# Patient Record
Sex: MALE | Race: WHITE | NOT HISPANIC OR LATINO | Employment: OTHER | URBAN - METROPOLITAN AREA
[De-identification: names, ages, dates, MRNs, and addresses within clinical notes are randomized per-mention and may not be internally consistent; named-entity substitution may affect disease eponyms.]

---

## 2017-09-29 ENCOUNTER — ALLSCRIPTS OFFICE VISIT (OUTPATIENT)
Dept: OTHER | Facility: OTHER | Age: 72
End: 2017-09-29

## 2018-01-12 VITALS
DIASTOLIC BLOOD PRESSURE: 72 MMHG | SYSTOLIC BLOOD PRESSURE: 112 MMHG | HEIGHT: 70 IN | WEIGHT: 149 LBS | BODY MASS INDEX: 21.33 KG/M2 | HEART RATE: 66 BPM

## 2018-02-12 ENCOUNTER — TRANSCRIBE ORDERS (OUTPATIENT)
Dept: ADMINISTRATIVE | Facility: HOSPITAL | Age: 73
End: 2018-02-12

## 2018-02-12 DIAGNOSIS — R10.9 STOMACH PAIN: Primary | ICD-10-CM

## 2018-02-15 ENCOUNTER — HOSPITAL ENCOUNTER (OUTPATIENT)
Dept: ULTRASOUND IMAGING | Facility: HOSPITAL | Age: 73
Discharge: HOME | End: 2018-02-15
Attending: INTERNAL MEDICINE
Payer: MEDICARE

## 2018-02-15 DIAGNOSIS — R10.9 STOMACH PAIN: ICD-10-CM

## 2018-02-15 PROCEDURE — 76700 US EXAM ABDOM COMPLETE: CPT

## 2018-03-15 ENCOUNTER — TRANSCRIBE ORDERS (OUTPATIENT)
Dept: ADMINISTRATIVE | Facility: HOSPITAL | Age: 73
End: 2018-03-15

## 2018-03-15 DIAGNOSIS — R10.13 ABDOMINAL PAIN, EPIGASTRIC: Primary | ICD-10-CM

## 2018-03-26 ENCOUNTER — HOSPITAL ENCOUNTER (OUTPATIENT)
Dept: NON INVASIVE DIAGNOSTICS | Facility: CLINIC | Age: 73
Discharge: HOME | End: 2018-03-26
Payer: MEDICARE

## 2018-03-26 DIAGNOSIS — R10.13 ABDOMINAL PAIN, EPIGASTRIC: ICD-10-CM

## 2018-03-26 PROCEDURE — 78227 HEPATOBIL SYST IMAGE W/DRUG: CPT

## 2018-03-26 PROCEDURE — A9537 TC99M MEBROFENIN: HCPCS

## 2018-03-26 RX ADMIN — SINCALIDE 1.3 MCG: 5 INJECTION, POWDER, LYOPHILIZED, FOR SOLUTION INTRAVENOUS at 10:07

## 2018-04-22 ENCOUNTER — HOSPITAL ENCOUNTER (OUTPATIENT)
Dept: CT IMAGING | Facility: HOSPITAL | Age: 73
Discharge: HOME | End: 2018-04-22
Attending: INTERNAL MEDICINE
Payer: MEDICARE

## 2018-04-22 DIAGNOSIS — R10.13 ABDOMINAL PAIN, EPIGASTRIC: ICD-10-CM

## 2018-04-22 PROCEDURE — 74177 CT ABD & PELVIS W/CONTRAST: CPT

## 2018-04-22 RX ADMIN — IOHEXOL 100 ML: 350 INJECTION, SOLUTION INTRAVENOUS at 11:15

## 2018-04-27 ENCOUNTER — TRANSCRIBE ORDERS (OUTPATIENT)
Dept: ADMINISTRATIVE | Facility: HOSPITAL | Age: 73
End: 2018-04-27

## 2018-04-27 DIAGNOSIS — R93.429 ABNORMAL CT SCAN, KIDNEY: Primary | ICD-10-CM

## 2018-05-13 ENCOUNTER — HOSPITAL ENCOUNTER (OUTPATIENT)
Dept: MRI IMAGING | Facility: HOSPITAL | Age: 73
Discharge: HOME | End: 2018-05-13
Attending: INTERNAL MEDICINE
Payer: MEDICARE

## 2018-05-13 DIAGNOSIS — R93.429 ABNORMAL CT SCAN, KIDNEY: ICD-10-CM

## 2018-05-13 PROCEDURE — 74183 MRI ABD W/O CNTR FLWD CNTR: CPT

## 2018-05-13 PROCEDURE — A9585 GADOBUTROL INJECTION: HCPCS | Performed by: INTERNAL MEDICINE

## 2018-05-13 RX ADMIN — GADOBUTROL 6 ML: 604.72 INJECTION INTRAVENOUS at 11:02

## 2018-05-15 ENCOUNTER — TELEPHONE (OUTPATIENT)
Dept: UROLOGY | Facility: AMBULATORY SURGERY CENTER | Age: 73
End: 2018-05-15

## 2018-05-15 NOTE — TELEPHONE ENCOUNTER
Received phone call from patient stating that he had gotten MRI results and needs OV ASAP    OV scheduled at patient's convenience

## 2018-05-18 ENCOUNTER — OFFICE VISIT (OUTPATIENT)
Dept: UROLOGY | Facility: CLINIC | Age: 73
End: 2018-05-18
Payer: MEDICARE

## 2018-05-18 VITALS
HEIGHT: 69 IN | WEIGHT: 142.8 LBS | BODY MASS INDEX: 21.15 KG/M2 | DIASTOLIC BLOOD PRESSURE: 72 MMHG | HEART RATE: 78 BPM | SYSTOLIC BLOOD PRESSURE: 118 MMHG

## 2018-05-18 DIAGNOSIS — N28.89 RENAL MASS, LEFT: Primary | ICD-10-CM

## 2018-05-18 PROCEDURE — 99214 OFFICE O/P EST MOD 30 MIN: CPT | Performed by: UROLOGY

## 2018-05-18 RX ORDER — LEVOTHYROXINE SODIUM 0.07 MG/1
TABLET ORAL
COMMUNITY
Start: 2014-02-25 | End: 2020-12-18 | Stop reason: SDUPTHER

## 2018-05-18 NOTE — PROGRESS NOTES
5/18/2018    Neel Ramírez    [birth year]  [id number]        Assessment  Left lower pole renal mass suspicious for renal cell carcinoma      Discussion  I had a lengthy discussion with the patient and his wife in the office today concerning CT scan as well as MRI findings regarding his incidentally found 1 5 cm left lower pole renal mass  Based on CT and MR criteria, this mass does appear to be most suspicious for a small renal cell carcinoma  We discussed options including observation as the mass is less than 2 cm versus percutaneous cryotherapy with a percutaneous biopsy versus robot assisted laparoscopic left partial nephrectomy  I feel that based on the small size of the mass that observation is appropriate at this time  The patient and his wife are comfortable with this plan  Follow-up will be in 6 months with a basic metabolic panel along with a repeat CT scan of the abdomen and pelvis with and without IV contrast   If there is interval growth of significance of the mass will then discuss cryotherapy versus surgical resection  History of Present Illness  75 y o  male with a history of intermittent abdominal pain  This prompted a CT scan  The CT scan showed a 1 5 cm lower pole solid enhancing mass  This was an incidental finding  Patient then had an MRI for confirmation which also is most consistent with a renal cell carcinoma  Patient presents to the office today to discuss and review the results of the CT as well as the MRI  AUA Symptom Score      Review of Systems  Review of Systems   Constitutional: Negative  HENT: Negative  Eyes: Negative  Respiratory: Negative  Cardiovascular: Negative  Gastrointestinal: Negative  Endocrine: Negative  Genitourinary: Negative  Musculoskeletal: Negative  Skin: Negative  Allergic/Immunologic: Negative  Neurological: Negative  Hematological: Negative  Psychiatric/Behavioral: Negative            Past Medical History  Past Medical History:   Diagnosis Date    Erectile dysfunction of non-organic origin     Gastroesophageal reflux     Hyperlipidemia     Thyroid disease        Past Social History  Past Surgical History:   Procedure Laterality Date    CYSTOSCOPY         Past Family History  Family History   Problem Relation Age of Onset    Cancer Mother      bladder    Prostate cancer Father     Stroke Father        Past Social history  Social History     Social History    Marital status: /Civil Union     Spouse name: N/A    Number of children: N/A    Years of education: N/A     Occupational History    Not on file  Social History Main Topics    Smoking status: Former Smoker    Smokeless tobacco: Never Used      Comment: quit at age 23    Alcohol use Yes      Comment: rarely    Drug use: No    Sexual activity: Not on file     Other Topics Concern    Not on file     Social History Narrative    No narrative on file       Current Medications  Current Outpatient Prescriptions   Medication Sig Dispense Refill    levothyroxine 75 mcg tablet Take by mouth       No current facility-administered medications for this visit  Allergies  Allergies   Allergen Reactions    Asa-Apap-Salicyl-Caff        Past Medical History, Social History, Family History, medications and allergies were reviewed  Vitals  Vitals:    05/18/18 1025   BP: 118/72   BP Location: Left arm   Patient Position: Sitting   Cuff Size: Adult   Pulse: 78   Weight: 64 8 kg (142 lb 12 8 oz)   Height: 5' 9" (1 753 m)       Physical Exam  Physical Exam        Results  Lab Results   Component Value Date    PSA 0 5 09/23/2016     No results found for: GLUCOSE, CALCIUM, NA, K, CO2, CL, BUN, CREATININE  No results found for: WBC, HGB, HCT, MCV, PLT      Office Urine Dip  No results found for this or any previous visit (from the past 1 hour(s))  ]      Total visit time was 25 minutes of which over 50% was spent on counseling

## 2018-11-19 ENCOUNTER — HOSPITAL ENCOUNTER (OUTPATIENT)
Dept: CT IMAGING | Facility: HOSPITAL | Age: 73
Discharge: HOME | End: 2018-11-19
Attending: UROLOGY
Payer: MEDICARE

## 2018-11-19 DIAGNOSIS — N28.89 RENAL MASS, LEFT: ICD-10-CM

## 2018-11-19 PROCEDURE — 74178 CT ABD&PLV WO CNTR FLWD CNTR: CPT

## 2018-11-19 RX ADMIN — IOHEXOL 100 ML: 350 INJECTION, SOLUTION INTRAVENOUS at 10:35

## 2018-11-29 ENCOUNTER — OFFICE VISIT (OUTPATIENT)
Dept: UROLOGY | Facility: AMBULATORY SURGERY CENTER | Age: 73
End: 2018-11-29
Payer: MEDICARE

## 2018-11-29 VITALS
DIASTOLIC BLOOD PRESSURE: 60 MMHG | HEIGHT: 69 IN | WEIGHT: 140 LBS | SYSTOLIC BLOOD PRESSURE: 118 MMHG | BODY MASS INDEX: 20.73 KG/M2

## 2018-11-29 DIAGNOSIS — C64.2 MALIGNANT NEOPLASM OF LEFT KIDNEY (HCC): Primary | ICD-10-CM

## 2018-11-29 PROCEDURE — 99214 OFFICE O/P EST MOD 30 MIN: CPT | Performed by: UROLOGY

## 2018-11-29 RX ORDER — KETOCONAZOLE 20 MG/G
CREAM TOPICAL
Refills: 6 | COMMUNITY
Start: 2018-10-19 | End: 2020-01-15 | Stop reason: ALTCHOICE

## 2018-11-29 NOTE — PROGRESS NOTES
11/29/2018    Johanna Dasilva    [birth year]  [id number]        Assessment  Stable 1 6 cm left lower pole renal mass highly suspicious for renal cell carcinoma      Discussion  Provided the patient with reassurance that his small renal mass remains stable from 6 months ago  There has been no interval growth or change in size  We discussed all options including continued observation versus percutaneous cryoablation versus robot assisted laparoscopic partial left nephrectomy  The patient is comfortable continuing observation at this time and I feel that this is reasonable as the mass is stable and below 2 cm  We did discuss the risk of disease progression or even metastasis from and untreated renal cell carcinoma  I recommend obtaining a chest x-ray at this time and as well as an additional chest x-ray renal ultrasound in 6 months time  History of Present Illness  75 y o  male with a history of an incidentally found left lower pole renal mass suspicious for renal cell carcinoma diagnosed in early 2018  This was found incidentally on an abdominal ultrasound  At that time he then underwent both CT scan and MRI confirming the suspicion  He returns in follow-up today with a repeat CT scan with IV contrast   The 1 6 cm left lower pole renal mass remains unchanged from prior  He denies flank pain or hematuria  I reviewed the CT scan images with the patient and his wife in the office today  AUA Symptom Score  AUA SYMPTOM SCORE      Most Recent Value   AUA SYMPTOM SCORE   How often have you had a sensation of not emptying your bladder completely after you finished urinating? 2   How often have you had to urinate again less than two hours after you finished urinating? 3   How often have you found you stopped and started again several times when you urinate? 5   How often have you found it difficult to postpone urination?   1   How often have you had a weak urinary stream?  2   How often have you had to push or strain to begin urination? 0   How many times did you most typically get up to urinate from the time you went to bed at night until the time you got up in the morning? 2   Quality of Life: If you were to spend the rest of your life with your urinary condition just the way it is now, how would you feel about that?  4   AUA SYMPTOM SCORE  15          Review of Systems  Review of Systems   Constitutional: Negative  HENT: Negative  Eyes: Negative  Respiratory: Negative  Cardiovascular: Negative  Gastrointestinal: Negative  Endocrine: Negative  Genitourinary: Negative  Musculoskeletal: Negative  Skin: Negative  Allergic/Immunologic: Negative  Neurological: Negative  Hematological: Negative  Psychiatric/Behavioral: Negative  Past Medical History  Past Medical History:   Diagnosis Date    Erectile dysfunction of non-organic origin     Gastroesophageal reflux     Hyperlipidemia     Thyroid disease        Past Social History  Past Surgical History:   Procedure Laterality Date    CYSTOSCOPY         Past Family History  Family History   Problem Relation Age of Onset    Cancer Mother         bladder    Prostate cancer Father     Stroke Father        Past Social history  Social History     Social History    Marital status: /Civil Union     Spouse name: N/A    Number of children: N/A    Years of education: N/A     Occupational History    Not on file       Social History Main Topics    Smoking status: Former Smoker    Smokeless tobacco: Never Used      Comment: quit at age 23    Alcohol use Yes      Comment: rarely    Drug use: No    Sexual activity: Not on file     Other Topics Concern    Not on file     Social History Narrative    No narrative on file       Current Medications  Current Outpatient Prescriptions   Medication Sig Dispense Refill    ketoconazole (NIZORAL) 2 % cream BRYAN TO AFFECTED AREAS ON LEFT HAND AND FEET BID  6    levothyroxine 75 mcg tablet Take by mouth       No current facility-administered medications for this visit  Allergies  Allergies   Allergen Reactions    Asa-Apap-Salicyl-Caff        Past Medical History, Social History, Family History, medications and allergies were reviewed  Vitals  Vitals:    11/29/18 0951   BP: 118/60   BP Location: Left arm   Patient Position: Sitting   Cuff Size: Standard   Weight: 63 5 kg (140 lb)   Height: 5' 9" (1 753 m)       Physical Exam  Physical Exam  On examination he is in no acute distress  Gait normal   Affect normal      Results  Lab Results   Component Value Date    PSA 0 5 09/23/2016     No results found for: GLUCOSE, CALCIUM, NA, K, CO2, CL, BUN, CREATININE  No results found for: WBC, HGB, HCT, MCV, PLT      Office Urine Dip  No results found for this or any previous visit (from the past 1 hour(s))  ]      Total visit time was 25 minutes of which over 50% was spent on counseling

## 2018-12-03 ENCOUNTER — HOSPITAL ENCOUNTER (OUTPATIENT)
Dept: RADIOLOGY | Facility: HOSPITAL | Age: 73
Discharge: HOME | End: 2018-12-03
Attending: UROLOGY
Payer: MEDICARE

## 2018-12-03 DIAGNOSIS — C64.2 MALIGNANT NEOPLASM OF LEFT KIDNEY (HCC): ICD-10-CM

## 2018-12-03 PROCEDURE — 71046 X-RAY EXAM CHEST 2 VIEWS: CPT

## 2019-05-29 ENCOUNTER — HOSPITAL ENCOUNTER (OUTPATIENT)
Dept: RADIOLOGY | Facility: HOSPITAL | Age: 74
Discharge: HOME | End: 2019-05-29
Attending: UROLOGY
Payer: MEDICARE

## 2019-05-29 ENCOUNTER — HOSPITAL ENCOUNTER (OUTPATIENT)
Dept: ULTRASOUND IMAGING | Facility: HOSPITAL | Age: 74
Discharge: HOME | End: 2019-05-29
Attending: UROLOGY
Payer: MEDICARE

## 2019-05-29 DIAGNOSIS — C64.2 MALIGNANT NEOPLASM OF LEFT KIDNEY (HCC): ICD-10-CM

## 2019-05-29 PROCEDURE — 71046 X-RAY EXAM CHEST 2 VIEWS: CPT

## 2019-05-29 PROCEDURE — 76770 US EXAM ABDO BACK WALL COMP: CPT

## 2019-06-12 ENCOUNTER — OFFICE VISIT (OUTPATIENT)
Dept: UROLOGY | Facility: AMBULATORY SURGERY CENTER | Age: 74
End: 2019-06-12
Payer: MEDICARE

## 2019-06-12 VITALS
SYSTOLIC BLOOD PRESSURE: 124 MMHG | WEIGHT: 151 LBS | HEIGHT: 69 IN | BODY MASS INDEX: 22.36 KG/M2 | DIASTOLIC BLOOD PRESSURE: 70 MMHG | HEART RATE: 60 BPM

## 2019-06-12 DIAGNOSIS — Z12.5 SCREENING FOR PROSTATE CANCER: ICD-10-CM

## 2019-06-12 DIAGNOSIS — N28.89 RENAL MASS, LEFT: Primary | ICD-10-CM

## 2019-06-12 PROCEDURE — 99213 OFFICE O/P EST LOW 20 MIN: CPT | Performed by: UROLOGY

## 2019-12-02 ENCOUNTER — TRANSCRIBE ORDERS (OUTPATIENT)
Dept: LAB | Facility: CLINIC | Age: 74
End: 2019-12-02

## 2019-12-02 ENCOUNTER — HOSPITAL ENCOUNTER (OUTPATIENT)
Dept: ULTRASOUND IMAGING | Facility: HOSPITAL | Age: 74
Discharge: HOME | End: 2019-12-02
Attending: UROLOGY
Payer: MEDICARE

## 2019-12-02 ENCOUNTER — HOSPITAL ENCOUNTER (OUTPATIENT)
Dept: RADIOLOGY | Facility: HOSPITAL | Age: 74
Discharge: HOME | End: 2019-12-02
Attending: UROLOGY
Payer: MEDICARE

## 2019-12-02 ENCOUNTER — APPOINTMENT (OUTPATIENT)
Dept: LAB | Facility: CLINIC | Age: 74
End: 2019-12-02
Payer: MEDICARE

## 2019-12-02 DIAGNOSIS — Z12.5 SCREENING FOR PROSTATE CANCER: ICD-10-CM

## 2019-12-02 DIAGNOSIS — N28.89 RENAL MASS, LEFT: ICD-10-CM

## 2019-12-02 LAB — PSA SERPL-MCNC: 0.6 NG/ML (ref 0–4)

## 2019-12-02 PROCEDURE — 71046 X-RAY EXAM CHEST 2 VIEWS: CPT

## 2019-12-02 PROCEDURE — G0103 PSA SCREENING: HCPCS

## 2019-12-02 PROCEDURE — 76770 US EXAM ABDO BACK WALL COMP: CPT

## 2019-12-02 PROCEDURE — 36415 COLL VENOUS BLD VENIPUNCTURE: CPT

## 2019-12-04 ENCOUNTER — TELEPHONE (OUTPATIENT)
Dept: UROLOGY | Facility: MEDICAL CENTER | Age: 74
End: 2019-12-04

## 2019-12-04 NOTE — TELEPHONE ENCOUNTER
Patient of Dr Lela Ramos seen at the [place] office  US of kidney and bladder results are available to view  Thank you

## 2019-12-11 ENCOUNTER — OFFICE VISIT (OUTPATIENT)
Dept: UROLOGY | Facility: AMBULATORY SURGERY CENTER | Age: 74
End: 2019-12-11
Payer: MEDICARE

## 2019-12-11 VITALS
SYSTOLIC BLOOD PRESSURE: 126 MMHG | WEIGHT: 151 LBS | BODY MASS INDEX: 22.3 KG/M2 | DIASTOLIC BLOOD PRESSURE: 76 MMHG | HEART RATE: 68 BPM

## 2019-12-11 DIAGNOSIS — C64.2 MALIGNANT NEOPLASM OF LEFT KIDNEY (HCC): Primary | ICD-10-CM

## 2019-12-11 PROCEDURE — 99215 OFFICE O/P EST HI 40 MIN: CPT | Performed by: UROLOGY

## 2019-12-11 NOTE — PROGRESS NOTES
12/11/2019    Simba Rees    [birth year]  [id number]        Assessment  1 6 cm left lower pole renal mass highly suspicious for renal cell carcinoma      Discussion  We discussed that the left lower pole mass has slightly increased in size  The mass is suspicious for renal cell carcinoma by radiographic criteria on both CT scan and ultrasound  We again discussed and reviewed all options including observation versus percutaneous ablation/biopsy versus robot assisted laparoscopic partial nephrectomy  At this time the patient is favoring percutaneous renal biopsy with percutaneous cryoablation  I feel that this is very appropriate based on the size and location of the lesion  I feel the lesion is quite amenable to this procedure  Referral to interventional Radiology has been made  Patient wishes to schedule in early 2020  He will return in follow-up after cryoablation has been completed  History of Present Illness  75 y o  male with a history of a small incidental left lower pole renal mass which initially measured 1 2 cm  He returns in follow-up today  On ultrasound the lesion is now 1 6 cm  I had a lengthy discussion with the patient and his wife regarding the size and location of the lesion as well as the radiographic characteristics which are most suspicious for renal cell carcinoma  His recent PSA level is 0 6  He denies any lower urinary tract symptoms or hematuria      AUA Symptom Score  AUA SYMPTOM SCORE      Most Recent Value   AUA SYMPTOM SCORE   How often have you had a sensation of not emptying your bladder completely after you finished urinating? 5   How often have you had to urinate again less than two hours after you finished urinating? 5   How often have you found you stopped and started again several times when you urinate? 5   How often have you found it difficult to postpone urination?   4   How often have you had a weak urinary stream?  3   How often have you had to push or strain to begin urination? 0   How many times did you most typically get up to urinate from the time you went to bed at night until the time you got up in the morning? 2   Quality of Life: If you were to spend the rest of your life with your urinary condition just the way it is now, how would you feel about that?  3   AUA SYMPTOM SCORE  24          Review of Systems  Review of Systems   Constitutional: Negative  HENT: Negative  Eyes: Negative  Respiratory: Negative  Cardiovascular: Negative  Gastrointestinal: Negative  Endocrine: Negative  Genitourinary: Negative  Musculoskeletal: Negative  Skin: Negative  Allergic/Immunologic: Negative  Neurological: Negative  Hematological: Negative  Psychiatric/Behavioral: Negative            Past Medical History  Past Medical History:   Diagnosis Date    Erectile dysfunction of non-organic origin     Gastroesophageal reflux     Hyperlipidemia     Thyroid disease        Past Social History  Past Surgical History:   Procedure Laterality Date    CYSTOSCOPY         Past Family History  Family History   Problem Relation Age of Onset    Cancer Mother         bladder    Prostate cancer Father     Stroke Father        Past Social history  Social History     Socioeconomic History    Marital status: /Civil Union     Spouse name: Not on file    Number of children: Not on file    Years of education: Not on file    Highest education level: Not on file   Occupational History    Not on file   Social Needs    Financial resource strain: Not on file    Food insecurity:     Worry: Not on file     Inability: Not on file    Transportation needs:     Medical: Not on file     Non-medical: Not on file   Tobacco Use    Smoking status: Former Smoker    Smokeless tobacco: Never Used    Tobacco comment: quit at age 23   Substance and Sexual Activity    Alcohol use: Yes     Comment: rarely    Drug use: No    Sexual activity: Not on file Lifestyle    Physical activity:     Days per week: Not on file     Minutes per session: Not on file    Stress: Not on file   Relationships    Social connections:     Talks on phone: Not on file     Gets together: Not on file     Attends Hindu service: Not on file     Active member of club or organization: Not on file     Attends meetings of clubs or organizations: Not on file     Relationship status: Not on file    Intimate partner violence:     Fear of current or ex partner: Not on file     Emotionally abused: Not on file     Physically abused: Not on file     Forced sexual activity: Not on file   Other Topics Concern    Not on file   Social History Narrative    Not on file       Current Medications  Current Outpatient Medications   Medication Sig Dispense Refill    levothyroxine 75 mcg tablet Take by mouth      ketoconazole (NIZORAL) 2 % cream BRYAN TO AFFECTED AREAS ON LEFT HAND AND FEET BID  6     No current facility-administered medications for this visit  Allergies  Allergies   Allergen Reactions    Asa-Apap-Salicyl-Caff        Past Medical History, Social History, Family History, medications and allergies were reviewed  Vitals  Vitals:    12/11/19 0958   BP: 126/76   Pulse: 68   Weight: 68 5 kg (151 lb)       Physical Exam  Physical Exam  On examination he is in no acute distress  His abdomen is soft nontender nondistended   examination reveals normal phallus, scrotum and scrotal contents  Testes are slightly small but otherwise normal in properly descended in the scrotum  Digital rectal examination reveals a 25-30 g prostate which is smooth and firm without nodularity  Skin is warm  Extremities without edema    Neurologic is grossly intact and nonfocal   Gait normal   Affect normal      Results  Lab Results   Component Value Date    PSA 0 6 12/02/2019    PSA 0 5 09/23/2016     No results found for: GLUCOSE, CALCIUM, NA, K, CO2, CL, BUN, CREATININE  No results found for: WBC, HGB, HCT, MCV, PLT      Office Urine Dip  No results found for this or any previous visit (from the past 1 hour(s)) ]

## 2019-12-19 ENCOUNTER — TELEPHONE (OUTPATIENT)
Dept: VASCULAR SURGERY | Facility: CLINIC | Age: 74
End: 2019-12-19

## 2019-12-19 NOTE — TELEPHONE ENCOUNTER
Called pt to schedule IR Clinic consult and had to leave a message on his answering machine asking for a return call

## 2020-01-02 ENCOUNTER — TRANSCRIBE ORDERS (OUTPATIENT)
Dept: VASCULAR SURGERY | Facility: CLINIC | Age: 75
End: 2020-01-02

## 2020-01-02 DIAGNOSIS — C64.2 MALIGNANT NEOPLASM OF LEFT KIDNEY, EXCEPT RENAL PELVIS (HCC): Primary | ICD-10-CM

## 2020-01-15 ENCOUNTER — CONSULT (OUTPATIENT)
Dept: VASCULAR SURGERY | Facility: CLINIC | Age: 75
End: 2020-01-15
Payer: MEDICARE

## 2020-01-15 VITALS
WEIGHT: 150 LBS | DIASTOLIC BLOOD PRESSURE: 64 MMHG | SYSTOLIC BLOOD PRESSURE: 126 MMHG | HEART RATE: 63 BPM | HEIGHT: 70 IN | BODY MASS INDEX: 21.47 KG/M2 | TEMPERATURE: 97.4 F

## 2020-01-15 DIAGNOSIS — C64.2 MALIGNANT NEOPLASM OF LEFT KIDNEY, EXCEPT RENAL PELVIS (HCC): ICD-10-CM

## 2020-01-15 PROCEDURE — 99203 OFFICE O/P NEW LOW 30 MIN: CPT | Performed by: RADIOLOGY

## 2020-01-15 NOTE — PROGRESS NOTES
Assessment/Plan: This 74M was found to have a suspicious left lower pole solid enhancing renal mass on imaging several years ago and presents to discuss treatment options  He is referred from urology Dr Marizol Turk  Briefly, he has a long history of intermittent anterior abdominal pain (no causes been found) and during this workup the left lower pole renal mass was found  It has been followed with imaging  History notable for iron overload (iatrogenic from pills for anemia), and above anterior abd pain  He is otherwise remarkably healthy taking only a single daily medication  He is currently asymptomatic from this mass  I reviewed the imaging with Mr Giorgi Robin and discussed the treatment options ranging from most aggressive which include total nephrectomy (not appropriate in this situation) and nephron sparing therapies such as partial nephrectomy and percutaneous ablation  Continued observation is also a choice but given his long expected life span I would recommend treatment especially as the mass has grown on recent ultrasound  Briefly, on imaging review there is a lower pole mass which is slightly lateral and measures 16-18 mm on prior CT and 16mm on current ultrasound whereas it measured 12 mm on prior ultrasound  I discussed the technique for percutaneous cryoablation and how we will use cold to freeze the tumor  I discussed the risk of injury to the collecting system, the kidney itself, and adjacent organs including the colon  I discussed how we may use hydrodissection to push the colon away  I discussed risks of freezing adjacent nerves which may result in transient or permanent nerve damage or numbness  Nephrometry score is 6 which predicts a low risk of vascular/collecting system complication  I discussed that we are very suspicious that this is a small malignant tumor but cannot be sure until we obtain tissue    We routinely will biopsy this at the same time as ablation because the alternative is partial nephrectomy and likelihood of a benign lesion is low  I answered his questions and he would like to proceed  We will book him at his convenience in several months at the [address] [facility]       Diagnoses and all orders for this visit:    Malignant neoplasm of left kidney, except renal pelvis [facility])  -     Ambulatory referral to Vascular Surgery          Subjective:      Patient ID: Romario Arambula Sr  is a 75 y o  male  Patient presents in office for consult of kidney Bx and cryoablation  HPI    The following portions of the patient's history were reviewed and updated as appropriate: allergies, current medications, past family history, past medical history, past social history, past surgical history and problem list     Review of Systems   Constitutional: Negative  HENT: Positive for hearing loss and tinnitus  Eyes: Negative  Respiratory: Negative  Cardiovascular: Negative  Gastrointestinal: Positive for abdominal pain (after eating)  Intermittent ant abd pain     Endocrine: Negative  Genitourinary: Negative  Musculoskeletal: Negative  Aches and pains that vary     Skin: Negative  Allergic/Immunologic: Negative  Neurological: Negative  Hematological: Negative  Psychiatric/Behavioral: Negative  Objective:      /64 (BP Location: Right arm, Patient Position: Sitting, Cuff Size: Adult)   Pulse 63   Temp (!) 97 4 °F (36 3 °C) (Tympanic)   Ht 5' 9 5" (1 765 m)   Wt 68 kg (150 lb)   BMI 21 83 kg/m²          Physical Exam   Constitutional: He is oriented to person, place, and time  He appears well-developed and well-nourished  Normal thin male - appears in good health   HENT:   Head: Normocephalic and atraumatic  Eyes: Pupils are equal, round, and reactive to light  EOM are normal    Neck: Normal range of motion  Neck supple  Cardiovascular: Normal rate and regular rhythm     Pulmonary/Chest: Effort normal and breath sounds normal    Abdominal: Soft  Bowel sounds are normal    Musculoskeletal: Normal range of motion  Neurological: He is alert and oriented to person, place, and time  Skin: Skin is warm and dry  Psychiatric: He has a normal mood and affect   His behavior is normal  Thought content normal

## 2020-01-20 ENCOUNTER — TELEPHONE (OUTPATIENT)
Dept: UROLOGY | Facility: MEDICAL CENTER | Age: 75
End: 2020-01-20

## 2020-01-20 NOTE — TELEPHONE ENCOUNTER
Patient of Dr Cheryle Rummage seen in [place] office  Patient has surgery date for April 6th for a IR biopsy and was told to call and get a follow up appointment with Dr Cheryle Rummage    Please assist

## 2020-01-20 NOTE — TELEPHONE ENCOUNTER
Spoke with patient's wife, Norman Landon  Patient scheduled for 4/23/20 at 11:30 in the [facility] office  Offered sooner appointments, wife declined, requesting late morning appointment, declined late afternoon appointments

## 2020-03-26 ENCOUNTER — TELEPHONE (OUTPATIENT)
Dept: VASCULAR SURGERY | Facility: CLINIC | Age: 75
End: 2020-03-26

## 2020-03-26 NOTE — TELEPHONE ENCOUNTER
Patient is scheduled for left renal cryoablation in early April  This was deferred from the winter based on patient preferences  Given current COVID pandemic we are re-evaluating the timing for cases  This is cancer treatment and therefore it is not an elective procedure  However the bile G of these tumors is generally slow growing and waiting several months would add will add negligible risk in my opinion    Given that this would be performed intubated and prone and that we have a slow growing 16 mm mass, it is prudent to defer until the hospital situation stabilizes  I discussed this at length with the patient, he agrees  Also discussed with Dr Georgina Urbina of urology  We will plan for around early June and re eval as necessary

## 2020-04-13 ENCOUNTER — TELEPHONE (OUTPATIENT)
Dept: UROLOGY | Facility: AMBULATORY SURGERY CENTER | Age: 75
End: 2020-04-13

## 2020-04-17 ENCOUNTER — TELEMEDICINE (OUTPATIENT)
Dept: UROLOGY | Facility: AMBULATORY SURGERY CENTER | Age: 75
End: 2020-04-17
Payer: MEDICARE

## 2020-04-17 DIAGNOSIS — N28.89 RENAL MASS, LEFT: Primary | ICD-10-CM

## 2020-04-17 PROCEDURE — 99442 PR PHYS/QHP TELEPHONE EVALUATION 11-20 MIN: CPT | Performed by: UROLOGY

## 2020-05-05 ENCOUNTER — HOSPITAL ENCOUNTER (OUTPATIENT)
Dept: ULTRASOUND IMAGING | Facility: HOSPITAL | Age: 75
Discharge: HOME | End: 2020-05-05
Attending: UROLOGY
Payer: MEDICARE

## 2020-05-05 DIAGNOSIS — N28.89 RENAL MASS, LEFT: ICD-10-CM

## 2020-05-05 PROCEDURE — 76770 US EXAM ABDO BACK WALL COMP: CPT

## 2020-05-14 ENCOUNTER — TELEPHONE (OUTPATIENT)
Dept: VASCULAR SURGERY | Facility: CLINIC | Age: 75
End: 2020-05-14

## 2020-05-15 DIAGNOSIS — N28.89 NODULE OF KIDNEY: Primary | ICD-10-CM

## 2020-05-26 ENCOUNTER — PATIENT OUTREACH (OUTPATIENT)
Dept: UROLOGY | Facility: AMBULATORY SURGERY CENTER | Age: 75
End: 2020-05-26

## 2020-05-27 ENCOUNTER — TELEMEDICINE (OUTPATIENT)
Dept: VASCULAR SURGERY | Facility: CLINIC | Age: 75
End: 2020-05-27
Payer: MEDICARE

## 2020-05-27 DIAGNOSIS — C64.2 RENAL CELL ADENOCARCINOMA, LEFT (HCC): Primary | ICD-10-CM

## 2020-05-27 PROCEDURE — 99214 OFFICE O/P EST MOD 30 MIN: CPT | Performed by: RADIOLOGY

## 2020-06-03 DIAGNOSIS — N28.89 NODULE OF KIDNEY: ICD-10-CM

## 2020-06-03 PROCEDURE — U0003 INFECTIOUS AGENT DETECTION BY NUCLEIC ACID (DNA OR RNA); SEVERE ACUTE RESPIRATORY SYNDROME CORONAVIRUS 2 (SARS-COV-2) (CORONAVIRUS DISEASE [COVID-19]), AMPLIFIED PROBE TECHNIQUE, MAKING USE OF HIGH THROUGHPUT TECHNOLOGIES AS DESCRIBED BY CMS-2020-01-R: HCPCS

## 2020-06-05 LAB — SARS-COV-2 RNA SPEC QL NAA+PROBE: NOT DETECTED

## 2020-06-08 ENCOUNTER — ANESTHESIA EVENT (OUTPATIENT)
Dept: RADIOLOGY | Facility: HOSPITAL | Age: 75
End: 2020-06-08
Payer: MEDICARE

## 2020-06-09 ENCOUNTER — ANESTHESIA (OUTPATIENT)
Dept: RADIOLOGY | Facility: HOSPITAL | Age: 75
End: 2020-06-09
Payer: MEDICARE

## 2020-06-09 ENCOUNTER — HOSPITAL ENCOUNTER (OUTPATIENT)
Dept: RADIOLOGY | Facility: HOSPITAL | Age: 75
Discharge: HOME | End: 2020-06-09
Attending: RADIOLOGY | Admitting: RADIOLOGY
Payer: MEDICARE

## 2020-06-09 VITALS
DIASTOLIC BLOOD PRESSURE: 65 MMHG | SYSTOLIC BLOOD PRESSURE: 135 MMHG | BODY MASS INDEX: 22.22 KG/M2 | HEIGHT: 69 IN | WEIGHT: 150 LBS | OXYGEN SATURATION: 99 % | TEMPERATURE: 97 F | HEART RATE: 54 BPM | RESPIRATION RATE: 12 BRPM

## 2020-06-09 DIAGNOSIS — C64.2 RENAL CELL ADENOCARCINOMA, LEFT (HCC): ICD-10-CM

## 2020-06-09 LAB
ANION GAP SERPL CALCULATED.3IONS-SCNC: 3 MMOL/L (ref 4–13)
BUN SERPL-MCNC: 21 MG/DL (ref 5–25)
CALCIUM SERPL-MCNC: 9.2 MG/DL (ref 8.3–10.1)
CHLORIDE SERPL-SCNC: 105 MMOL/L (ref 100–108)
CO2 SERPL-SCNC: 30 MMOL/L (ref 21–32)
CREAT SERPL-MCNC: 1.01 MG/DL (ref 0.6–1.3)
ERYTHROCYTE [DISTWIDTH] IN BLOOD BY AUTOMATED COUNT: 13 % (ref 11.6–15.1)
GFR SERPL CREATININE-BSD FRML MDRD: 72 ML/MIN/1.73SQ M
GLUCOSE P FAST SERPL-MCNC: 91 MG/DL (ref 65–99)
GLUCOSE SERPL-MCNC: 91 MG/DL (ref 65–140)
HCT VFR BLD AUTO: 40.4 % (ref 36.5–49.3)
HGB BLD-MCNC: 13.5 G/DL (ref 12–17)
INR PPP: 0.87 (ref 0.84–1.19)
MCH RBC QN AUTO: 33.3 PG (ref 26.8–34.3)
MCHC RBC AUTO-ENTMCNC: 33.4 G/DL (ref 31.4–37.4)
MCV RBC AUTO: 100 FL (ref 82–98)
PLATELET # BLD AUTO: 183 THOUSANDS/UL (ref 149–390)
PMV BLD AUTO: 8.8 FL (ref 8.9–12.7)
POTASSIUM SERPL-SCNC: 3.7 MMOL/L (ref 3.5–5.3)
PROTHROMBIN TIME: 11.5 SECONDS (ref 11.6–14.5)
RBC # BLD AUTO: 4.05 MILLION/UL (ref 3.88–5.62)
SODIUM SERPL-SCNC: 138 MMOL/L (ref 136–145)
WBC # BLD AUTO: 3.87 THOUSAND/UL (ref 4.31–10.16)

## 2020-06-09 PROCEDURE — 88341 IMHCHEM/IMCYTCHM EA ADD ANTB: CPT | Performed by: PATHOLOGY

## 2020-06-09 PROCEDURE — 88305 TISSUE EXAM BY PATHOLOGIST: CPT | Performed by: PATHOLOGY

## 2020-06-09 PROCEDURE — 85610 PROTHROMBIN TIME: CPT | Performed by: RADIOLOGY

## 2020-06-09 PROCEDURE — C2618 PROBE/NEEDLE, CRYO: HCPCS

## 2020-06-09 PROCEDURE — 50200 RENAL BIOPSY PERQ: CPT

## 2020-06-09 PROCEDURE — 77013 CT GUIDE FOR TISSUE ABLATION: CPT | Performed by: RADIOLOGY

## 2020-06-09 PROCEDURE — 77013 CT GUIDE FOR TISSUE ABLATION: CPT

## 2020-06-09 PROCEDURE — 50593 PERC CRYO ABLATE RENAL TUM: CPT | Performed by: RADIOLOGY

## 2020-06-09 PROCEDURE — 85027 COMPLETE CBC AUTOMATED: CPT | Performed by: RADIOLOGY

## 2020-06-09 PROCEDURE — 88342 IMHCHEM/IMCYTCHM 1ST ANTB: CPT | Performed by: PATHOLOGY

## 2020-06-09 PROCEDURE — 50200 RENAL BIOPSY PERQ: CPT | Performed by: RADIOLOGY

## 2020-06-09 PROCEDURE — 80048 BASIC METABOLIC PNL TOTAL CA: CPT | Performed by: RADIOLOGY

## 2020-06-09 PROCEDURE — 50593 PERC CRYO ABLATE RENAL TUM: CPT

## 2020-06-09 RX ORDER — ONDANSETRON 2 MG/ML
4 INJECTION INTRAMUSCULAR; INTRAVENOUS ONCE AS NEEDED
Status: DISCONTINUED | OUTPATIENT
Start: 2020-06-09 | End: 2020-06-09 | Stop reason: HOSPADM

## 2020-06-09 RX ORDER — LIDOCAINE HYDROCHLORIDE 10 MG/ML
INJECTION, SOLUTION EPIDURAL; INFILTRATION; INTRACAUDAL; PERINEURAL AS NEEDED
Status: DISCONTINUED | OUTPATIENT
Start: 2020-06-09 | End: 2020-06-09 | Stop reason: SURG

## 2020-06-09 RX ORDER — ROCURONIUM BROMIDE 10 MG/ML
INJECTION, SOLUTION INTRAVENOUS AS NEEDED
Status: DISCONTINUED | OUTPATIENT
Start: 2020-06-09 | End: 2020-06-09 | Stop reason: SURG

## 2020-06-09 RX ORDER — NEOSTIGMINE METHYLSULFATE 1 MG/ML
INJECTION INTRAVENOUS AS NEEDED
Status: DISCONTINUED | OUTPATIENT
Start: 2020-06-09 | End: 2020-06-09 | Stop reason: SURG

## 2020-06-09 RX ORDER — PROPOFOL 10 MG/ML
INJECTION, EMULSION INTRAVENOUS AS NEEDED
Status: DISCONTINUED | OUTPATIENT
Start: 2020-06-09 | End: 2020-06-09 | Stop reason: SURG

## 2020-06-09 RX ORDER — DEXAMETHASONE SODIUM PHOSPHATE 10 MG/ML
INJECTION, SOLUTION INTRAMUSCULAR; INTRAVENOUS AS NEEDED
Status: DISCONTINUED | OUTPATIENT
Start: 2020-06-09 | End: 2020-06-09 | Stop reason: SURG

## 2020-06-09 RX ORDER — EPHEDRINE SULFATE 50 MG/ML
INJECTION INTRAVENOUS AS NEEDED
Status: DISCONTINUED | OUTPATIENT
Start: 2020-06-09 | End: 2020-06-09 | Stop reason: SURG

## 2020-06-09 RX ORDER — SODIUM CHLORIDE 9 MG/ML
INJECTION, SOLUTION INTRAVENOUS CONTINUOUS PRN
Status: DISCONTINUED | OUTPATIENT
Start: 2020-06-09 | End: 2020-06-09

## 2020-06-09 RX ORDER — FENTANYL CITRATE 50 UG/ML
INJECTION, SOLUTION INTRAMUSCULAR; INTRAVENOUS AS NEEDED
Status: DISCONTINUED | OUTPATIENT
Start: 2020-06-09 | End: 2020-06-09 | Stop reason: SURG

## 2020-06-09 RX ORDER — ONDANSETRON 2 MG/ML
INJECTION INTRAMUSCULAR; INTRAVENOUS AS NEEDED
Status: DISCONTINUED | OUTPATIENT
Start: 2020-06-09 | End: 2020-06-09 | Stop reason: SURG

## 2020-06-09 RX ORDER — SODIUM CHLORIDE 9 MG/ML
75 INJECTION, SOLUTION INTRAVENOUS CONTINUOUS
Status: DISCONTINUED | OUTPATIENT
Start: 2020-06-09 | End: 2020-06-09 | Stop reason: HOSPADM

## 2020-06-09 RX ORDER — SODIUM CHLORIDE 9 MG/ML
50 INJECTION, SOLUTION INTRAVENOUS CONTINUOUS
Status: DISCONTINUED | OUTPATIENT
Start: 2020-06-09 | End: 2020-06-09 | Stop reason: HOSPADM

## 2020-06-09 RX ORDER — FENTANYL CITRATE/PF 50 MCG/ML
25 SYRINGE (ML) INJECTION
Status: DISCONTINUED | OUTPATIENT
Start: 2020-06-09 | End: 2020-06-09 | Stop reason: HOSPADM

## 2020-06-09 RX ORDER — CEFAZOLIN SODIUM 1 G/50ML
SOLUTION INTRAVENOUS AS NEEDED
Status: DISCONTINUED | OUTPATIENT
Start: 2020-06-09 | End: 2020-06-09 | Stop reason: SURG

## 2020-06-09 RX ORDER — GLYCOPYRROLATE 0.2 MG/ML
INJECTION INTRAMUSCULAR; INTRAVENOUS AS NEEDED
Status: DISCONTINUED | OUTPATIENT
Start: 2020-06-09 | End: 2020-06-09 | Stop reason: SURG

## 2020-06-09 RX ORDER — SODIUM CHLORIDE, SODIUM LACTATE, POTASSIUM CHLORIDE, CALCIUM CHLORIDE 600; 310; 30; 20 MG/100ML; MG/100ML; MG/100ML; MG/100ML
20 INJECTION, SOLUTION INTRAVENOUS CONTINUOUS
Status: DISCONTINUED | OUTPATIENT
Start: 2020-06-09 | End: 2020-06-09 | Stop reason: HOSPADM

## 2020-06-09 RX ORDER — SODIUM CHLORIDE, SODIUM LACTATE, POTASSIUM CHLORIDE, CALCIUM CHLORIDE 600; 310; 30; 20 MG/100ML; MG/100ML; MG/100ML; MG/100ML
125 INJECTION, SOLUTION INTRAVENOUS CONTINUOUS
Status: DISCONTINUED | OUTPATIENT
Start: 2020-06-09 | End: 2020-06-09 | Stop reason: HOSPADM

## 2020-06-09 RX ADMIN — LIDOCAINE HYDROCHLORIDE 50 MG: 10 INJECTION, SOLUTION EPIDURAL; INFILTRATION; INTRACAUDAL; PERINEURAL at 09:32

## 2020-06-09 RX ADMIN — GLYCOPYRROLATE 0.4 MG: 0.2 INJECTION, SOLUTION INTRAMUSCULAR; INTRAVENOUS at 11:07

## 2020-06-09 RX ADMIN — NEOSTIGMINE METHYLSULFATE 3 MG: 1 INJECTION, SOLUTION INTRAVENOUS at 11:07

## 2020-06-09 RX ADMIN — PHENYLEPHRINE HYDROCHLORIDE 40 MCG/MIN: 10 INJECTION INTRAVENOUS at 10:20

## 2020-06-09 RX ADMIN — CEFAZOLIN SODIUM 1000 MG: 1 SOLUTION INTRAVENOUS at 09:38

## 2020-06-09 RX ADMIN — FENTANYL CITRATE 25 MCG: 50 INJECTION, SOLUTION INTRAMUSCULAR; INTRAVENOUS at 11:00

## 2020-06-09 RX ADMIN — EPHEDRINE SULFATE 5 MG: 50 INJECTION, SOLUTION INTRAVENOUS at 09:54

## 2020-06-09 RX ADMIN — FENTANYL CITRATE 25 MCG: 50 INJECTION, SOLUTION INTRAMUSCULAR; INTRAVENOUS at 10:04

## 2020-06-09 RX ADMIN — GLYCOPYRROLATE 0.2 MG: 0.2 INJECTION, SOLUTION INTRAMUSCULAR; INTRAVENOUS at 09:52

## 2020-06-09 RX ADMIN — PROPOFOL 150 MG: 10 INJECTION, EMULSION INTRAVENOUS at 09:32

## 2020-06-09 RX ADMIN — ROCURONIUM BROMIDE 40 MG: 10 INJECTION, SOLUTION INTRAVENOUS at 09:32

## 2020-06-09 RX ADMIN — PHENYLEPHRINE HYDROCHLORIDE 100 MCG: 10 INJECTION INTRAVENOUS at 10:12

## 2020-06-09 RX ADMIN — ONDANSETRON 4 MG: 2 INJECTION INTRAMUSCULAR; INTRAVENOUS at 10:36

## 2020-06-09 RX ADMIN — EPHEDRINE SULFATE 10 MG: 50 INJECTION, SOLUTION INTRAVENOUS at 09:52

## 2020-06-09 RX ADMIN — SODIUM CHLORIDE 75 ML/HR: 0.9 INJECTION, SOLUTION INTRAVENOUS at 08:29

## 2020-06-09 RX ADMIN — PHENYLEPHRINE HYDROCHLORIDE 200 MCG: 10 INJECTION INTRAVENOUS at 09:52

## 2020-06-09 RX ADMIN — FENTANYL CITRATE 25 MCG: 50 INJECTION, SOLUTION INTRAMUSCULAR; INTRAVENOUS at 09:32

## 2020-06-09 RX ADMIN — DEXAMETHASONE SODIUM PHOSPHATE 10 MG: 10 INJECTION, SOLUTION INTRAMUSCULAR; INTRAVENOUS at 09:38

## 2020-06-11 ENCOUNTER — TELEPHONE (OUTPATIENT)
Dept: UROLOGY | Facility: AMBULATORY SURGERY CENTER | Age: 75
End: 2020-06-11

## 2020-06-25 ENCOUNTER — OFFICE VISIT (OUTPATIENT)
Dept: UROLOGY | Facility: AMBULATORY SURGERY CENTER | Age: 75
End: 2020-06-25
Payer: MEDICARE

## 2020-06-25 VITALS
SYSTOLIC BLOOD PRESSURE: 118 MMHG | WEIGHT: 153 LBS | HEIGHT: 69 IN | BODY MASS INDEX: 22.66 KG/M2 | DIASTOLIC BLOOD PRESSURE: 60 MMHG | HEART RATE: 72 BPM | TEMPERATURE: 97.5 F

## 2020-06-25 DIAGNOSIS — C64.2 MALIGNANT NEOPLASM OF LEFT KIDNEY (HCC): Primary | ICD-10-CM

## 2020-06-25 PROCEDURE — 99214 OFFICE O/P EST MOD 30 MIN: CPT | Performed by: UROLOGY

## 2020-09-21 RX ORDER — ASPIRIN 81 MG/1
81 TABLET, CHEWABLE ORAL DAILY
COMMUNITY
End: 2021-04-16

## 2020-09-21 RX ORDER — MULTIVIT WITH MINERALS/LUTEIN
1000 TABLET ORAL DAILY
COMMUNITY

## 2020-09-21 RX ORDER — OMEPRAZOLE 20 MG/1
20 CAPSULE, DELAYED RELEASE ORAL DAILY
COMMUNITY
End: 2021-03-22

## 2020-09-21 RX ORDER — CHOLECALCIFEROL (VITAMIN D3) 125 MCG
TABLET ORAL DAILY
COMMUNITY
End: 2021-04-16 | Stop reason: ALTCHOICE

## 2020-09-22 ENCOUNTER — OFFICE VISIT (OUTPATIENT)
Dept: INTERNAL MEDICINE CLINIC | Facility: CLINIC | Age: 75
End: 2020-09-22
Payer: MEDICARE

## 2020-09-22 VITALS
HEIGHT: 69 IN | WEIGHT: 150 LBS | BODY MASS INDEX: 22.22 KG/M2 | SYSTOLIC BLOOD PRESSURE: 124 MMHG | DIASTOLIC BLOOD PRESSURE: 65 MMHG | HEART RATE: 57 BPM | TEMPERATURE: 97.4 F

## 2020-09-22 DIAGNOSIS — K21.9 GASTROESOPHAGEAL REFLUX DISEASE WITHOUT ESOPHAGITIS: ICD-10-CM

## 2020-09-22 DIAGNOSIS — C64.2 RENAL CELL ADENOCARCINOMA, LEFT (HCC): ICD-10-CM

## 2020-09-22 DIAGNOSIS — E55.9 VITAMIN D DEFICIENCY, UNSPECIFIED: ICD-10-CM

## 2020-09-22 DIAGNOSIS — Z23 NEED FOR INFLUENZA VACCINATION: ICD-10-CM

## 2020-09-22 DIAGNOSIS — E03.9 ACQUIRED HYPOTHYROIDISM: Primary | ICD-10-CM

## 2020-09-22 DIAGNOSIS — E78.2 MIXED HYPERLIPIDEMIA: ICD-10-CM

## 2020-09-22 PROCEDURE — 90653 IIV ADJUVANT VACCINE IM: CPT

## 2020-09-22 PROCEDURE — G0008 ADMIN INFLUENZA VIRUS VAC: HCPCS

## 2020-09-22 PROCEDURE — 99214 OFFICE O/P EST MOD 30 MIN: CPT | Performed by: INTERNAL MEDICINE

## 2020-09-22 NOTE — PROGRESS NOTES
Assessment/Plan:             1  Acquired hypothyroidism  -     CBC and differential; Future  -     Comprehensive metabolic panel; Future  -     TSH, 3rd generation; Future  -     Lipid Panel with Direct LDL reflex; Future    2  Gastroesophageal reflux disease without esophagitis    3  Renal cell adenocarcinoma, left ([facility] [address] )    4  Vitamin D deficiency, unspecified    5  Mixed hyperlipidemia  -     Lipid Panel with Direct LDL reflex; Future    6  Need for influenza vaccination  -     FLUAD: influenza vaccine, trivalent, adjuvanted, 0 5 mL           Subjective:      Patient ID: Luther Dennis Sr  is a 67 y o  male  Multiple medical problems to ensure the stable on current medications      The following portions of the patient's history were reviewed and updated as appropriate: He  has a past medical history of Erectile dysfunction of non-organic origin, Gastroesophageal reflux, Hyperlipidemia, Hypothyroidism, Liver disease, Malignant neoplasm of left kidney, except [facility] ), Pure hypercholesterolemia, Thyroid disease, and Vitamin D deficiency, unspecified  He   Patient Active Problem List    Diagnosis Date Noted    Vitamin D deficiency, unspecified     Pure hypercholesterolemia     Malignant neoplasm of left kidney, except renal pelvis ([facility] )     Hyperlipidemia     Hypothyroidism     Gastroesophageal reflux     Renal cell adenocarcinoma, left ([facility] ) 05/27/2020     He  has a past surgical history that includes Cystoscopy; Hernia repair; and Urethra surgery (05/2010)  His family history includes Cancer in his mother; Prostate cancer in his father; Stroke in his father  He  reports that he has quit smoking  He has never used smokeless tobacco  He reports previous alcohol use  He reports that he does not use drugs    Current Outpatient Medications   Medication Sig Dispense Refill    Ascorbic Acid (vitamin C) 1000 MG tablet Take 1,000 mg by mouth daily      aspirin 81 mg chewable tablet Chew 81 mg daily      Ergocalciferol (Vitamin D2) 50 MCG (2000 UT) TABS Take by mouth daily      levothyroxine 75 mcg tablet Take by mouth      Multiple Vitamins-Minerals (MULTIVITAMIN ADULT PO) Take by mouth daily      omeprazole (PriLOSEC) 20 mg delayed release capsule Take 20 mg by mouth daily       No current facility-administered medications for this visit  Current Outpatient Medications on File Prior to Visit   Medication Sig    Ascorbic Acid (vitamin C) 1000 MG tablet Take 1,000 mg by mouth daily    aspirin 81 mg chewable tablet Chew 81 mg daily    Ergocalciferol (Vitamin D2) 50 MCG (2000 UT) TABS Take by mouth daily    levothyroxine 75 mcg tablet Take by mouth    Multiple Vitamins-Minerals (MULTIVITAMIN ADULT PO) Take by mouth daily    omeprazole (PriLOSEC) 20 mg delayed release capsule Take 20 mg by mouth daily     No current facility-administered medications on file prior to visit  He is allergic to asa-apap-salicyl-caff; atorvastatin; crestor [rosuvastatin]; and vytorin [ezetimibe-simvastatin]       Review of Systems   Constitutional: Negative for chills and fever  HENT: Negative for congestion, ear pain and sore throat  Eyes: Negative for pain  Respiratory: Negative for cough and shortness of breath  Cardiovascular: Negative for chest pain and leg swelling  Gastrointestinal: Negative for abdominal pain, nausea and vomiting  Endocrine: Negative for polyuria  Genitourinary: Negative for difficulty urinating, frequency and urgency  Musculoskeletal: Negative for arthralgias and back pain  Skin: Negative for rash  Neurological: Negative for weakness and headaches  Psychiatric/Behavioral: Negative for sleep disturbance  The patient is not nervous/anxious            Objective:      /65 (BP Location: Left arm, Patient Position: Sitting, Cuff Size: Adult)   Pulse 57   Temp (!) 97 4 °F (36 3 °C) (Temporal)   Ht 5' 9" (1 753 m)   Wt 68 kg (150 lb)   BMI 22 15 kg/m²     No results found for this or any previous visit (from the past 1344 hour(s))  Physical Exam  Constitutional:       Appearance: Normal appearance  HENT:      Head: Normocephalic  Right Ear: Tympanic membrane, ear canal and external ear normal       Left Ear: Tympanic membrane, ear canal and external ear normal       Nose: Nose normal  No congestion  Mouth/Throat:      Mouth: Mucous membranes are moist       Pharynx: Oropharynx is clear  No oropharyngeal exudate or posterior oropharyngeal erythema  Eyes:      Extraocular Movements: Extraocular movements intact  Conjunctiva/sclera: Conjunctivae normal       Pupils: Pupils are equal, round, and reactive to light  Neck:      Musculoskeletal: Normal range of motion and neck supple  Cardiovascular:      Rate and Rhythm: Normal rate and regular rhythm  Heart sounds: Normal heart sounds  No murmur  Pulmonary:      Effort: Pulmonary effort is normal       Breath sounds: Normal breath sounds  No wheezing or rales  Abdominal:      General: Bowel sounds are normal  There is no distension  Palpations: Abdomen is soft  Tenderness: There is no abdominal tenderness  Musculoskeletal: Normal range of motion  Right lower leg: No edema  Left lower leg: No edema  Lymphadenopathy:      Cervical: No cervical adenopathy  Skin:     General: Skin is warm  Neurological:      General: No focal deficit present  Mental Status: He is alert and oriented to person, place, and time

## 2020-09-25 ENCOUNTER — HOSPITAL ENCOUNTER (OUTPATIENT)
Dept: CT IMAGING | Facility: HOSPITAL | Age: 75
Discharge: HOME | End: 2020-09-25
Attending: UROLOGY
Payer: MEDICARE

## 2020-09-25 DIAGNOSIS — C64.2 MALIGNANT NEOPLASM OF LEFT KIDNEY (HCC): ICD-10-CM

## 2020-09-25 PROCEDURE — 74178 CT ABD&PLV WO CNTR FLWD CNTR: CPT

## 2020-09-25 RX ADMIN — IOHEXOL 100 ML: 350 INJECTION, SOLUTION INTRAVENOUS at 09:13

## 2020-10-14 ENCOUNTER — TELEPHONE (OUTPATIENT)
Dept: UROLOGY | Facility: AMBULATORY SURGERY CENTER | Age: 75
End: 2020-10-14

## 2020-10-14 ENCOUNTER — OFFICE VISIT (OUTPATIENT)
Dept: UROLOGY | Facility: AMBULATORY SURGERY CENTER | Age: 75
End: 2020-10-14
Payer: MEDICARE

## 2020-10-14 VITALS
DIASTOLIC BLOOD PRESSURE: 86 MMHG | WEIGHT: 150 LBS | HEART RATE: 76 BPM | TEMPERATURE: 97.3 F | HEIGHT: 69 IN | SYSTOLIC BLOOD PRESSURE: 122 MMHG | BODY MASS INDEX: 22.22 KG/M2

## 2020-10-14 DIAGNOSIS — C64.2 MALIGNANT NEOPLASM OF LEFT KIDNEY, EXCEPT RENAL PELVIS (HCC): Primary | ICD-10-CM

## 2020-10-14 PROCEDURE — 99213 OFFICE O/P EST LOW 20 MIN: CPT | Performed by: UROLOGY

## 2020-12-18 ENCOUNTER — TELEPHONE (OUTPATIENT)
Dept: UROLOGY | Facility: MEDICAL CENTER | Age: 75
End: 2020-12-18

## 2020-12-18 DIAGNOSIS — E03.9 ACQUIRED HYPOTHYROIDISM: Primary | ICD-10-CM

## 2020-12-18 RX ORDER — LEVOTHYROXINE SODIUM 0.07 MG/1
75 TABLET ORAL
Qty: 90 TABLET | Refills: 0 | Status: SHIPPED | OUTPATIENT
Start: 2020-12-18 | End: 2021-03-22 | Stop reason: SDUPTHER

## 2021-02-03 ENCOUNTER — OFFICE VISIT (OUTPATIENT)
Dept: UROLOGY | Facility: AMBULATORY SURGERY CENTER | Age: 76
End: 2021-02-03
Payer: MEDICARE

## 2021-02-03 VITALS
WEIGHT: 150 LBS | BODY MASS INDEX: 22.22 KG/M2 | DIASTOLIC BLOOD PRESSURE: 72 MMHG | HEART RATE: 62 BPM | SYSTOLIC BLOOD PRESSURE: 122 MMHG | HEIGHT: 69 IN

## 2021-02-03 DIAGNOSIS — C64.2 MALIGNANT NEOPLASM OF LEFT KIDNEY (HCC): Primary | ICD-10-CM

## 2021-02-03 PROCEDURE — 99213 OFFICE O/P EST LOW 20 MIN: CPT | Performed by: UROLOGY

## 2021-02-03 NOTE — PROGRESS NOTES
2/3/2021    Noemí Zavala    [birth year]  [id number]        Assessment    History of  A left papillary  Renal cell carcinoma status post percutaneous renal biopsy with cryoablation, left flank bulge  (Improving)    Discussion    I recommend follow-up in June 2021 with a basic metabolic panel, CT scan of the abdomen and pelvis with and without IV contrast as well as a chest x-ray  The patient is amenable with this plan  History of Present Illness  67 y o  male with a history of  A left renal mass concerning for renal cell carcinoma  In June of 2020 he underwent a percutaneous biopsy of the mass  At that time he also had cryoablation performed  Pathology returned with papillary renal cell carcinoma  At his last office visit a CT scan showed no evidence of enhancement or recurrence  He complained of a bulge in his left flank at that time  He states that the numbness in his left flank has improved and the bulge is decreasing in size  He states he has minimal bother from this at this time  AUA Symptom Score  AUA SYMPTOM SCORE      Most Recent Value   AUA SYMPTOM SCORE   How often have you had a sensation of not emptying your bladder completely after you finished urinating? 3   How often have you had to urinate again less than two hours after you finished urinating? 4   How often have you found you stopped and started again several times when you urinate? 5   How often have you found it difficult to postpone urination? 3   How often have you had a weak urinary stream?  2   How often have you had to push or strain to begin urination? 0   How many times did you most typically get up to urinate from the time you went to bed at night until the time you got up in the morning?   2   Quality of Life: If you were to spend the rest of your life with your urinary condition just the way it is now, how would you feel about that?  3   AUA SYMPTOM SCORE  19          Review of Systems  Review of Systems Constitutional: Negative  HENT: Negative  Eyes: Negative  Respiratory: Negative  Cardiovascular: Negative  Gastrointestinal: Negative  Endocrine: Negative  Genitourinary:        Per HPI   Musculoskeletal: Negative  Skin: Negative  Allergic/Immunologic: Negative  Neurological: Negative  Hematological: Negative  Psychiatric/Behavioral: Negative  All other systems reviewed and are negative          Past Medical History  Past Medical History:   Diagnosis Date    Erectile dysfunction of non-organic origin     Gastroesophageal reflux     Hyperlipidemia     Hypothyroidism     Liver disease     Malignant neoplasm of left kidney, except renal pelvis (HCC)     Pure hypercholesterolemia     Thyroid disease     Vitamin D deficiency, unspecified        Past Social History  Past Surgical History:   Procedure Laterality Date    CYSTOSCOPY      HERNIA REPAIR      URETHRA SURGERY  05/2010    Done by Dr Stephan Vogel        Past Family History  Family History   Problem Relation Age of Onset    Cancer Mother         bladder    Prostate cancer Father     Stroke Father        Past Social history  Social History     Socioeconomic History    Marital status: /Civil Union     Spouse name: Not on file    Number of children: Not on file    Years of education: Not on file    Highest education level: Not on file   Occupational History    Not on file   Social Needs    Financial resource strain: Not on file    Food insecurity     Worry: Not on file     Inability: Not on file   [employer] needs     Medical: Not on file     Non-medical: Not on file   Tobacco Use    Smoking status: Former Smoker    Smokeless tobacco: Never Used    Tobacco comment: quit at age 23   Substance and Sexual Activity    Alcohol use: Not Currently     Comment: rarely    Drug use: No    Sexual activity: Not on file   Lifestyle    Physical activity     Days per week: Not on file     Minutes per session: Not on file    Stress: Not on file   Relationships    Social connections     Talks on phone: Not on file     Gets together: Not on file     Attends Yazidi service: Not on file     Active member of club or organization: Not on file     Attends meetings of clubs or organizations: Not on file     Relationship status: Not on file    Intimate partner violence     Fear of current or ex partner: Not on file     Emotionally abused: Not on file     Physically abused: Not on file     Forced sexual activity: Not on file   Other Topics Concern    Not on file   Social History Narrative    Not on file       Current Medications  Current Outpatient Medications   Medication Sig Dispense Refill    Ascorbic Acid (vitamin C) 1000 MG tablet Take 1,000 mg by mouth daily      Ergocalciferol (Vitamin D2) 50 MCG (2000 UT) TABS Take by mouth daily      levothyroxine 75 mcg tablet Take 1 tablet (75 mcg total) by mouth daily in the early morning 90 tablet 0    Multiple Vitamins-Minerals (MULTIVITAMIN ADULT PO) Take by mouth daily      aspirin 81 mg chewable tablet Chew 81 mg daily      omeprazole (PriLOSEC) 20 mg delayed release capsule Take 20 mg by mouth daily       No current facility-administered medications for this visit  Allergies  Allergies   Allergen Reactions    Asa-Apap-Salicyl-Caff     Atorvastatin      Muscle pain    Crestor [Rosuvastatin] Dizziness    Vytorin [Ezetimibe-Simvastatin] Dizziness       Past Medical History, Social History, Family History, medications and allergies were reviewed  Vitals  Vitals:    02/03/21 1431   BP: 122/72   Pulse: 62   Weight: 68 kg (150 lb)   Height: 5' 9" (1 753 m)       Physical Exam  Physical Exam     on examination he is in no acute distress    The flank bulge his relatively minimal     Results  Lab Results   Component Value Date    PSA 0 6 12/02/2019    PSA 0 5 09/23/2016     Lab Results   Component Value Date    CALCIUM 9 2 06/09/2020    K 3 7 06/09/2020 CO2 30 06/09/2020     06/09/2020    BUN 21 06/09/2020    CREATININE 1 01 06/09/2020     Lab Results   Component Value Date    WBC 3 87 (L) 06/09/2020    HGB 13 5 06/09/2020    HCT 40 4 06/09/2020     (H) 06/09/2020     06/09/2020         Office Urine Dip  No results found for this or any previous visit (from the past 1 hour(s))  ]      Total visit time was 15 minutes of which over 50% was spent on counseling

## 2021-03-22 ENCOUNTER — OFFICE VISIT (OUTPATIENT)
Dept: INTERNAL MEDICINE CLINIC | Facility: CLINIC | Age: 76
End: 2021-03-22
Payer: MEDICARE

## 2021-03-22 VITALS
TEMPERATURE: 97.8 F | SYSTOLIC BLOOD PRESSURE: 122 MMHG | BODY MASS INDEX: 22.22 KG/M2 | HEART RATE: 65 BPM | WEIGHT: 150 LBS | HEIGHT: 69 IN | OXYGEN SATURATION: 97 % | DIASTOLIC BLOOD PRESSURE: 72 MMHG

## 2021-03-22 DIAGNOSIS — K21.9 GASTROESOPHAGEAL REFLUX DISEASE WITHOUT ESOPHAGITIS: ICD-10-CM

## 2021-03-22 DIAGNOSIS — E78.2 MIXED HYPERLIPIDEMIA: ICD-10-CM

## 2021-03-22 DIAGNOSIS — E03.9 ACQUIRED HYPOTHYROIDISM: ICD-10-CM

## 2021-03-22 DIAGNOSIS — C64.2 MALIGNANT NEOPLASM OF LEFT KIDNEY, EXCEPT RENAL PELVIS (HCC): ICD-10-CM

## 2021-03-22 DIAGNOSIS — I48.20 CHRONIC ATRIAL FIBRILLATION (HCC): Primary | ICD-10-CM

## 2021-03-22 DIAGNOSIS — R06.02 SHORTNESS OF BREATH: ICD-10-CM

## 2021-03-22 DIAGNOSIS — Z00.00 MEDICARE ANNUAL WELLNESS VISIT, SUBSEQUENT: ICD-10-CM

## 2021-03-22 PROCEDURE — G0438 PPPS, INITIAL VISIT: HCPCS | Performed by: INTERNAL MEDICINE

## 2021-03-22 PROCEDURE — 1123F ACP DISCUSS/DSCN MKR DOCD: CPT | Performed by: INTERNAL MEDICINE

## 2021-03-22 PROCEDURE — 93000 ELECTROCARDIOGRAM COMPLETE: CPT | Performed by: INTERNAL MEDICINE

## 2021-03-22 PROCEDURE — 99214 OFFICE O/P EST MOD 30 MIN: CPT | Performed by: INTERNAL MEDICINE

## 2021-03-22 RX ORDER — LEVOTHYROXINE SODIUM 0.07 MG/1
75 TABLET ORAL
Qty: 90 TABLET | Refills: 1 | Status: SHIPPED | OUTPATIENT
Start: 2021-03-22 | End: 2021-04-02 | Stop reason: SDUPTHER

## 2021-03-22 NOTE — PATIENT INSTRUCTIONS
Medicare Preventive Visit Patient Instructions  Thank you for completing your Welcome to Medicare Visit or Medicare Annual Wellness Visit today  Your next wellness visit will be due in one year (3/23/2022)  The screening/preventive services that you may require over the next 5-10 years are detailed below  Some tests may not apply to you based off risk factors and/or age  Screening tests ordered at today's visit but not completed yet may show as past due  Also, please note that scanned in results may not display below  Preventive Screenings:  Service Recommendations Previous Testing/Comments   Colorectal Cancer Screening  · Colonoscopy    · Fecal Occult Blood Test (FOBT)/Fecal Immunochemical Test (FIT)  · Fecal DNA/Cologuard Test  · Flexible Sigmoidoscopy Age: 54-65 years old   Colonoscopy: every 10 years (May be performed more frequently if at higher risk)  OR  FOBT/FIT: every 1 year  OR  Cologuard: every 3 years  OR  Sigmoidoscopy: every 5 years  Screening may be recommended earlier than age 48 if at higher risk for colorectal cancer  Also, an individualized decision between you and your healthcare provider will decide whether screening between the ages of 74-80 would be appropriate   Colonoscopy: Not on file  FOBT/FIT: Not on file  Cologuard: Not on file  Sigmoidoscopy: Not on file          Prostate Cancer Screening Individualized decision between patient and health care provider in men between ages of 53-78   Medicare will cover every 12 months beginning on the day after your 50th birthday PSA: 0 6 ng/mL     Screening Not Indicated     Hepatitis C Screening Once for adults born between [facility]  More frequently in patients at high risk for Hepatitis C Hep C Antibody: Not on file        Diabetes Screening 1-2 times per year if you're at risk for diabetes or have pre-diabetes Fasting glucose: 91 mg/dL   A1C: No results in last 5 years    Screening Current   Cholesterol Screening Once every 5 years if you don't have a lipid disorder  May order more often based on risk factors  Lipid panel: Not on file    Screening Not Indicated  History Lipid Disorder      Other Preventive Screenings Covered by Medicare:  1  Abdominal Aortic Aneurysm (AAA) Screening: covered once if your at risk  You're considered to be at risk if you have a family history of AAA or a male between the age of 73-68 who smoking at least 100 cigarettes in your lifetime  2  Lung Cancer Screening: covers low dose CT scan once per year if you meet all of the following conditions: (1) Age 50-69; (2) No signs or symptoms of lung cancer; (3) Current smoker or have quit smoking within the last 15 years; (4) You have a tobacco smoking history of at least 30 pack years (packs per day x number of years you smoked); (5) You get a written order from a healthcare provider  3  Glaucoma Screening: covered annually if you're considered high risk: (1) You have diabetes OR (2) Family history of glaucoma OR (3)  aged 48 and older OR (3)  American aged 72 and older  3  Osteoporosis Screening: covered every 2 years if you meet one of the following conditions: (1) Have a vertebral abnormality; (2) On glucocorticoid therapy for more than 3 months; (3) Have primary hyperparathyroidism; (4) On osteoporosis medications and need to assess response to drug therapy  5  HIV Screening: covered annually if you're between the age of 12-76  Also covered annually if you are younger than 13 and older than 72 with risk factors for HIV infection  For pregnant patients, it is covered up to 3 times per pregnancy      Immunizations:  Immunization Recommendations   Influenza Vaccine Annual influenza vaccination during flu season is recommended for all persons aged >= 6 months who do not have contraindications   Pneumococcal Vaccine (Prevnar and Pneumovax)  * Prevnar = PCV13  * Pneumovax = PPSV23 Adults 25-60 years old: 1-3 doses may be recommended based on certain risk factors  Adults 72 years old: Prevnar (PCV13) vaccine recommended followed by Pneumovax (PPSV23) vaccine  If already received PPSV23 since turning 65, then PCV13 recommended at least one year after PPSV23 dose  Hepatitis B Vaccine 3 dose series if at intermediate or high risk (ex: diabetes, end stage renal disease, liver disease)   Tetanus (Td) Vaccine - COST NOT COVERED BY MEDICARE PART B Following completion of primary series, a booster dose should be given every 10 years to maintain immunity against tetanus  Td may also be given as tetanus wound prophylaxis  Tdap Vaccine - COST NOT COVERED BY MEDICARE PART B Recommended at least once for all adults  For pregnant patients, recommended with each pregnancy  Shingles Vaccine (Shingrix) - COST NOT COVERED BY MEDICARE PART B  2 shot series recommended in those aged 48 and above     Health Maintenance Due:  There are no preventive care reminders to display for this patient  Immunizations Due:      Topic Date Due    COVID-19 Vaccine (1) Never done    DTaP,Tdap,and Td Vaccines (1 - Tdap) Never done    Pneumococcal Vaccine: 65+ Years (1 of 1 - PPSV23) Never done     Advance Directives   What are advance directives? Advance directives are legal documents that state your wishes and plans for medical care  These plans are made ahead of time in case you lose your ability to make decisions for yourself  Advance directives can apply to any medical decision, such as the treatments you want, and if you want to donate organs  What are the types of advance directives? There are many types of advance directives, and each state has rules about how to use them  You may choose a combination of any of the following:  · Living will: This is a written record of the treatment you want  You can also choose which treatments you do not want, which to limit, and which to stop at a certain time  This includes surgery, medicine, IV fluid, and tube feedings     · Durable power of  for [facility]): This is a written record that states who you want to make healthcare choices for you when you are unable to make them for yourself  This person, called a proxy, is usually a family member or a friend  You may choose more than 1 proxy  · Do not resuscitate (DNR) order:  A DNR order is used in case your heart stops beating or you stop breathing  It is a request not to have certain forms of treatment, such as CPR  A DNR order may be included in other types of advance directives  · Medical directive: This covers the care that you want if you are in a coma, near death, or unable to make decisions for yourself  You can list the treatments you want for each condition  Treatment may include pain medicine, surgery, blood transfusions, dialysis, IV or tube feedings, and a ventilator (breathing machine)  · Values history: This document has questions about your views, beliefs, and how you feel and think about life  This information can help others choose the care that you would choose  Why are advance directives important? An advance directive helps you control your care  Although spoken wishes may be used, it is better to have your wishes written down  Spoken wishes can be misunderstood, or not followed  Treatments may be given even if you do not want them  An advance directive may make it easier for your family to make difficult choices about your care  © Copyright [employer] 2018 Information is for End User's use only and may not be sold, redistributed or otherwise used for commercial purposes   All illustrations and images included in CareNotes® are the copyrighted property of [employer]  or [address] [place] [address] [employer]

## 2021-03-22 NOTE — PROGRESS NOTES
Assessment/Plan:             1  Chronic atrial fibrillation ([facility] [address] )  Comments:  EKG showed atrial fibrillation  Orders:  -     apixaban (Eliquis) 2 5 mg; Take 1 tablet (2 5 mg total) by mouth 2 (two) times a day  -     Ambulatory referral to Cardiology; Future  -     Echo complete with contrast if indicated; Future; Expected date: 03/22/2021    2  Shortness of breath  -     Echo complete with contrast if indicated; Future; Expected date: 03/22/2021  -     XR chest pa & lateral; Future; Expected date: 03/22/2021    3  Medicare annual wellness visit, subsequent    4  Acquired hypothyroidism  -     levothyroxine 75 mcg tablet; Take 1 tablet (75 mcg total) by mouth daily in the early morning    5  Gastroesophageal reflux disease without esophagitis    6  Malignant neoplasm of left kidney, except renal pelvis (HCC)    7  Mixed hyperlipidemia           Subjective:      Patient ID: Romario Arambula Sr  is a 67 y o  male  Blood test done on 03/11/2021-discussed with him, shortness of breath off and on, sometimes chest tightness      The following portions of the patient's history were reviewed and updated as appropriate: He  has a past medical history of Dyslipidemia, Erectile dysfunction of non-organic origin, Gastroesophageal reflux, Hyperlipidemia, Hypothyroidism, Liver disease, Malignant neoplasm of kidney ([facility] [address] ), Malignant neoplasm of left kidney, except renal pelvis ([facility] [address] ), Pure hypercholesterolemia, Thyroid disease, and Vitamin D deficiency, unspecified    He   Patient Active Problem List    Diagnosis Date Noted    Shortness of breath 03/22/2021    Medicare annual wellness visit, subsequent 03/22/2021    Chronic atrial fibrillation ([facility] [address] ) 03/22/2021    Vitamin D deficiency, unspecified     Pure hypercholesterolemia     Malignant neoplasm of left kidney, except renal pelvis (HCC)     Hyperlipidemia     Hypothyroidism     Gastroesophageal reflux     Renal cell adenocarcinoma, left ([facility] [address] ) 05/27/2020     He  has a past surgical history that includes Cystoscopy; Urethra surgery (05/2010); EGD AND COLONOSCOPY (12/15/2009); and Hernia repair (Left)  His family history includes Cancer in his mother; Heart disease in his father and mother; Hyperlipidemia in his family; Prostate cancer in his father; Stroke in his father  He  reports that he has quit smoking  He has never used smokeless tobacco  He reports previous alcohol use  He reports that he does not use drugs  Current Outpatient Medications   Medication Sig Dispense Refill    Ascorbic Acid (vitamin C) 1000 MG tablet Take 1,000 mg by mouth daily      Ergocalciferol (Vitamin D2) 50 MCG (2000 UT) TABS Take by mouth daily      levothyroxine 75 mcg tablet Take 1 tablet (75 mcg total) by mouth daily in the early morning 90 tablet 1    Multiple Vitamins-Minerals (MULTIVITAMIN ADULT PO) Take by mouth daily      apixaban (Eliquis) 2 5 mg Take 1 tablet (2 5 mg total) by mouth 2 (two) times a day 60 tablet 2    aspirin 81 mg chewable tablet Chew 81 mg daily       No current facility-administered medications for this visit  Current Outpatient Medications on File Prior to Visit   Medication Sig    Ascorbic Acid (vitamin C) 1000 MG tablet Take 1,000 mg by mouth daily    Ergocalciferol (Vitamin D2) 50 MCG (2000 UT) TABS Take by mouth daily    Multiple Vitamins-Minerals (MULTIVITAMIN ADULT PO) Take by mouth daily    [DISCONTINUED] levothyroxine 75 mcg tablet Take 1 tablet (75 mcg total) by mouth daily in the early morning    aspirin 81 mg chewable tablet Chew 81 mg daily    [DISCONTINUED] omeprazole (PriLOSEC) 20 mg delayed release capsule Take 20 mg by mouth daily     No current facility-administered medications on file prior to visit  He is allergic to asa-apap-salicyl-caff; atorvastatin; crestor [rosuvastatin]; and vytorin [ezetimibe-simvastatin]       Review of Systems   Constitutional: Negative for chills and fever     HENT: Negative for congestion, ear pain and sore throat  Eyes: Negative for pain  Respiratory: Positive for shortness of breath  Negative for cough  Cardiovascular: Positive for chest pain  Negative for leg swelling  Gastrointestinal: Negative for abdominal pain, nausea and vomiting  Endocrine: Negative for polyuria  Genitourinary: Negative for difficulty urinating, frequency and urgency  Musculoskeletal: Negative for arthralgias and back pain  Skin: Negative for rash  Neurological: Negative for weakness and headaches  Psychiatric/Behavioral: Negative for sleep disturbance  The patient is not nervous/anxious  Objective:      /72 (BP Location: Left arm, Patient Position: Sitting, Cuff Size: Standard)   Pulse 65   Temp 97 8 °F (36 6 °C) (Temporal)   Ht 5' 9" (1 753 m)   Wt 68 kg (150 lb)   SpO2 97%   BMI 22 15 kg/m²     No results found for this or any previous visit (from the past 1344 hour(s))  Physical Exam  Constitutional:       Appearance: Normal appearance  HENT:      Head: Normocephalic  Right Ear: Tympanic membrane, ear canal and external ear normal       Left Ear: Tympanic membrane, ear canal and external ear normal       Nose: Nose normal  No congestion  Mouth/Throat:      Mouth: Mucous membranes are moist       Pharynx: Oropharynx is clear  No oropharyngeal exudate or posterior oropharyngeal erythema  Eyes:      Extraocular Movements: Extraocular movements intact  Conjunctiva/sclera: Conjunctivae normal       Pupils: Pupils are equal, round, and reactive to light  Neck:      Musculoskeletal: Normal range of motion and neck supple  Cardiovascular:      Rate and Rhythm: Normal rate  Rhythm irregular  Heart sounds: Normal heart sounds  No murmur  Pulmonary:      Effort: Pulmonary effort is normal       Breath sounds: Normal breath sounds  No wheezing or rales  Abdominal:      General: Bowel sounds are normal  There is no distension  Palpations: Abdomen is soft  Tenderness: There is no abdominal tenderness  Musculoskeletal: Normal range of motion  Right lower leg: No edema  Left lower leg: No edema  Lymphadenopathy:      Cervical: No cervical adenopathy  Skin:     General: Skin is warm  Neurological:      General: No focal deficit present  Mental Status: He is alert and oriented to person, place, and time

## 2021-03-23 DIAGNOSIS — I48.20 CHRONIC ATRIAL FIBRILLATION (HCC): ICD-10-CM

## 2021-03-23 NOTE — TELEPHONE ENCOUNTER
eliquis does not come in a generic form, I called pharmacy and confirmed this with them as well and I spoke with pt as well, a 30 day supply is over 300$ and a 90 day supply is over 500$ through [facility], he does want the 90 day sent to [facility] but he says you want him to start this right away so we have 2 boxes of 5 mg sample sin the office can he have these and cut them in half until he would receive the order through the mail?

## 2021-03-24 ENCOUNTER — HOSPITAL ENCOUNTER (OUTPATIENT)
Dept: RADIOLOGY | Facility: HOSPITAL | Age: 76
Discharge: HOME | End: 2021-03-24
Attending: INTERNAL MEDICINE
Payer: MEDICARE

## 2021-03-24 ENCOUNTER — HOSPITAL ENCOUNTER (OUTPATIENT)
Dept: NON INVASIVE DIAGNOSTICS | Facility: CLINIC | Age: 76
Discharge: HOME | End: 2021-03-24
Payer: MEDICARE

## 2021-03-24 DIAGNOSIS — I48.20 CHRONIC ATRIAL FIBRILLATION (HCC): ICD-10-CM

## 2021-03-24 DIAGNOSIS — R06.02 SHORTNESS OF BREATH: ICD-10-CM

## 2021-03-24 PROCEDURE — 93306 TTE W/DOPPLER COMPLETE: CPT | Performed by: INTERNAL MEDICINE

## 2021-03-24 PROCEDURE — 71046 X-RAY EXAM CHEST 2 VIEWS: CPT

## 2021-03-24 PROCEDURE — 93306 TTE W/DOPPLER COMPLETE: CPT

## 2021-04-02 DIAGNOSIS — E03.9 ACQUIRED HYPOTHYROIDISM: ICD-10-CM

## 2021-04-02 RX ORDER — LEVOTHYROXINE SODIUM 0.07 MG/1
75 TABLET ORAL
Qty: 90 TABLET | Refills: 3 | Status: SHIPPED | OUTPATIENT
Start: 2021-04-02 | End: 2021-04-05 | Stop reason: SDUPTHER

## 2021-04-05 ENCOUNTER — OFFICE VISIT (OUTPATIENT)
Dept: INTERNAL MEDICINE CLINIC | Facility: CLINIC | Age: 76
End: 2021-04-05
Payer: MEDICARE

## 2021-04-05 VITALS
HEIGHT: 69 IN | DIASTOLIC BLOOD PRESSURE: 59 MMHG | SYSTOLIC BLOOD PRESSURE: 117 MMHG | HEART RATE: 64 BPM | BODY MASS INDEX: 22.22 KG/M2 | WEIGHT: 150 LBS | TEMPERATURE: 98.8 F

## 2021-04-05 DIAGNOSIS — E03.9 ACQUIRED HYPOTHYROIDISM: ICD-10-CM

## 2021-04-05 DIAGNOSIS — K21.9 GASTROESOPHAGEAL REFLUX DISEASE WITHOUT ESOPHAGITIS: ICD-10-CM

## 2021-04-05 DIAGNOSIS — E78.2 MIXED HYPERLIPIDEMIA: ICD-10-CM

## 2021-04-05 DIAGNOSIS — C64.2 MALIGNANT NEOPLASM OF LEFT KIDNEY, EXCEPT RENAL PELVIS (HCC): ICD-10-CM

## 2021-04-05 DIAGNOSIS — I48.20 CHRONIC ATRIAL FIBRILLATION (HCC): Primary | ICD-10-CM

## 2021-04-05 PROCEDURE — 99214 OFFICE O/P EST MOD 30 MIN: CPT | Performed by: INTERNAL MEDICINE

## 2021-04-05 RX ORDER — LEVOTHYROXINE SODIUM 0.07 MG/1
75 TABLET ORAL
Qty: 14 TABLET | Refills: 0 | Status: SHIPPED | OUTPATIENT
Start: 2021-04-05 | End: 2021-04-05

## 2021-04-05 RX ORDER — KETOCONAZOLE 20 MG/G
CREAM TOPICAL
COMMUNITY
Start: 2021-03-22

## 2021-04-05 RX ORDER — LEVOTHYROXINE SODIUM 0.07 MG/1
75 TABLET ORAL
Qty: 14 TABLET | Refills: 0 | Status: SHIPPED | OUTPATIENT
Start: 2021-04-05 | End: 2022-03-29 | Stop reason: SDUPTHER

## 2021-04-05 NOTE — PROGRESS NOTES
Assessment/Plan:             1  Chronic atrial fibrillation ([address] )    2  Acquired hypothyroidism  -     levothyroxine 75 mcg tablet; Take 1 tablet (75 mcg total) by mouth daily in the early morning for 14 days    3  Gastroesophageal reflux disease without esophagitis    4  Malignant neoplasm of left kidney, except renal pelvis ([address] )    5  Mixed hyperlipidemia           Subjective:      Patient ID: Siri Denton Sr  is a 67 y o  male  Follow-up on multiple medical problems to ensure they are stable on current medications      The following portions of the patient's history were reviewed and updated as appropriate: He  has a past medical history of Dyslipidemia, Erectile dysfunction of non-organic origin, Gastroesophageal reflux, Hyperlipidemia, Hypothyroidism, Liver disease, Malignant neoplasm of kidney ([address] ), Malignant neoplasm of left kidney, except renal pelvis ([address] ), Pure hypercholesterolemia, Thyroid disease, and Vitamin D deficiency, unspecified  He   Patient Active Problem List    Diagnosis Date Noted    Shortness of breath 03/22/2021    Medicare annual wellness visit, subsequent 03/22/2021    Chronic atrial fibrillation ([address] ) 03/22/2021    Vitamin D deficiency, unspecified     Pure hypercholesterolemia     Malignant neoplasm of left kidney, except renal pelvis (HCC)     Hyperlipidemia     Hypothyroidism     Gastroesophageal reflux     Renal cell adenocarcinoma, left ([address] ) 05/27/2020     He  has a past surgical history that includes Cystoscopy; Urethra surgery (05/2010); EGD AND COLONOSCOPY (12/15/2009); and Hernia repair (Left)  His family history includes Cancer in his mother; Heart disease in his father and mother; Hyperlipidemia in his family; Prostate cancer in his father; Stroke in his father  He  reports that he has quit smoking  He has never used smokeless tobacco  He reports previous alcohol use  He reports that he does not use drugs    Current Outpatient Medications   Medication Sig Dispense Refill    apixaban (Eliquis) 2 5 mg Take 1 tablet (2 5 mg total) by mouth 2 (two) times a day 180 tablet 0    Ascorbic Acid (vitamin C) 1000 MG tablet Take 1,000 mg by mouth daily      aspirin 81 mg chewable tablet Chew 81 mg daily      Ergocalciferol (Vitamin D2) 50 MCG (2000 UT) TABS Take by mouth daily      ketoconazole (NIZORAL) 2 % cream APPLY TO AFFECTED AREA(S) ON LEFT HAND AND FEET TWO TIMES DAILY      levothyroxine 75 mcg tablet Take 1 tablet (75 mcg total) by mouth daily in the early morning for 14 days 14 tablet 0    Multiple Vitamins-Minerals (MULTIVITAMIN ADULT PO) Take by mouth daily       No current facility-administered medications for this visit  Current Outpatient Medications on File Prior to Visit   Medication Sig    apixaban (Eliquis) 2 5 mg Take 1 tablet (2 5 mg total) by mouth 2 (two) times a day    Ascorbic Acid (vitamin C) 1000 MG tablet Take 1,000 mg by mouth daily    aspirin 81 mg chewable tablet Chew 81 mg daily    Ergocalciferol (Vitamin D2) 50 MCG (2000 UT) TABS Take by mouth daily    ketoconazole (NIZORAL) 2 % cream APPLY TO AFFECTED AREA(S) ON LEFT HAND AND FEET TWO TIMES DAILY    Multiple Vitamins-Minerals (MULTIVITAMIN ADULT PO) Take by mouth daily    [DISCONTINUED] levothyroxine 75 mcg tablet Take 1 tablet (75 mcg total) by mouth daily in the early morning     No current facility-administered medications on file prior to visit  He is allergic to asa-apap-salicyl-caff; atorvastatin; crestor [rosuvastatin]; and vytorin [ezetimibe-simvastatin]       Review of Systems   Constitutional: Negative for chills and fever  HENT: Negative for congestion, ear pain and sore throat  Eyes: Negative for pain  Respiratory: Positive for shortness of breath  Negative for cough  Cardiovascular: Negative for chest pain and leg swelling  Gastrointestinal: Negative for abdominal pain, nausea and vomiting  Endocrine: Negative for polyuria     Genitourinary: Negative for difficulty urinating, frequency and urgency  Musculoskeletal: Negative for arthralgias and back pain  Skin: Negative for rash  Neurological: Negative for weakness and headaches  Psychiatric/Behavioral: Negative for sleep disturbance  The patient is not nervous/anxious  Objective:      /59   Pulse 64   Temp 98 8 °F (37 1 °C)   Ht 5' 9" (1 753 m)   Wt 68 kg (150 lb)   BMI 22 15 kg/m²     No results found for this or any previous visit (from the past 1344 hour(s))  Physical Exam  Constitutional:       Appearance: Normal appearance  HENT:      Head: Normocephalic  Right Ear: External ear normal       Left Ear: External ear normal       Nose: Nose normal  No congestion  Mouth/Throat:      Mouth: Mucous membranes are moist       Pharynx: Oropharynx is clear  No oropharyngeal exudate or posterior oropharyngeal erythema  Eyes:      Extraocular Movements: Extraocular movements intact  Conjunctiva/sclera: Conjunctivae normal       Pupils: Pupils are equal, round, and reactive to light  Neck:      Musculoskeletal: Normal range of motion and neck supple  Cardiovascular:      Rate and Rhythm: Normal rate  Rhythm irregular  Heart sounds: Normal heart sounds  No murmur  Pulmonary:      Effort: Pulmonary effort is normal       Breath sounds: Normal breath sounds  No wheezing or rales  Abdominal:      General: Bowel sounds are normal  There is no distension  Palpations: Abdomen is soft  Tenderness: There is no abdominal tenderness  Musculoskeletal: Normal range of motion  Right lower leg: Edema present  Left lower leg: Edema present  Lymphadenopathy:      Cervical: No cervical adenopathy  Skin:     General: Skin is warm  Neurological:      General: No focal deficit present  Mental Status: He is alert and oriented to person, place, and time

## 2021-04-16 ENCOUNTER — CONSULT (OUTPATIENT)
Dept: CARDIOLOGY CLINIC | Facility: CLINIC | Age: 76
End: 2021-04-16
Payer: MEDICARE

## 2021-04-16 VITALS
HEART RATE: 62 BPM | BODY MASS INDEX: 22.36 KG/M2 | HEIGHT: 69 IN | SYSTOLIC BLOOD PRESSURE: 122 MMHG | DIASTOLIC BLOOD PRESSURE: 60 MMHG | WEIGHT: 151 LBS

## 2021-04-16 DIAGNOSIS — M25.471 ANKLE EDEMA, BILATERAL: ICD-10-CM

## 2021-04-16 DIAGNOSIS — M25.472 ANKLE EDEMA, BILATERAL: ICD-10-CM

## 2021-04-16 DIAGNOSIS — I48.20 CHRONIC ATRIAL FIBRILLATION (HCC): ICD-10-CM

## 2021-04-16 DIAGNOSIS — R06.02 SHORTNESS OF BREATH: ICD-10-CM

## 2021-04-16 DIAGNOSIS — R06.00 DYSPNEA ON EXERTION: ICD-10-CM

## 2021-04-16 DIAGNOSIS — R00.2 PALPITATIONS: Primary | ICD-10-CM

## 2021-04-16 PROCEDURE — 93000 ELECTROCARDIOGRAM COMPLETE: CPT | Performed by: INTERNAL MEDICINE

## 2021-04-16 PROCEDURE — 99205 OFFICE O/P NEW HI 60 MIN: CPT | Performed by: INTERNAL MEDICINE

## 2021-04-16 RX ORDER — LANOLIN ALCOHOL/MO/W.PET/CERES
CREAM (GRAM) TOPICAL DAILY
COMMUNITY

## 2021-04-16 RX ORDER — VITAMIN B COMPLEX
TABLET ORAL
COMMUNITY
End: 2021-12-01 | Stop reason: SDUPTHER

## 2021-04-16 RX ORDER — MELATONIN
1000 DAILY
COMMUNITY

## 2021-04-16 RX ORDER — POTASSIUM CHLORIDE 750 MG/1
20 CAPSULE, EXTENDED RELEASE ORAL DAILY
Qty: 180 CAPSULE | Refills: 3 | Status: SHIPPED | OUTPATIENT
Start: 2021-04-16

## 2021-04-16 RX ORDER — FUROSEMIDE 20 MG/1
20 TABLET ORAL 2 TIMES DAILY
Qty: 90 TABLET | Refills: 3 | Status: ON HOLD | OUTPATIENT
Start: 2021-04-16 | End: 2021-07-01 | Stop reason: SDUPTHER

## 2021-04-16 RX ORDER — LANOLIN ALCOHOL/MO/W.PET/CERES
CREAM (GRAM) TOPICAL
COMMUNITY

## 2021-04-16 NOTE — PROGRESS NOTES
HEART  [facility] [place] [facility]    Outpatient New Consult    Today's Date: 04/16/21        Patient name: Walter Ayala Sr  YOB: 1945  Sex: male         Chief Complaint: Referral for afib  ASSESSMENT:  Problem List Items Addressed This Visit        Cardiovascular and Mediastinum    Chronic atrial fibrillation (HCC)    Relevant Medications    apixaban (ELIQUIS) 5 mg    furosemide (LASIX) 20 mg tablet       Other    Shortness of breath    Relevant Medications    furosemide (LASIX) 20 mg tablet    Other Relevant Orders    POCT ECG      Other Visit Diagnoses     Palpitations    -  Primary    Relevant Medications    furosemide (LASIX) 20 mg tablet    Other Relevant Orders    POCT ECG    Ankle edema, bilateral        Relevant Medications    furosemide (LASIX) 20 mg tablet    potassium chloride (MICRO-K) 10 MEQ CR capsule        68 yo male  1) Persistent afib, likely started in Dec w symptoms of fatigue/shortness of breath on exertion  LA moderated dilated 4 6cm, [id number]=4 on eliquis 2 5mg bid started 2 weeks ago  Rate controlled 60s w/o medication  2) Acute diastolic chf, not well controlled, IVC mildly dilated on echo ( I reviewed images and interpreted), has ankle edema, exertional SOB  Maybe related to afib  3) Moderate MR on TTE, central, functional MR, likely related to afib  PLAN:  1) Increase Eliqus to 5mg bid  He has normal renal function  2) stop asa/krill oil  3) Recommend DCCV- risks and benefits discussed, will add VENKATESH since was underdosed on Eliquis  Risk of stroke discussed  Risk of recurrence very roberto  Will use DCCV just to see if feels better in afib, also risk of bradycaria so worry about starting antiarrhythmics at this point  Perhaps would be ablation candidate in long run and/or pacemaker     4) Start lasix 20mg daily and KCL 20meq for acute diastolic chf    5) Nuclear stress r/o ischemic heart disease so can see if can use class 1c antiarrhythmic safely and also work up exertional SOB  Inadquate sensitivity with regular ETT stress  Follow up in: 1month    Orders Placed This Encounter   Procedures    POCT ECG     Medications Discontinued During This Encounter   Medication Reason    Ergocalciferol (Vitamin D2) 50 MCG (2000 UT) TABS Alternate therapy    aspirin 81 mg chewable tablet     apixaban (Eliquis) 2 5 mg     KRILL OIL PO              HPI/Subjective:     66 yo male  1) Persistent afib, likely started in Dec w symptoms of fatigue/shortness of breath on exertion  LA moderated dilated 4 6cm, [id number]=2 on eliquis 2 5mg bid started 2 weeks ago  Rate controlled 60s w/o medication  2) Acute diastolic chf, not well controlled, IVC mildly dilated on echo ( I reviewed images and interpreted), has ankle edema, exertional SOB  Maybe related to afib  3) Moderate MR on TTE, central, functional MR, likely related to afib  Mr Chemo Gaines is well appearing 66 yo, he had h/o RCC but cardiac issues, Dec because short of breath w minimal exertion, worse laying flat, some ankle edema, fatigue  Went to PCP and found to be  In afib  Started on eliquis  He feels intermittently better but not the same as before  Occasionally has palpitations  Please note HPI is listed by problem with with update following it, it is copied again in the assessment above and reflects medical decision making as well  Complete 12 point ROS reviewed and otherwise non pertinent or negative except as per HPI pertinent positives in Cardiovascular and Respiratory emphasized  Please see paper chart for outpatient clinic patients where the patient completed the 12 point ROS survey             Past Medical History:   Diagnosis Date    Dyslipidemia     Erectile dysfunction of non-organic origin     Gastroesophageal reflux  Hyperlipidemia     Hypothyroidism     Liver disease     Malignant neoplasm of kidney (HCC)     Left    Malignant neoplasm of left kidney, except renal pelvis (HCC)     Pure hypercholesterolemia     Thyroid disease     Vitamin D deficiency, unspecified        Allergies   Allergen Reactions    Asa-Apap-Salicyl-Caff     Atorvastatin      Muscle pain    Crestor [Rosuvastatin] Dizziness    Vytorin [Ezetimibe-Simvastatin] Dizziness     I reviewed the Home Medication list and Allergies in the chart  Scheduled Meds:  Current Outpatient Medications   Medication Sig Dispense Refill    apixaban (ELIQUIS) 5 mg Take 1 tablet (5 mg total) by mouth 2 (two) times a day 180 tablet 3    Ascorbic Acid (vitamin C) 1000 MG tablet Take 1,000 mg by mouth daily      cholecalciferol (VITAMIN D3) 1,000 units tablet Take 1,000 Units by mouth daily      Coenzyme Q10 (CoQ10) 100 MG CAPS Take by mouth      ketoconazole (NIZORAL) 2 % cream APPLY TO AFFECTED AREA(S) ON LEFT HAND AND FEET TWO TIMES DAILY      levothyroxine 75 mcg tablet Take 1 tablet (75 mcg total) by mouth daily in the early morning for 14 days 14 tablet 0    magnesium oxide (MAG-OX) 400 mg Take 400 mg by mouth daily      melatonin 3 mg Take by mouth daily at bedtime      Multiple Vitamins-Minerals (MULTIVITAMIN ADULT PO) Take by mouth every other day       Probiotic Product (CULTURELLE PROBIOTICS PO) Take by mouth      vitamin B-12 (VITAMIN B-12) 1,000 mcg tablet Take by mouth daily      furosemide (LASIX) 20 mg tablet Take 1 tablet (20 mg total) by mouth 2 (two) times a day 90 tablet 3    potassium chloride (MICRO-K) 10 MEQ CR capsule Take 2 capsules (20 mEq total) by mouth daily 180 capsule 3     No current facility-administered medications for this visit        PRN Meds:         Family History   Problem Relation Age of Onset    Cancer Mother         bladder    Heart disease Mother     Prostate cancer Father     Stroke Father     Heart disease Father     Hyperlipidemia Family        Social History     Socioeconomic History    Marital status: /Civil Union     Spouse name: Not on file    Number of children: Not on file    Years of education: Not on file    Highest education level: Not on file   Occupational History    Not on file   Social Needs    Financial resource strain: Not on file    Food insecurity     Worry: Not on file     Inability: Not on file   [employer] needs     Medical: Not on file     Non-medical: Not on file   Tobacco Use    Smoking status: Former Smoker    Smokeless tobacco: Never Used    Tobacco comment: quit at age 23   Substance and Sexual Activity    Alcohol use: Not Currently     Comment: rarely    Drug use: No    Sexual activity: Not on file   Lifestyle    Physical activity     Days per week: Not on file     Minutes per session: Not on file    Stress: Not on file   Relationships    Social connections     Talks on phone: Not on file     Gets together: Not on file     Attends Scientologist service: Not on file     Active member of club or organization: Not on file     Attends meetings of clubs or organizations: Not on file     Relationship status: Not on file    Intimate partner violence     Fear of current or ex partner: Not on file     Emotionally abused: Not on file     Physically abused: Not on file     Forced sexual activity: Not on file   Other Topics Concern    Not on file   Social History Narrative    Not on file         OBJECTIVE:    /60   Pulse 62   Ht 5' 9" (1 753 m)   Wt 68 5 kg (151 lb)   BMI 22 30 kg/m²   Vitals:    04/16/21 1330   Weight: 68 5 kg (151 lb)     GEN: No acute distress, Alert and oriented, well appearing  HEENT:External ears normal, wearing a mask     EYES: Pupils equal, sclera anicteric, midline, normal conjuctiva  NECK: +JVD, supple, no obvious masses or thryomegaly or goiter  CARDIOVASCULAR: irreg irreg, No murmur, rub, gallops S1,S2  LUNGS: Clear To auscultation bilaterally, normal effort, no rales, rhonchi, crackles   ABDOMEN:  nondistended,  without obvious organomegaly or ascites  EXTREMITIES/VASCULAR: 1+ anastasiya edema  warm an well perfused  PSYCH: Normal Affect,  linear speech pattern without evidence of psychosis  NEURO: Grossly intact, moving all extremiteis equal, face symmetric, alert and responsive, no obvious focal defecits   GAIT:  Ambulates normally without difficulty  HEME: No bleeding, bruising, petechia, purpura   SKIN: No significant rashes on visibile skin, warm, no diaphoresis or pallor  Lab Results:       LABS:      Chemistry        Component Value Date/Time    K 3 7 2020 0828     2020 0828    CO2 30 2020 0828    BUN 21 2020 0828    CREATININE 1 01 2020 0828        Component Value Date/Time    CALCIUM 9 2 2020 0828            No results found for: CHOL  No results found for: HDL  No results found for: LDLCALC  No results found for: TRIG  No results found for: CHOLHDL    IMAGING: Xr Chest Pa & Lateral    Result Date: 3/26/2021  Narrative: CHEST INDICATION:   R06 02: Shortness of breath  COMPARISON:  Chest radiograph from 2019 and abdomen CT from 2020  EXAM PERFORMED/VIEWS:  XR CHEST PA & LATERAL  DUAL ENERGY SUBTRACTION  FINDINGS: Cardiomediastinal silhouette normal  Lungs clear  No effusion or pneumothorax  Mild benign biapical pleural parenchymal scar  Mild degenerative disease in the spine  Impression: No acute cardiopulmonary disease    Workstation performed: [id number]        Cardiac testing:   Results for orders placed during the hospital encounter of 21   Echo complete with contrast if indicated    Narrative [place] [id number], [address] [place] [address]  [phone]    Transthoracic Echocardiogram  2D, M-mode, Doppler, and Color Doppler    Study date:  24-Mar-2021    Patient: Kye Singh  MR number: [id number]  Account number: [de-identified]  : [id number]  Age: 67 years  Gender: Male  Status: Outpatient  Location: [facility]  Height: 69 in  Weight: 149 6 lb  BP: 122/ 72 mmHg    Indications: Atrial fib SOB    Diagnoses: I48 0 - Atrial fibrillation    Sonographer:  RACH Etienne  Referring Physician:  David Coker MD  Group:  [facility]  Interpreting Physician:  Valente Breaux DO    SUMMARY    PROCEDURE INFORMATION:  This was a technically difficult study  LEFT VENTRICLE:  Systolic function was normal  Ejection fraction was estimated to be 55 %  Although no diagnostic regional wall motion abnormality was identified, this possibility cannot be completely excluded on the basis of this study  RIGHT VENTRICLE:  The ventricle was mildly to moderately dilated  Systolic function was mildly reduced  Wall thickness was increased  LEFT ATRIUM:  The atrium was dilated  RIGHT ATRIUM:  The atrium was dilated  MITRAL VALVE:  There was moderate regurgitation  The regurgitant jet was centrally directed  AORTIC VALVE:  There was mild regurgitation  TRICUSPID VALVE:  There was moderate regurgitation  Estimated peak PA pressure was 41 mmHg  The findings suggest mild pulmonary hypertension  PULMONIC VALVE:  There was mild regurgitation  IVC, HEPATIC VEINS:  The inferior vena cava was mildly dilated  Respirophasic changes were blunted (less than 50% variation)  HISTORY: PRIOR HISTORY: HLD, hypothyroid    PROCEDURE: The study was performed in the [facility]  This was a routine study  The transthoracic approach was used  The study included complete 2D imaging, M-mode, complete spectral Doppler, and color Doppler  The  heart rate was 71 bpm, at the start of the study  Images were obtained from the parasternal, apical, subcostal, and suprasternal notch acoustic windows  Echocardiographic views were limited due to poor acoustic window availability   This  was a technically difficult study  LEFT VENTRICLE: Size was normal  Systolic function was normal  Ejection fraction was estimated to be 55 %  Although no diagnostic regional wall motion abnormality was identified, this possibility cannot be completely excluded on the basis  of this study  Wall thickness was normal  DOPPLER: Normal sinus rhythm was absent  The study was not technically sufficient to allow evaluation of LV diastolic function  RIGHT VENTRICLE: The ventricle was mildly to moderately dilated  Systolic function was mildly reduced  Wall thickness was increased  LEFT ATRIUM: The atrium was dilated  RIGHT ATRIUM: The atrium was dilated  MITRAL VALVE: Valve structure was normal  There was normal leaflet separation  DOPPLER: The transmitral velocity was within the normal range  There was no evidence for stenosis  There was moderate regurgitation  The regurgitant jet was  centrally directed  AORTIC VALVE: The valve was trileaflet  Leaflets exhibited normal cuspal separation and sclerosis  DOPPLER: Transaortic velocity was within the normal range  There was no evidence for stenosis  There was mild regurgitation  TRICUSPID VALVE: The valve structure was normal  There was normal leaflet separation  DOPPLER: The transtricuspid velocity was within the normal range  There was no evidence for stenosis  There was moderate regurgitation  Pulmonary artery  systolic pressure was mildly increased  Estimated peak PA pressure was 41 mmHg  The findings suggest mild pulmonary hypertension  PULMONIC VALVE: Leaflets exhibited normal thickness, no calcification, and normal cuspal separation  DOPPLER: The transpulmonic velocity was within the normal range  There was mild regurgitation  PERICARDIUM: There was no pericardial effusion  The pericardium was normal in appearance  AORTA: The root exhibited normal size  SYSTEMIC VEINS: IVC: The inferior vena cava was mildly dilated   Respirophasic changes were blunted (less than 50% variation)  SYSTEM MEASUREMENT TABLES    2D  %FS: 32 74 %  Ao Diam: 3 49 cm  Ao asc: 3 54 cm  EDV(Teich): 101 42 ml  EF(Teich): 61 17 %  ESV(Teich): 39 38 ml  IVSd: 0 71 cm  LA Diam: 4 63 cm  LVIDd: 4 68 cm  LVIDs: 3 15 cm  LVPWd: 0 73 cm  SV(Teich): 62 04 ml    CW  AV MaxP 84 mmHg  AV Vmax: 0 68 m/s  PV Vmax: 0 75 m/s  PV maxP 25 mmHg  TR MaxP 6 mmHg  TR Vmax: 2 53 m/s    MM  TAPSE: 1 56 cm    PW  E' Sept: 0 1 m/s  E/E' Sept: 6 96  LVOT Vmax: 0 48 m/s  LVOT maxP 93 mmHg  MV Dec [place]: 5 16 m/s2  MV DecT: 135 86 ms  MV E [place]: 0 69 m/s  MV PHT: 39 4 ms  MVA By PHT: 5 58 cm2  RVOT Vmax: 0 5 m/s  RVOT maxP mmHg    [facility]    Prepared and electronically signed by    Nanci Blankenship DO  Signed 24-Mar-2021 09:01:07       No results found for this or any previous visit  No results found for this or any previous visit  No results found for this or any previous visit  I reviewed and interpreted the following LABS/EKG/TELE/IMAGING and below is summary of my interpretation (if data available):    LABS:nromal renal function    Current EKG and Rhythm Strip: afib rate controlled 62bpm  Past EKGs and RHYTHM strip: afib  EKG    ECHO images reviewed and this is my interpretation: see above nomal EF, mild LAE, moderate functinal MR

## 2021-04-19 ENCOUNTER — TELEPHONE (OUTPATIENT)
Dept: CARDIOLOGY CLINIC | Facility: CLINIC | Age: 76
End: 2021-04-19

## 2021-04-19 DIAGNOSIS — I48.20 CHRONIC ATRIAL FIBRILLATION (HCC): Primary | ICD-10-CM

## 2021-04-19 NOTE — TELEPHONE ENCOUNTER
Patient scheduled for VENKATESH/CV on 4/30/21 in [facility] with Dr Pablo Zafar  Patient aware of all general instructions  Medication hold Lasix morning of  Patient has Medicare as primary insurance

## 2021-04-20 ENCOUNTER — APPOINTMENT (OUTPATIENT)
Dept: LAB | Facility: CLINIC | Age: 76
End: 2021-04-20
Payer: MEDICARE

## 2021-04-20 ENCOUNTER — TRANSCRIBE ORDERS (OUTPATIENT)
Dept: LAB | Facility: CLINIC | Age: 76
End: 2021-04-20

## 2021-04-20 DIAGNOSIS — I48.20 CHRONIC ATRIAL FIBRILLATION (HCC): ICD-10-CM

## 2021-04-20 LAB
ALBUMIN SERPL BCP-MCNC: 3.9 G/DL (ref 3.5–5)
ALP SERPL-CCNC: 63 U/L (ref 46–116)
ALT SERPL W P-5'-P-CCNC: 48 U/L (ref 12–78)
ANION GAP SERPL CALCULATED.3IONS-SCNC: 8 MMOL/L (ref 4–13)
AST SERPL W P-5'-P-CCNC: 22 U/L (ref 5–45)
BASOPHILS # BLD AUTO: 0.01 THOUSANDS/ΜL (ref 0–0.1)
BASOPHILS NFR BLD AUTO: 0 % (ref 0–1)
BILIRUB SERPL-MCNC: 0.59 MG/DL (ref 0.2–1)
BUN SERPL-MCNC: 26 MG/DL (ref 5–25)
CALCIUM SERPL-MCNC: 8.9 MG/DL (ref 8.3–10.1)
CHLORIDE SERPL-SCNC: 103 MMOL/L (ref 100–108)
CO2 SERPL-SCNC: 30 MMOL/L (ref 21–32)
CREAT SERPL-MCNC: 1.17 MG/DL (ref 0.6–1.3)
EOSINOPHIL # BLD AUTO: 0.09 THOUSAND/ΜL (ref 0–0.61)
EOSINOPHIL NFR BLD AUTO: 2 % (ref 0–6)
ERYTHROCYTE [DISTWIDTH] IN BLOOD BY AUTOMATED COUNT: 13.3 % (ref 11.6–15.1)
GFR SERPL CREATININE-BSD FRML MDRD: 60 ML/MIN/1.73SQ M
GLUCOSE P FAST SERPL-MCNC: 94 MG/DL (ref 65–99)
HCT VFR BLD AUTO: 37.8 % (ref 36.5–49.3)
HGB BLD-MCNC: 12.5 G/DL (ref 12–17)
IMM GRANULOCYTES # BLD AUTO: 0.01 THOUSAND/UL (ref 0–0.2)
IMM GRANULOCYTES NFR BLD AUTO: 0 % (ref 0–2)
LYMPHOCYTES # BLD AUTO: 1.51 THOUSANDS/ΜL (ref 0.6–4.47)
LYMPHOCYTES NFR BLD AUTO: 37 % (ref 14–44)
MCH RBC QN AUTO: 33.4 PG (ref 26.8–34.3)
MCHC RBC AUTO-ENTMCNC: 33.1 G/DL (ref 31.4–37.4)
MCV RBC AUTO: 101 FL (ref 82–98)
MONOCYTES # BLD AUTO: 0.53 THOUSAND/ΜL (ref 0.17–1.22)
MONOCYTES NFR BLD AUTO: 13 % (ref 4–12)
NEUTROPHILS # BLD AUTO: 1.96 THOUSANDS/ΜL (ref 1.85–7.62)
NEUTS SEG NFR BLD AUTO: 48 % (ref 43–75)
NRBC BLD AUTO-RTO: 0 /100 WBCS
PLATELET # BLD AUTO: 190 THOUSANDS/UL (ref 149–390)
PMV BLD AUTO: 9.1 FL (ref 8.9–12.7)
POTASSIUM SERPL-SCNC: 4.2 MMOL/L (ref 3.5–5.3)
PROT SERPL-MCNC: 7.3 G/DL (ref 6.4–8.2)
RBC # BLD AUTO: 3.74 MILLION/UL (ref 3.88–5.62)
SODIUM SERPL-SCNC: 141 MMOL/L (ref 136–145)
WBC # BLD AUTO: 4.11 THOUSAND/UL (ref 4.31–10.16)

## 2021-04-20 PROCEDURE — 85025 COMPLETE CBC W/AUTO DIFF WBC: CPT

## 2021-04-20 PROCEDURE — 36415 COLL VENOUS BLD VENIPUNCTURE: CPT

## 2021-04-20 PROCEDURE — 80053 COMPREHEN METABOLIC PANEL: CPT

## 2021-04-29 ENCOUNTER — TELEPHONE (OUTPATIENT)
Dept: NON INVASIVE DIAGNOSTICS | Facility: HOSPITAL | Age: 76
End: 2021-04-29

## 2021-04-29 RX ORDER — SODIUM CHLORIDE 9 MG/ML
50 INJECTION, SOLUTION INTRAVENOUS CONTINUOUS
Status: CANCELLED | OUTPATIENT
Start: 2021-04-29

## 2021-04-30 ENCOUNTER — ANESTHESIA EVENT (OUTPATIENT)
Dept: NON INVASIVE DIAGNOSTICS | Facility: HOSPITAL | Age: 76
End: 2021-04-30

## 2021-04-30 ENCOUNTER — ANESTHESIA (OUTPATIENT)
Dept: NON INVASIVE DIAGNOSTICS | Facility: HOSPITAL | Age: 76
End: 2021-04-30

## 2021-04-30 ENCOUNTER — HOSPITAL ENCOUNTER (OUTPATIENT)
Dept: NON INVASIVE DIAGNOSTICS | Facility: HOSPITAL | Age: 76
Discharge: HOME | End: 2021-04-30
Attending: INTERNAL MEDICINE
Payer: MEDICARE

## 2021-04-30 VITALS
RESPIRATION RATE: 18 BRPM | OXYGEN SATURATION: 100 % | DIASTOLIC BLOOD PRESSURE: 69 MMHG | TEMPERATURE: 96.7 F | WEIGHT: 146 LBS | HEART RATE: 57 BPM | BODY MASS INDEX: 21.56 KG/M2 | SYSTOLIC BLOOD PRESSURE: 166 MMHG

## 2021-04-30 DIAGNOSIS — I48.20 CHRONIC ATRIAL FIBRILLATION (HCC): ICD-10-CM

## 2021-04-30 PROCEDURE — 93325 DOPPLER ECHO COLOR FLOW MAPG: CPT | Performed by: INTERNAL MEDICINE

## 2021-04-30 PROCEDURE — 92960 CARDIOVERSION ELECTRIC EXT: CPT

## 2021-04-30 PROCEDURE — 92960 CARDIOVERSION ELECTRIC EXT: CPT | Performed by: INTERNAL MEDICINE

## 2021-04-30 PROCEDURE — 93320 DOPPLER ECHO COMPLETE: CPT | Performed by: INTERNAL MEDICINE

## 2021-04-30 PROCEDURE — 93005 ELECTROCARDIOGRAM TRACING: CPT

## 2021-04-30 PROCEDURE — 93312 ECHO TRANSESOPHAGEAL: CPT | Performed by: INTERNAL MEDICINE

## 2021-04-30 PROCEDURE — 93312 ECHO TRANSESOPHAGEAL: CPT

## 2021-04-30 RX ORDER — SODIUM CHLORIDE 9 MG/ML
50 INJECTION, SOLUTION INTRAVENOUS CONTINUOUS
Status: DISCONTINUED | OUTPATIENT
Start: 2021-04-30 | End: 2021-05-01 | Stop reason: HOSPADM

## 2021-04-30 RX ORDER — LIDOCAINE HYDROCHLORIDE 20 MG/ML
INJECTION, SOLUTION EPIDURAL; INFILTRATION; INTRACAUDAL; PERINEURAL AS NEEDED
Status: DISCONTINUED | OUTPATIENT
Start: 2021-04-30 | End: 2021-04-30

## 2021-04-30 RX ORDER — PROPOFOL 10 MG/ML
INJECTION, EMULSION INTRAVENOUS CONTINUOUS PRN
Status: DISCONTINUED | OUTPATIENT
Start: 2021-04-30 | End: 2021-04-30

## 2021-04-30 RX ORDER — PROPOFOL 10 MG/ML
INJECTION, EMULSION INTRAVENOUS AS NEEDED
Status: DISCONTINUED | OUTPATIENT
Start: 2021-04-30 | End: 2021-04-30

## 2021-04-30 RX ORDER — KETAMINE HYDROCHLORIDE 50 MG/ML
INJECTION, SOLUTION, CONCENTRATE INTRAMUSCULAR; INTRAVENOUS AS NEEDED
Status: DISCONTINUED | OUTPATIENT
Start: 2021-04-30 | End: 2021-04-30

## 2021-04-30 RX ORDER — GLYCOPYRROLATE 0.2 MG/ML
INJECTION INTRAMUSCULAR; INTRAVENOUS AS NEEDED
Status: DISCONTINUED | OUTPATIENT
Start: 2021-04-30 | End: 2021-04-30

## 2021-04-30 RX ADMIN — APIXABAN 5 MG: 5 TABLET, FILM COATED ORAL at 10:10

## 2021-04-30 RX ADMIN — KETAMINE HYDROCHLORIDE 10 MG: 50 INJECTION INTRAMUSCULAR; INTRAVENOUS at 10:31

## 2021-04-30 RX ADMIN — PROPOFOL 50 MG: 10 INJECTION, EMULSION INTRAVENOUS at 10:34

## 2021-04-30 RX ADMIN — LIDOCAINE HYDROCHLORIDE 50 MG: 20 INJECTION, SOLUTION EPIDURAL; INFILTRATION; INTRACAUDAL at 10:31

## 2021-04-30 RX ADMIN — SODIUM CHLORIDE 50 ML/HR: 0.9 INJECTION, SOLUTION INTRAVENOUS at 09:05

## 2021-04-30 RX ADMIN — KETAMINE HYDROCHLORIDE 5 MG: 50 INJECTION INTRAMUSCULAR; INTRAVENOUS at 10:38

## 2021-04-30 RX ADMIN — PROPOFOL 50 MG: 10 INJECTION, EMULSION INTRAVENOUS at 10:31

## 2021-04-30 RX ADMIN — GLYCOPYRROLATE 0.1 MG: 0.2 INJECTION, SOLUTION INTRAMUSCULAR; INTRAVENOUS at 10:20

## 2021-04-30 RX ADMIN — PROPOFOL 100 MCG/KG/MIN: 10 INJECTION, EMULSION INTRAVENOUS at 10:31

## 2021-04-30 RX ADMIN — KETAMINE HYDROCHLORIDE 5 MG: 50 INJECTION INTRAMUSCULAR; INTRAVENOUS at 10:35

## 2021-04-30 RX ADMIN — LIDOCAINE HYDROCHLORIDE 30 MG: 20 INJECTION, SOLUTION EPIDURAL; INFILTRATION; INTRACAUDAL at 10:34

## 2021-04-30 NOTE — ANESTHESIA PREPROCEDURE EVALUATION
Procedure:  VENKATESH    Relevant Problems   CARDIO   (+) Chronic atrial fibrillation (HCC)   (+) Hyperlipidemia   (+) Pure hypercholesterolemia      ENDO   (+) Hypothyroidism      GI/HEPATIC   (+) Gastroesophageal reflux      /RENAL   (+) Malignant neoplasm of left kidney, except renal pelvis (HCC)   (+) Renal cell adenocarcinoma, left (HCC)      PULMONARY   (+) Shortness of breath      Lab Results   Component Value Date    WBC 4 11 (L) 04/20/2021    HGB 12 5 04/20/2021    HCT 37 8 04/20/2021     (H) 04/20/2021     04/20/2021     Lab Results   Component Value Date    K 4 2 04/20/2021    CO2 30 04/20/2021     04/20/2021    BUN 26 (H) 04/20/2021    CREATININE 1 17 04/20/2021     Lab Results   Component Value Date    INR 0 87 06/09/2020    PROTIME 11 5 (L) 06/09/2020     Physical Exam    Airway    Mallampati score: II  TM Distance: >3 FB  Neck ROM: full     Dental   Comment: Permanent implants, poor dentition,     Cardiovascular  Rhythm: irregular, Rate: normal,     Pulmonary      Other Findings        Anesthesia Plan  ASA Score- 3     Anesthesia Type- IV sedation with anesthesia with ASA Monitors  Additional Monitors:   Airway Plan:     Comment: GETA as backup plan to natural airway  Plan Factors-Exercise tolerance (METS): >4 METS  Chart reviewed  Existing labs reviewed  Patient summary reviewed  Induction- intravenous  Postoperative Plan-     Informed Consent- Anesthetic plan and risks discussed with patient  I personally reviewed this patient with the CRNA  Discussed and agreed on the Anesthesia Plan with the CRNA  Edu Awan

## 2021-04-30 NOTE — PROGRESS NOTES
VENKATESH with normal LVEF, and no thrombus in the left atrial appendage  Cardioversion at 100 joules, with 2 beats in sinus rhythm then reverted to AFib  Cardioversion again at 150 joules, with 2 beats in sinus rhythm and then reverted to atrial fibrillation  Cardioversion attempted a 3rd time at 175 joules, again with 2 beats in sinus rhythm and quickly reverting to atrial fibrillation    Dr Andrew Garner was made aware of today's results  Patient to follow-up with him to discuss further intervention

## 2021-04-30 NOTE — ANESTHESIA POSTPROCEDURE EVALUATION
Post-Op Assessment Note    CV Status:  Stable  Pain Score: 0    Pain management: adequate     Mental Status:  Alert and awake   Hydration Status:  Euvolemic   PONV Controlled:  Controlled   Airway Patency:  Patent      Post Op Vitals Reviewed: Yes      Staff: CRNA         No complications documented      BP   106/63   Temp     Pulse 55   Resp   16   SpO2   100

## 2021-04-30 NOTE — DISCHARGE INSTRUCTIONS
No driving times 24 hours since he received sedation  Unsuccessful cardioversion today, Dr Samara Hodge made aware, his office will be in touch

## 2021-04-30 NOTE — ANESTHESIA POSTPROCEDURE EVALUATION
Post-Op Assessment Note    CV Status:  Stable       Two or more mitigation strategies used for obstructive sleep apnea    Staff: CRNA         No complications documented      /63 (04/30/21 1057)    Temp (!) 96 7 °F (35 9 °C) (04/30/21 1057)    Pulse (!) 49 (04/30/21 1057)   Resp 18 (04/30/21 1057)    SpO2 100 % (04/30/21 1057)

## 2021-05-02 LAB
ATRIAL RATE: 340 BPM
ATRIAL RATE: 375 BPM
QRS AXIS: 10 DEGREES
QRS AXIS: 16 DEGREES
QRSD INTERVAL: 82 MS
QRSD INTERVAL: 82 MS
QT INTERVAL: 412 MS
QT INTERVAL: 426 MS
QTC INTERVAL: 401 MS
QTC INTERVAL: 407 MS
T WAVE AXIS: 56 DEGREES
T WAVE AXIS: 60 DEGREES
VENTRICULAR RATE: 55 BPM
VENTRICULAR RATE: 57 BPM

## 2021-05-02 PROCEDURE — 93010 ELECTROCARDIOGRAM REPORT: CPT | Performed by: INTERNAL MEDICINE

## 2021-05-07 ENCOUNTER — TELEPHONE (OUTPATIENT)
Dept: CARDIOLOGY CLINIC | Facility: CLINIC | Age: 76
End: 2021-05-07

## 2021-05-07 DIAGNOSIS — I48.20 CHRONIC ATRIAL FIBRILLATION (HCC): Primary | ICD-10-CM

## 2021-05-07 NOTE — TELEPHONE ENCOUNTER
----- Message from Pennelope Kayser, MD sent at 5/3/2021  2:25 PM EDT -----  Please order and schedule 48 hr holter, I called patient to let him know, he had attempted Dccv that didn't work, heart runing slow in 30-40s

## 2021-05-11 ENCOUNTER — HOSPITAL ENCOUNTER (OUTPATIENT)
Dept: NON INVASIVE DIAGNOSTICS | Facility: CLINIC | Age: 76
Discharge: HOME | End: 2021-05-11
Payer: MEDICARE

## 2021-05-11 DIAGNOSIS — I48.20 CHRONIC ATRIAL FIBRILLATION (HCC): ICD-10-CM

## 2021-05-11 PROCEDURE — 93225 XTRNL ECG REC<48 HRS REC: CPT

## 2021-05-11 PROCEDURE — 93226 XTRNL ECG REC<48 HR SCAN A/R: CPT

## 2021-05-14 PROCEDURE — 93227 XTRNL ECG REC<48 HR R&I: CPT | Performed by: INTERNAL MEDICINE

## 2021-05-19 ENCOUNTER — OFFICE VISIT (OUTPATIENT)
Dept: INTERNAL MEDICINE CLINIC | Facility: CLINIC | Age: 76
End: 2021-05-19
Payer: MEDICARE

## 2021-05-19 VITALS
OXYGEN SATURATION: 91 % | WEIGHT: 146 LBS | BODY MASS INDEX: 21.62 KG/M2 | SYSTOLIC BLOOD PRESSURE: 112 MMHG | DIASTOLIC BLOOD PRESSURE: 68 MMHG | TEMPERATURE: 98.2 F | HEIGHT: 69 IN | HEART RATE: 62 BPM

## 2021-05-19 DIAGNOSIS — E55.9 VITAMIN D DEFICIENCY, UNSPECIFIED: ICD-10-CM

## 2021-05-19 DIAGNOSIS — I48.20 CHRONIC ATRIAL FIBRILLATION (HCC): Primary | ICD-10-CM

## 2021-05-19 DIAGNOSIS — E03.9 ACQUIRED HYPOTHYROIDISM: ICD-10-CM

## 2021-05-19 DIAGNOSIS — C64.2 MALIGNANT NEOPLASM OF LEFT KIDNEY, EXCEPT RENAL PELVIS (HCC): ICD-10-CM

## 2021-05-19 DIAGNOSIS — E78.2 MIXED HYPERLIPIDEMIA: ICD-10-CM

## 2021-05-19 PROBLEM — K21.9 GASTROESOPHAGEAL REFLUX DISEASE WITHOUT ESOPHAGITIS: Status: ACTIVE | Noted: 2021-05-19

## 2021-05-19 PROCEDURE — 99214 OFFICE O/P EST MOD 30 MIN: CPT | Performed by: INTERNAL MEDICINE

## 2021-05-19 NOTE — PROGRESS NOTES
Assessment/Plan:             1  Chronic atrial fibrillation (HCC)  -     Comprehensive metabolic panel; Future  -     Lipid Panel with Direct LDL reflex; Future    2  Acquired hypothyroidism  -     CBC and differential; Future  -     TSH, 3rd generation; Future    3  Mixed hyperlipidemia  -     CBC and differential; Future  -     Comprehensive metabolic panel; Future  -     Lipid Panel with Direct LDL reflex; Future  -     TSH, 3rd generation; Future    4  Malignant neoplasm of left kidney, except renal pelvis ([facility] [address] )    5  Vitamin D deficiency, unspecified           Subjective:      Patient ID: Norma Flynn Sr  is a 67 y o  male  Follow-up on multiple medical problems to ensure they are stable on current medication, shortness of breath better      The following portions of the patient's history were reviewed and updated as appropriate: He  has a past medical history of Dyslipidemia, Erectile dysfunction of non-organic origin, Gastroesophageal reflux, Hyperlipidemia, Hypothyroidism, Liver disease, Malignant neoplasm of kidney ([address] ), Malignant neoplasm of left kidney, except renal pelvis ([address] ), Pure hypercholesterolemia, Thyroid disease, and Vitamin D deficiency, unspecified  He   Patient Active Problem List    Diagnosis Date Noted    Gastroesophageal reflux disease without esophagitis 05/19/2021    Mixed hyperlipidemia 05/19/2021    Shortness of breath 03/22/2021    Medicare annual wellness visit, subsequent 03/22/2021    Chronic atrial fibrillation ([address] ) 03/22/2021    Vitamin D deficiency, unspecified     Pure hypercholesterolemia     Malignant neoplasm of left kidney, except renal pelvis (HCC)     Hyperlipidemia     Hypothyroidism     Gastroesophageal reflux     Renal cell adenocarcinoma, left ([address] ) 05/27/2020     He  has a past surgical history that includes Cystoscopy; Urethra surgery (05/2010); EGD AND COLONOSCOPY (12/15/2009);  Hernia repair (Left); and Cataract extraction (Bilateral, 2014)  His family history includes Cancer in his mother; Heart disease in his father and mother; Hyperlipidemia in his family; Prostate cancer in his father; Stroke in his father  He  reports that he has quit smoking  He has never used smokeless tobacco  He reports previous alcohol use  He reports that he does not use drugs  Current Outpatient Medications   Medication Sig Dispense Refill    apixaban (ELIQUIS) 5 mg Take 1 tablet (5 mg total) by mouth 2 (two) times a day 180 tablet 3    Ascorbic Acid (vitamin C) 1000 MG tablet Take 1,000 mg by mouth daily      cholecalciferol (VITAMIN D3) 1,000 units tablet Take 1,000 Units by mouth daily      Coenzyme Q10 (CoQ10) 100 MG CAPS Take by mouth      furosemide (LASIX) 20 mg tablet Take 1 tablet (20 mg total) by mouth 2 (two) times a day 90 tablet 3    ketoconazole (NIZORAL) 2 % cream APPLY TO AFFECTED AREA(S) ON LEFT HAND AND FEET TWO TIMES DAILY      levothyroxine 75 mcg tablet Take 1 tablet (75 mcg total) by mouth daily in the early morning for 14 days 14 tablet 0    magnesium oxide (MAG-OX) 400 mg Take 400 mg by mouth daily      melatonin 3 mg Take by mouth daily at bedtime      Multiple Vitamins-Minerals (MULTIVITAMIN ADULT PO) Take by mouth every other day       potassium chloride (MICRO-K) 10 MEQ CR capsule Take 2 capsules (20 mEq total) by mouth daily 180 capsule 3    Probiotic Product (CULTURELLE PROBIOTICS PO) Take by mouth      vitamin B-12 (VITAMIN B-12) 1,000 mcg tablet Take by mouth daily       No current facility-administered medications for this visit        Current Outpatient Medications on File Prior to Visit   Medication Sig    apixaban (ELIQUIS) 5 mg Take 1 tablet (5 mg total) by mouth 2 (two) times a day    Ascorbic Acid (vitamin C) 1000 MG tablet Take 1,000 mg by mouth daily    cholecalciferol (VITAMIN D3) 1,000 units tablet Take 1,000 Units by mouth daily    Coenzyme Q10 (CoQ10) 100 MG CAPS Take by mouth    furosemide (LASIX) 20 mg tablet Take 1 tablet (20 mg total) by mouth 2 (two) times a day    ketoconazole (NIZORAL) 2 % cream APPLY TO AFFECTED AREA(S) ON LEFT HAND AND FEET TWO TIMES DAILY    levothyroxine 75 mcg tablet Take 1 tablet (75 mcg total) by mouth daily in the early morning for 14 days    magnesium oxide (MAG-OX) 400 mg Take 400 mg by mouth daily    melatonin 3 mg Take by mouth daily at bedtime    Multiple Vitamins-Minerals (MULTIVITAMIN ADULT PO) Take by mouth every other day     potassium chloride (MICRO-K) 10 MEQ CR capsule Take 2 capsules (20 mEq total) by mouth daily    Probiotic Product (CULTURELLE PROBIOTICS PO) Take by mouth    vitamin B-12 (VITAMIN B-12) 1,000 mcg tablet Take by mouth daily     No current facility-administered medications on file prior to visit  He is allergic to atorvastatin; crestor [rosuvastatin]; and vytorin [ezetimibe-simvastatin]       Review of Systems   Constitutional: Negative for chills and fever  HENT: Negative for congestion, ear pain and sore throat  Eyes: Negative for pain  Respiratory: Positive for shortness of breath  Negative for cough  Cardiovascular: Negative for chest pain and leg swelling  Gastrointestinal: Negative for abdominal pain, nausea and vomiting  Endocrine: Negative for polyuria  Genitourinary: Negative for difficulty urinating, frequency and urgency  Musculoskeletal: Negative for arthralgias and back pain  Skin: Negative for rash  Neurological: Negative for weakness and headaches  Psychiatric/Behavioral: Negative for sleep disturbance  The patient is not nervous/anxious            Objective:      /68 (BP Location: Left arm, Patient Position: Sitting, Cuff Size: Standard)   Pulse 62   Temp 98 2 °F (36 8 °C) (Temporal)   Ht 5' 9" (1 753 m)   Wt 66 2 kg (146 lb)   SpO2 91%   BMI 21 56 kg/m²     Recent Results (from the past 1344 hour(s))   CBC and differential    Collection Time: 04/20/21  7:44 AM   Result Value Ref Range    WBC 4 11 (L) 4 31 - 10 16 Thousand/uL    RBC 3 74 (L) 3 88 - 5 62 Million/uL    Hemoglobin 12 5 12 0 - 17 0 g/dL    Hematocrit 37 8 36 5 - 49 3 %     (H) 82 - 98 fL    MCH 33 4 26 8 - 34 3 pg    MCHC 33 1 31 4 - 37 4 g/dL    RDW 13 3 11 6 - 15 1 %    MPV 9 1 8 9 - 12 7 fL    Platelets 117 976 - 876 Thousands/uL    nRBC 0 /100 WBCs    Neutrophils Relative 48 43 - 75 %    Immat GRANS % 0 0 - 2 %    Lymphocytes Relative 37 14 - 44 %    Monocytes Relative 13 (H) 4 - 12 %    Eosinophils Relative 2 0 - 6 %    Basophils Relative 0 0 - 1 %    Neutrophils Absolute 1 96 1 85 - 7 62 Thousands/µL    Immature Grans Absolute 0 01 0 00 - 0 20 Thousand/uL    Lymphocytes Absolute 1 51 0 60 - 4 47 Thousands/µL    Monocytes Absolute 0 53 0 17 - 1 22 Thousand/µL    Eosinophils Absolute 0 09 0 00 - 0 61 Thousand/µL    Basophils Absolute 0 01 0 00 - 0 10 Thousands/µL   Comprehensive metabolic panel    Collection Time: 04/20/21  7:44 AM   Result Value Ref Range    Sodium 141 136 - 145 mmol/L    Potassium 4 2 3 5 - 5 3 mmol/L    Chloride 103 100 - 108 mmol/L    CO2 30 21 - 32 mmol/L    ANION GAP 8 4 - 13 mmol/L    BUN 26 (H) 5 - 25 mg/dL    Creatinine 1 17 0 60 - 1 30 mg/dL    Glucose, Fasting 94 65 - 99 mg/dL    Calcium 8 9 8 3 - 10 1 mg/dL    AST 22 5 - 45 U/L    ALT 48 12 - 78 U/L    Alkaline Phosphatase 63 46 - 116 U/L    Total Protein 7 3 6 4 - 8 2 g/dL    Albumin 3 9 3 5 - 5 0 g/dL    Total Bilirubin 0 59 0 20 - 1 00 mg/dL    eGFR 60 ml/min/1 73sq m   ECG 12 lead    Collection Time: 04/30/21  8:58 AM   Result Value Ref Range    Ventricular Rate 57 BPM    Atrial Rate 340 BPM    RI Interval  ms    QRSD Interval 82 ms    QT Interval 412 ms    QTC Interval 401 ms    P Axis  degrees    QRS Axis 16 degrees    T Wave Axis 60 degrees   ECG 12 lead    Collection Time: 04/30/21  9:00 AM   Result Value Ref Range    Ventricular Rate 55 BPM    Atrial Rate 375 BPM    RI Interval  ms    QRSD Interval 82 ms    QT Interval 426 ms    QTC Interval 407 ms    P Axis  degrees    QRS Axis 10 degrees    T Wave Axis 56 degrees        Physical Exam  Constitutional:       Appearance: Normal appearance  HENT:      Head: Normocephalic  Right Ear: External ear normal       Left Ear: External ear normal       Nose: Nose normal  No congestion  Mouth/Throat:      Mouth: Mucous membranes are moist       Pharynx: Oropharynx is clear  No oropharyngeal exudate or posterior oropharyngeal erythema  Eyes:      Extraocular Movements: Extraocular movements intact  Conjunctiva/sclera: Conjunctivae normal       Pupils: Pupils are equal, round, and reactive to light  Neck:      Musculoskeletal: Normal range of motion and neck supple  Cardiovascular:      Rate and Rhythm: Normal rate  Rhythm irregular  Heart sounds: Normal heart sounds  No murmur  Pulmonary:      Effort: Pulmonary effort is normal       Breath sounds: Normal breath sounds  No wheezing or rales  Abdominal:      General: Bowel sounds are normal  There is no distension  Palpations: Abdomen is soft  Tenderness: There is no abdominal tenderness  Musculoskeletal: Normal range of motion  Right lower leg: No edema  Left lower leg: No edema  Lymphadenopathy:      Cervical: No cervical adenopathy  Skin:     General: Skin is warm  Neurological:      General: No focal deficit present  Mental Status: He is alert and oriented to person, place, and time

## 2021-05-21 ENCOUNTER — APPOINTMENT (OUTPATIENT)
Dept: LAB | Facility: CLINIC | Age: 76
End: 2021-05-21
Payer: MEDICARE

## 2021-05-21 ENCOUNTER — TRANSCRIBE ORDERS (OUTPATIENT)
Dept: LAB | Facility: CLINIC | Age: 76
End: 2021-05-21

## 2021-05-21 ENCOUNTER — HOSPITAL ENCOUNTER (OUTPATIENT)
Dept: RADIOLOGY | Facility: HOSPITAL | Age: 76
Discharge: HOME | End: 2021-05-21
Attending: UROLOGY
Payer: MEDICARE

## 2021-05-21 DIAGNOSIS — E03.9 ACQUIRED HYPOTHYROIDISM: ICD-10-CM

## 2021-05-21 DIAGNOSIS — E78.2 MIXED HYPERLIPIDEMIA: ICD-10-CM

## 2021-05-21 DIAGNOSIS — I48.20 CHRONIC ATRIAL FIBRILLATION (HCC): ICD-10-CM

## 2021-05-21 DIAGNOSIS — C64.2 MALIGNANT NEOPLASM OF LEFT KIDNEY (HCC): ICD-10-CM

## 2021-05-21 LAB
ALBUMIN SERPL BCP-MCNC: 3.7 G/DL (ref 3.5–5)
ALP SERPL-CCNC: 62 U/L (ref 46–116)
ALT SERPL W P-5'-P-CCNC: 35 U/L (ref 12–78)
ANION GAP SERPL CALCULATED.3IONS-SCNC: 7 MMOL/L (ref 4–13)
AST SERPL W P-5'-P-CCNC: 18 U/L (ref 5–45)
BASOPHILS # BLD AUTO: 0.02 THOUSANDS/ΜL (ref 0–0.1)
BASOPHILS NFR BLD AUTO: 1 % (ref 0–1)
BILIRUB SERPL-MCNC: 0.75 MG/DL (ref 0.2–1)
BUN SERPL-MCNC: 25 MG/DL (ref 5–25)
CALCIUM SERPL-MCNC: 9 MG/DL (ref 8.3–10.1)
CHLORIDE SERPL-SCNC: 101 MMOL/L (ref 100–108)
CHOLEST SERPL-MCNC: 272 MG/DL (ref 50–200)
CO2 SERPL-SCNC: 33 MMOL/L (ref 21–32)
CREAT SERPL-MCNC: 1.19 MG/DL (ref 0.6–1.3)
EOSINOPHIL # BLD AUTO: 0.08 THOUSAND/ΜL (ref 0–0.61)
EOSINOPHIL NFR BLD AUTO: 2 % (ref 0–6)
ERYTHROCYTE [DISTWIDTH] IN BLOOD BY AUTOMATED COUNT: 13.4 % (ref 11.6–15.1)
GFR SERPL CREATININE-BSD FRML MDRD: 59 ML/MIN/1.73SQ M
GLUCOSE P FAST SERPL-MCNC: 97 MG/DL (ref 65–99)
HCT VFR BLD AUTO: 40.7 % (ref 36.5–49.3)
HDLC SERPL-MCNC: 71 MG/DL
HGB BLD-MCNC: 13.2 G/DL (ref 12–17)
IMM GRANULOCYTES # BLD AUTO: 0.01 THOUSAND/UL (ref 0–0.2)
IMM GRANULOCYTES NFR BLD AUTO: 0 % (ref 0–2)
LDLC SERPL CALC-MCNC: 189 MG/DL (ref 0–100)
LYMPHOCYTES # BLD AUTO: 1.35 THOUSANDS/ΜL (ref 0.6–4.47)
LYMPHOCYTES NFR BLD AUTO: 34 % (ref 14–44)
MCH RBC QN AUTO: 33 PG (ref 26.8–34.3)
MCHC RBC AUTO-ENTMCNC: 32.4 G/DL (ref 31.4–37.4)
MCV RBC AUTO: 102 FL (ref 82–98)
MONOCYTES # BLD AUTO: 0.53 THOUSAND/ΜL (ref 0.17–1.22)
MONOCYTES NFR BLD AUTO: 13 % (ref 4–12)
NEUTROPHILS # BLD AUTO: 2.04 THOUSANDS/ΜL (ref 1.85–7.62)
NEUTS SEG NFR BLD AUTO: 50 % (ref 43–75)
NRBC BLD AUTO-RTO: 0 /100 WBCS
PLATELET # BLD AUTO: 160 THOUSANDS/UL (ref 149–390)
PMV BLD AUTO: 9.1 FL (ref 8.9–12.7)
POTASSIUM SERPL-SCNC: 3.9 MMOL/L (ref 3.5–5.3)
PROT SERPL-MCNC: 7.4 G/DL (ref 6.4–8.2)
RBC # BLD AUTO: 4 MILLION/UL (ref 3.88–5.62)
SODIUM SERPL-SCNC: 141 MMOL/L (ref 136–145)
TRIGL SERPL-MCNC: 59 MG/DL
TSH SERPL DL<=0.05 MIU/L-ACNC: 0.88 UIU/ML (ref 0.36–3.74)
WBC # BLD AUTO: 4.03 THOUSAND/UL (ref 4.31–10.16)

## 2021-05-21 PROCEDURE — 80061 LIPID PANEL: CPT

## 2021-05-21 PROCEDURE — 80053 COMPREHEN METABOLIC PANEL: CPT

## 2021-05-21 PROCEDURE — 71046 X-RAY EXAM CHEST 2 VIEWS: CPT

## 2021-05-21 PROCEDURE — 84443 ASSAY THYROID STIM HORMONE: CPT

## 2021-05-21 PROCEDURE — 85025 COMPLETE CBC W/AUTO DIFF WBC: CPT

## 2021-05-21 PROCEDURE — 36415 COLL VENOUS BLD VENIPUNCTURE: CPT

## 2021-05-26 ENCOUNTER — OFFICE VISIT (OUTPATIENT)
Dept: CARDIOLOGY CLINIC | Facility: CLINIC | Age: 76
End: 2021-05-26
Payer: MEDICARE

## 2021-05-26 VITALS
BODY MASS INDEX: 21.89 KG/M2 | OXYGEN SATURATION: 96 % | SYSTOLIC BLOOD PRESSURE: 110 MMHG | WEIGHT: 147.8 LBS | DIASTOLIC BLOOD PRESSURE: 54 MMHG | HEART RATE: 58 BPM | HEIGHT: 69 IN

## 2021-05-26 DIAGNOSIS — I48.91 A-FIB (HCC): Primary | ICD-10-CM

## 2021-05-26 PROCEDURE — 99215 OFFICE O/P EST HI 40 MIN: CPT | Performed by: INTERNAL MEDICINE

## 2021-05-26 PROCEDURE — 93000 ELECTROCARDIOGRAM COMPLETE: CPT | Performed by: INTERNAL MEDICINE

## 2021-05-26 NOTE — PROGRESS NOTES
HEART AND VASCULAR  CARDIAC [address] [facility]    Outpatient f/u Today's Date: 05/26/21        Patient name: Armida Kirk Sr  YOB: 1945  Sex: male         Chief Complaint:f/u afib      ASSESSMENT:  Problem List Items Addressed This Visit     None        68 yo male  1) Afib w slow response, Persistent afib, likely started in Dec w symptoms of fatigue/shortness of breath on exertion  LA moderated dilated 4 6cm, [id number]=0 on eliquis, VENKATESH-dccv did not work, went right back to slow afib 40s  Holter shows afib often <50bpm  Fatigued and tired and some SOB still  2) chronic diastolic chf, improved on lasix we started last month  Maybe related to afib  3) Mild to Moderate MR on TTE, central, functional MR, likely related to afib  PLAN:  1) Recommend dual chamber ppm and DCCV for symptomatic bradycardia, sick sinus syndrome and slow afib, tachy-mckinley, we can start medication for afib aftert this  Will do DCCV again at same time, but this time start propafenone (can give first dose immediatley prior to ppm in holding area, will need to arrange)  PPM must be after Nuc stress         Informed Consent: Risks, benefits, and alternatives to Implantable cardiac device surgery was  discussed with patient and any family present  The patient and/or the patients designated decision maker understands risks, which include but are not limited to life threatening  bleeding, infection, air around lungs, blood around the heart, stroke, open heart surgery, death and reoperation dislodged or malfunctioning device  Reoperation due to device dislodgment is a fairly common complication where more serious complications are rare  2) f/u Nuclear stress  6/9/21 r/o ischemic heart disease so can see if can use class 1c, will start after ppm  WE can start propafenone so can use one agent     No orders of the defined types were placed in this encounter  There are no discontinued medications    HPI/Subjective:     66 yo male  1) Afib w slow response, Persistent afib, likely started in Dec w symptoms of fatigue/shortness of breath on exertion  LA moderated dilated 4 6cm, [id number]=3 on eliquis, VENKATESH-dccv did not work, went right back to slow afib 40s  Holter shows afib often <50bpm  Fatigued and tired and some SOB still  2) chronic diastolic chf, improved on lasix we started last month  Maybe related to afib  3) Mild to Moderate MR on TTE, central, functional MR, likely related to afib  Today denies sob/edema  Better since on lasix, but still tired intermittently and some SOB on exertion  Please note HPI is listed by problem with with update following it, it is copied again in the assessment above and reflects medical decision making as well  Complete 12 point ROS reviewed and otherwise non pertinent or negative except as per HPI pertinent positives in Cardiovascular and Respiratory emphasized  Please see paper chart for outpatient clinic patients where the patient completed the 12 point ROS survey  Past Medical History:   Diagnosis Date    Dyslipidemia     Erectile dysfunction of non-organic origin     Gastroesophageal reflux     Hyperlipidemia     Hypothyroidism     Liver disease     Malignant neoplasm of kidney (HCC)     Left    Malignant neoplasm of left kidney, except renal pelvis (HCC)     Pure hypercholesterolemia     Thyroid disease     Vitamin D deficiency, unspecified        Allergies   Allergen Reactions    Atorvastatin      Muscle pain    Crestor [Rosuvastatin] Dizziness    Vytorin [Ezetimibe-Simvastatin] Dizziness     I reviewed the Home Medication list and Allergies in the chart     Scheduled Meds:  Current Outpatient Medications   Medication Sig Dispense Refill    apixaban (ELIQUIS) 5 mg Take 1 tablet (5 mg total) by mouth 2 (two) times a day 180 tablet 3    Ascorbic Acid (vitamin C) 1000 MG tablet Take 1,000 mg by mouth daily      cholecalciferol (VITAMIN D3) 1,000 units tablet Take 1,000 Units by mouth daily      Coenzyme Q10 (CoQ10) 100 MG CAPS Take by mouth      furosemide (LASIX) 20 mg tablet Take 1 tablet (20 mg total) by mouth 2 (two) times a day 90 tablet 3    ketoconazole (NIZORAL) 2 % cream APPLY TO AFFECTED AREA(S) ON LEFT HAND AND FEET TWO TIMES DAILY      magnesium oxide (MAG-OX) 400 mg Take 400 mg by mouth daily      melatonin 3 mg Take by mouth daily at bedtime      Multiple Vitamins-Minerals (MULTIVITAMIN ADULT PO) Take by mouth every other day       potassium chloride (MICRO-K) 10 MEQ CR capsule Take 2 capsules (20 mEq total) by mouth daily 180 capsule 3    Probiotic Product (CULTURELLE PROBIOTICS PO) Take by mouth      vitamin B-12 (VITAMIN B-12) 1,000 mcg tablet Take by mouth daily      levothyroxine 75 mcg tablet Take 1 tablet (75 mcg total) by mouth daily in the early morning for 14 days 14 tablet 0     No current facility-administered medications for this visit        PRN Meds:         Family History   Problem Relation Age of Onset   Sohail [place] Cancer Mother         bladder    Heart disease Mother     Prostate cancer Father     Stroke Father     Heart disease Father     Hyperlipidemia Family        Social History     Socioeconomic History    Marital status: /Civil Union     Spouse name: Not on file    Number of children: Not on file    Years of education: Not on file    Highest education level: Not on file   Occupational History    Not on file   Social Needs    Financial resource strain: Not on file    Food insecurity     Worry: Not on file     Inability: Not on file   [employer] needs     Medical: Not on file     Non-medical: Not on file   Tobacco Use    Smoking status: Former Smoker    Smokeless tobacco: Never Used    Tobacco comment: quit at age 23 Substance and Sexual Activity    Alcohol use: Not Currently     Comment: rarely    Drug use: No    Sexual activity: Not on file   Lifestyle    Physical activity     Days per week: Not on file     Minutes per session: Not on file    Stress: Not on file   Relationships    Social connections     Talks on phone: Not on file     Gets together: Not on file     Attends Mosque service: Not on file     Active member of club or organization: Not on file     Attends meetings of clubs or organizations: Not on file     Relationship status: Not on file    Intimate partner violence     Fear of current or ex partner: Not on file     Emotionally abused: Not on file     Physically abused: Not on file     Forced sexual activity: Not on file   Other Topics Concern    Not on file   Social History Narrative    Not on file         OBJECTIVE:    /54   Pulse 58   Ht 5' 9" (1 753 m)   Wt 67 kg (147 lb 12 8 oz)   SpO2 96%   BMI 21 83 kg/m²   Vitals:    05/26/21 0827   Weight: 67 kg (147 lb 12 8 oz)       /54   Pulse 58   Ht 5' 9" (1 753 m)   Wt 67 kg (147 lb 12 8 oz)   SpO2 96%   BMI 21 83 kg/m²   Vitals:    05/26/21 0827   Weight: 67 kg (147 lb 12 8 oz)     GEN: No acute distress, Alert and oriented, well appearing  HEENT:External ears normal, wearing a mask  EYES: Pupils equal, sclera anicteric, midline, normal conjuctiva  NECK: No JVD supple, no obvious masses or thryomegaly or goiter  CARDIOVASCULAR: irreg irreg, No murmur, rub, gallops S1,S2  LUNGS: Clear To auscultation bilaterally, normal effort, no rales, rhonchi, crackles   ABDOMEN:  nondistended,  without obvious organomegaly or ascites  EXTREMITIES/VASCULAR: No edema  warm an well perfused  PSYCH: Normal Affect,  linear speech pattern without evidence of psychosis     NEURO: Grossly intact, moving all extremiteis equal, face symmetric, alert and responsive, no obvious focal defecits   GAIT:  Ambulates normally without difficulty  HEME: No bleeding, bruising, petechia, purpura   SKIN: No significant rashes on visibile skin, warm, no diaphoresis or pallor  Lab Results:       LABS:      Chemistry        Component Value Date/Time    K 3 9 2021 0756     2021 0756    CO2 33 (H) 2021 0756    BUN 25 2021 0756    CREATININE 1 19 2021 0756        Component Value Date/Time    CALCIUM 9 0 2021 0756    ALKPHOS 62 2021 0756    AST 18 2021 0756    ALT 35 2021 0756            No results found for: CHOL  Lab Results   Component Value Date    HDL 71 2021     Lab Results   Component Value Date    LDLCALC 189 (H) 2021     Lab Results   Component Value Date    TRIG 59 2021     No results found for: CHOLHDL    IMAGING: Xr Chest Pa & Lateral    Result Date: 3/26/2021  Narrative: CHEST INDICATION:   R06 02: Shortness of breath  COMPARISON:  Chest radiograph from 2019 and abdomen CT from 2020  EXAM PERFORMED/VIEWS:  XR CHEST PA & LATERAL  DUAL ENERGY SUBTRACTION  FINDINGS: Cardiomediastinal silhouette normal  Lungs clear  No effusion or pneumothorax  Mild benign biapical pleural parenchymal scar  Mild degenerative disease in the spine  Impression: No acute cardiopulmonary disease    Workstation performed: [id number]        Cardiac testing:   Results for orders placed during the hospital encounter of 21   Echo complete with contrast if indicated    Narrative [place] [id number], [address] [place] [address]  [phone]    Transthoracic Echocardiogram  2D, M-mode, Doppler, and Color Doppler    Study date:  24-Mar-2021    Patient: Sanford Schilder  MR number: [id number]  Account number: [de-identified]  : 1945  Age: 67 years  Gender: Male  Status: Outpatient  [facility]: [facility]  Height: 69 in  Weight: 149 6 lb  BP: 122/ 72 mmHg    Indications: Atrial fib SOB    Diagnoses: I48 0 - Atrial fibrillation    Sonographer:  Yvonne Harmon Britney Rodrigues  Referring Physician:  Teo Marrero MD  Group:  [facility]  Interpreting Physician:  Ene Rios DO    SUMMARY    PROCEDURE INFORMATION:  This was a technically difficult study  LEFT VENTRICLE:  Systolic function was normal  Ejection fraction was estimated to be 55 %  Although no diagnostic regional wall motion abnormality was identified, this possibility cannot be completely excluded on the basis of this study  RIGHT VENTRICLE:  The ventricle was mildly to moderately dilated  Systolic function was mildly reduced  Wall thickness was increased  LEFT ATRIUM:  The atrium was dilated  RIGHT ATRIUM:  The atrium was dilated  MITRAL VALVE:  There was moderate regurgitation  The regurgitant jet was centrally directed  AORTIC VALVE:  There was mild regurgitation  TRICUSPID VALVE:  There was moderate regurgitation  Estimated peak PA pressure was 41 mmHg  The findings suggest mild pulmonary hypertension  PULMONIC VALVE:  There was mild regurgitation  IVC, HEPATIC VEINS:  The inferior vena cava was mildly dilated  Respirophasic changes were blunted (less than 50% variation)  HISTORY: PRIOR HISTORY: HLD, hypothyroid    PROCEDURE: The study was performed in the [facility]  This was a routine study  The transthoracic approach was used  The study included complete 2D imaging, M-mode, complete spectral Doppler, and color Doppler  The  heart rate was 71 bpm, at the start of the study  Images were obtained from the parasternal, apical, subcostal, and suprasternal notch acoustic windows  Echocardiographic views were limited due to poor acoustic window availability  This  was a technically difficult study  LEFT VENTRICLE: Size was normal  Systolic function was normal  Ejection fraction was estimated to be 55 %   Although no diagnostic regional wall motion abnormality was identified, this possibility cannot be completely excluded on the basis  of this study  Wall thickness was normal  DOPPLER: Normal sinus rhythm was absent  The study was not technically sufficient to allow evaluation of LV diastolic function  RIGHT VENTRICLE: The ventricle was mildly to moderately dilated  Systolic function was mildly reduced  Wall thickness was increased  LEFT ATRIUM: The atrium was dilated  RIGHT ATRIUM: The atrium was dilated  MITRAL VALVE: Valve structure was normal  There was normal leaflet separation  DOPPLER: The transmitral velocity was within the normal range  There was no evidence for stenosis  There was moderate regurgitation  The regurgitant jet was  centrally directed  AORTIC VALVE: The valve was trileaflet  Leaflets exhibited normal cuspal separation and sclerosis  DOPPLER: Transaortic velocity was within the normal range  There was no evidence for stenosis  There was mild regurgitation  TRICUSPID VALVE: The valve structure was normal  There was normal leaflet separation  DOPPLER: The transtricuspid velocity was within the normal range  There was no evidence for stenosis  There was moderate regurgitation  Pulmonary artery  systolic pressure was mildly increased  Estimated peak PA pressure was 41 mmHg  The findings suggest mild pulmonary hypertension  PULMONIC VALVE: Leaflets exhibited normal thickness, no calcification, and normal cuspal separation  DOPPLER: The transpulmonic velocity was within the normal range  There was mild regurgitation  PERICARDIUM: There was no pericardial effusion  The pericardium was normal in appearance  AORTA: The root exhibited normal size  SYSTEMIC VEINS: IVC: The inferior vena cava was mildly dilated  Respirophasic changes were blunted (less than 50% variation)      SYSTEM MEASUREMENT TABLES    2D  %FS: 32 74 %  Ao Diam: 3 49 cm  Ao asc: 3 54 cm  EDV(Teich): 101 42 ml  EF(Teich): 61 17 %  ESV(Teich): 39 38 ml  IVSd: 0 71 cm  LA Diam: 4 63 cm  LVIDd: 4 68 cm  LVIDs: 3 15 cm  LVPWd: 0 73 cm  SV(Teich): 62 04 ml    CW  AV MaxP 84 mmHg  AV Vmax: 0 68 m/s  PV Vmax: 0 75 m/s  PV maxP 25 mmHg  TR MaxP 6 mmHg  TR Vmax: 2 53 m/s    MM  TAPSE: 1 56 cm    PW  E' Sept: 0 1 m/s  E/E' Sept: 6 96  LVOT Vmax: 0 48 m/s  LVOT maxP 93 mmHg  MV Dec Campbell: 5 16 m/s2  MV DecT: 135 86 ms  MV E [place]: 0 69 m/s  MV PHT: 39 4 ms  MVA By PHT: 5 58 cm2  RVOT Vmax: 0 5 m/s  RVOT maxP mmHg    [employer] Accredited Echocardiography Laboratory    Prepared and electronically signed by    Ronda Yan DO  Signed 24-Mar-2021 09:01:07       No results found for this or any previous visit  No results found for this or any previous visit  No results found for this or any previous visit          I reviewed and interpreted the following LABS/EKG/TELE/IMAGING and below is summary of my interpretation (if data available):    LABS:nromal renal function    Current EKG and Rhythm Strip: afib rate controlled 57bpm (was 46bpm on rhythm strip)  HOlter reviewed showing slow afib , 2 9s pause    Past EKGs and RHYTHM strip: afib  EKG

## 2021-05-28 ENCOUNTER — TELEPHONE (OUTPATIENT)
Dept: CARDIOLOGY CLINIC | Facility: CLINIC | Age: 76
End: 2021-05-28

## 2021-05-28 DIAGNOSIS — I48.91 ATRIAL FIBRILLATION, UNSPECIFIED TYPE (HCC): Primary | ICD-10-CM

## 2021-05-28 NOTE — TELEPHONE ENCOUNTER
----- Message from Ping Sandoval MD sent at 5/26/2021  9:17 AM EDT -----  Please schedule dual chamber ppm and dccv with me, needs to be after 6/9 (he has stress test then)  Do not hold eliquis   Thank you

## 2021-06-01 NOTE — TELEPHONE ENCOUNTER
Patient schedule for dual chamber PM/DCCV at [facility] on 6/11/21 with Dr Michelle James  Patient wife aware of general instructions, medication holds (Eliquis)  Patient cover under medicare

## 2021-06-02 ENCOUNTER — HOSPITAL ENCOUNTER (OUTPATIENT)
Dept: CT IMAGING | Facility: HOSPITAL | Age: 76
Discharge: HOME | End: 2021-06-02
Attending: UROLOGY
Payer: MEDICARE

## 2021-06-02 DIAGNOSIS — C64.2 MALIGNANT NEOPLASM OF LEFT KIDNEY (HCC): ICD-10-CM

## 2021-06-02 PROCEDURE — 74178 CT ABD&PLV WO CNTR FLWD CNTR: CPT

## 2021-06-02 PROCEDURE — G1004 CDSM NDSC: HCPCS

## 2021-06-02 RX ADMIN — IODIXANOL 85 ML: 320 INJECTION, SOLUTION INTRAVASCULAR at 08:31

## 2021-06-08 ENCOUNTER — TELEPHONE (OUTPATIENT)
Dept: CARDIOLOGY CLINIC | Facility: CLINIC | Age: 76
End: 2021-06-08

## 2021-06-09 ENCOUNTER — TELEPHONE (OUTPATIENT)
Dept: NON INVASIVE DIAGNOSTICS | Facility: HOSPITAL | Age: 76
End: 2021-06-09

## 2021-06-09 ENCOUNTER — HOSPITAL ENCOUNTER (OUTPATIENT)
Dept: NON INVASIVE DIAGNOSTICS | Facility: CLINIC | Age: 76
Discharge: HOME | End: 2021-06-09

## 2021-06-09 ENCOUNTER — TELEPHONE (OUTPATIENT)
Dept: CARDIOLOGY CLINIC | Facility: CLINIC | Age: 76
End: 2021-06-09

## 2021-06-09 DIAGNOSIS — R00.2 PALPITATIONS: ICD-10-CM

## 2021-06-09 DIAGNOSIS — R06.02 SHORTNESS OF BREATH: ICD-10-CM

## 2021-06-09 DIAGNOSIS — R06.00 DYSPNEA ON EXERTION: ICD-10-CM

## 2021-06-09 DIAGNOSIS — I48.20 CHRONIC ATRIAL FIBRILLATION (HCC): ICD-10-CM

## 2021-06-09 DIAGNOSIS — M25.471 ANKLE EDEMA, BILATERAL: ICD-10-CM

## 2021-06-09 DIAGNOSIS — M25.472 ANKLE EDEMA, BILATERAL: ICD-10-CM

## 2021-06-09 NOTE — TELEPHONE ENCOUNTER
Call from [facility] at [facility]  Patient could not complete his nuclear stress test due to claustrophobia  He is scheduled for DCCV and PM on Friday (6/11/21)  Did you need this done prior to procedure? Please advise  Can PM and DCCV still be done on Friday?

## 2021-06-09 NOTE — TELEPHONE ENCOUNTER
Called patient to reschedule case for tomorrow Thursday 6/10/21 per Dr Ed Franz, patient is ok to have the procedure done on this date

## 2021-06-10 ENCOUNTER — TELEPHONE (OUTPATIENT)
Dept: CARDIOLOGY CLINIC | Facility: CLINIC | Age: 76
End: 2021-06-10

## 2021-06-10 ENCOUNTER — OFFICE VISIT (OUTPATIENT)
Dept: UROLOGY | Facility: AMBULATORY SURGERY CENTER | Age: 76
End: 2021-06-10
Payer: MEDICARE

## 2021-06-10 ENCOUNTER — APPOINTMENT (OUTPATIENT)
Dept: LAB | Facility: CLINIC | Age: 76
End: 2021-06-10
Payer: MEDICARE

## 2021-06-10 VITALS
SYSTOLIC BLOOD PRESSURE: 132 MMHG | HEIGHT: 69 IN | BODY MASS INDEX: 21.89 KG/M2 | HEART RATE: 59 BPM | WEIGHT: 147.8 LBS | DIASTOLIC BLOOD PRESSURE: 70 MMHG

## 2021-06-10 DIAGNOSIS — I48.91 ATRIAL FIBRILLATION, UNSPECIFIED TYPE (HCC): Primary | ICD-10-CM

## 2021-06-10 DIAGNOSIS — I48.20 CHRONIC ATRIAL FIBRILLATION (HCC): Primary | ICD-10-CM

## 2021-06-10 DIAGNOSIS — C64.2 MALIGNANT NEOPLASM OF LEFT KIDNEY, EXCEPT RENAL PELVIS (HCC): Primary | ICD-10-CM

## 2021-06-10 LAB
ALBUMIN SERPL BCP-MCNC: 4.3 G/DL (ref 3.5–5)
ALP SERPL-CCNC: 63 U/L (ref 46–116)
ALT SERPL W P-5'-P-CCNC: 53 U/L (ref 12–78)
ANION GAP SERPL CALCULATED.3IONS-SCNC: 4 MMOL/L (ref 4–13)
AST SERPL W P-5'-P-CCNC: 25 U/L (ref 5–45)
BASOPHILS # BLD AUTO: 0.01 THOUSANDS/ΜL (ref 0–0.1)
BASOPHILS NFR BLD AUTO: 0 % (ref 0–1)
BILIRUB SERPL-MCNC: 0.64 MG/DL (ref 0.2–1)
BUN SERPL-MCNC: 26 MG/DL (ref 5–25)
CALCIUM SERPL-MCNC: 9.6 MG/DL (ref 8.3–10.1)
CHLORIDE SERPL-SCNC: 106 MMOL/L (ref 100–108)
CO2 SERPL-SCNC: 30 MMOL/L (ref 21–32)
CREAT SERPL-MCNC: 1.07 MG/DL (ref 0.6–1.3)
EOSINOPHIL # BLD AUTO: 0.04 THOUSAND/ΜL (ref 0–0.61)
EOSINOPHIL NFR BLD AUTO: 1 % (ref 0–6)
ERYTHROCYTE [DISTWIDTH] IN BLOOD BY AUTOMATED COUNT: 14 % (ref 11.6–15.1)
GFR SERPL CREATININE-BSD FRML MDRD: 67 ML/MIN/1.73SQ M
GLUCOSE SERPL-MCNC: 115 MG/DL (ref 65–140)
HCT VFR BLD AUTO: 38.3 % (ref 36.5–49.3)
HGB BLD-MCNC: 12.5 G/DL (ref 12–17)
IMM GRANULOCYTES # BLD AUTO: 0.01 THOUSAND/UL (ref 0–0.2)
IMM GRANULOCYTES NFR BLD AUTO: 0 % (ref 0–2)
LYMPHOCYTES # BLD AUTO: 1.26 THOUSANDS/ΜL (ref 0.6–4.47)
LYMPHOCYTES NFR BLD AUTO: 27 % (ref 14–44)
MCH RBC QN AUTO: 33.5 PG (ref 26.8–34.3)
MCHC RBC AUTO-ENTMCNC: 32.6 G/DL (ref 31.4–37.4)
MCV RBC AUTO: 103 FL (ref 82–98)
MONOCYTES # BLD AUTO: 0.62 THOUSAND/ΜL (ref 0.17–1.22)
MONOCYTES NFR BLD AUTO: 13 % (ref 4–12)
NEUTROPHILS # BLD AUTO: 2.69 THOUSANDS/ΜL (ref 1.85–7.62)
NEUTS SEG NFR BLD AUTO: 59 % (ref 43–75)
NRBC BLD AUTO-RTO: 0 /100 WBCS
PLATELET # BLD AUTO: 161 THOUSANDS/UL (ref 149–390)
PMV BLD AUTO: 9.8 FL (ref 8.9–12.7)
POTASSIUM SERPL-SCNC: 4.4 MMOL/L (ref 3.5–5.3)
PROT SERPL-MCNC: 7.6 G/DL (ref 6.4–8.2)
RBC # BLD AUTO: 3.73 MILLION/UL (ref 3.88–5.62)
SODIUM SERPL-SCNC: 140 MMOL/L (ref 136–145)
WBC # BLD AUTO: 4.63 THOUSAND/UL (ref 4.31–10.16)

## 2021-06-10 PROCEDURE — 99213 OFFICE O/P EST LOW 20 MIN: CPT | Performed by: UROLOGY

## 2021-06-10 PROCEDURE — 80053 COMPREHEN METABOLIC PANEL: CPT

## 2021-06-10 PROCEDURE — 85025 COMPLETE CBC W/AUTO DIFF WBC: CPT

## 2021-06-10 PROCEDURE — 36415 COLL VENOUS BLD VENIPUNCTURE: CPT

## 2021-06-10 NOTE — TELEPHONE ENCOUNTER
----- Message from Min Cardona MD sent at 6/10/2021  8:34 AM EDT -----  Spoke w patient has Rash he thinks is Eliquis       1) I Instructed him to STOP Eliquis now  2) Needs to  Xarelto 20mg daily to [facility] Friday night with dinner  3) Needs Cardiac Catherization next week since he can't do stress test due claustrophobia- Harriet please arrange  4) please reschedule Pacemaker-DCCV and admission for Sotalol in 3 weeks

## 2021-06-10 NOTE — PROGRESS NOTES
Spoke w patient has Rash he thinks is Eliquis       1) I Instructed him to STOP Eliquis now  2) Needs to  Xarelto 20mg daily to [facility] Friday night with dinner  3) Needs Cardiac Catherization next week since he can't do stress test due claustrophobia- Harriet please arrange  4) please reschedule Pacemaker-DCCV and admission for Sotalol in 3 weeks

## 2021-06-10 NOTE — PROGRESS NOTES
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 without evidence of recurrence  Discussion   I provided time with reassurance that all of his surveillance imaging is negative for recurrence or progression of his small left renal mass which underwent successful cryoablation  He will continue to follow with cardiology as he needs a pacemaker  He continues on Eliquis  Follow-up will be in 6 months with a retroperitoneal ultrasound  Assuming this is stable we will then see him in 1 year with a CT scan  Routine prostate cancer screening can be discontinued secondary to age in stable PSA less than 1 per AUA guidelines  The patient is amenable with this plan  History of Present Illness  67 y o  male with a history of   A left papillary renal cell carcinoma  In June 2020 he underwent percutaneous biopsy of the mass along with cryoablation  Pathology confirmed papillary renal cell  He returns in follow-up today for surveillance  Recent creatinine level is noted to be 1  23   A recent CT scan was performed showing cryoablation of the left lesion without new mass, recurrence, or metastasis  A recent chest x-ray is normal   A PSA from December 2019 is 0 6  AUA Symptom Score  AUA SYMPTOM SCORE      Most Recent Value   AUA SYMPTOM SCORE   How often have you had a sensation of not emptying your bladder completely after you finished urinating? 2   How often have you had to urinate again less than two hours after you finished urinating? 4   How often have you found you stopped and started again several times when you urinate? 2   How often have you found it difficult to postpone urination? 3   How often have you had a weak urinary stream?  1   How often have you had to push or strain to begin urination?   0   How many times did you most typically get up to urinate from the time you went to bed at night until the time you got up in the morning?   1   Quality of Life: If you were to spend the rest of your life with your urinary condition just the way it is now, how would you feel about that?  2   AUA SYMPTOM SCORE  13          Review of Systems  Review of Systems      Past Medical History  Past Medical History:   Diagnosis Date    Dyslipidemia     Erectile dysfunction of non-organic origin     Gastroesophageal reflux     Hyperlipidemia     Hypothyroidism     Liver disease     Malignant neoplasm of kidney ([address]     Left    Malignant neoplasm of left kidney, except renal pelvis ([address] )     Pure hypercholesterolemia     Thyroid disease     Vitamin D deficiency, unspecified        Past Social History  Past Surgical History:   Procedure Laterality Date    CATARACT EXTRACTION Bilateral 2014    CYSTOSCOPY      EGD AND COLONOSCOPY  12/15/2009    HERNIA REPAIR Left     Inguinal    URETHRA SURGERY  05/2010    Done by Dr Thaddeus Yuen        Past Family History  Family History   Problem Relation Age of Onset    Cancer Mother         bladder    Heart disease Mother     Prostate cancer Father     Stroke Father     Heart disease Father     Hyperlipidemia Family        Past Social history  Social History     Socioeconomic History    Marital status: /Civil Union     Spouse name: Not on file    Number of children: Not on file    Years of education: Not on file    Highest education level: Not on file   Occupational History    Not on file   Social Needs    Financial resource strain: Not on file    Food insecurity     Worry: Not on file     Inability: Not on file    Transportation needs     Medical: Not on file     Non-medical: Not on file   Tobacco Use    Smoking status: Former Smoker    Smokeless tobacco: Never Used    Tobacco comment: quit at age 23   Substance and Sexual Activity    Alcohol use: Not Currently     Comment: rarely    Drug use: No    Sexual activity: Not on file   Lifestyle    Physical activity     Days per week: Not on file     Minutes per session: Not on file    Stress: Not on file   Relationships    Social connections     Talks on phone: Not on file     Gets together: Not on file     Attends Mandaen service: Not on file     Active member of club or organization: Not on file     Attends meetings of clubs or organizations: Not on file     Relationship status: Not on file    Intimate partner violence     Fear of current or ex partner: Not on file     Emotionally abused: Not on file     Physically abused: Not on file     Forced sexual activity: Not on file   Other Topics Concern    Not on file   Social History Narrative    Not on file       Current Medications  Current Outpatient Medications   Medication Sig Dispense Refill    apixaban (ELIQUIS) 5 mg Take 1 tablet (5 mg total) by mouth 2 (two) times a day 180 tablet 3    Ascorbic Acid (vitamin C) 1000 MG tablet Take 1,000 mg by mouth daily      cholecalciferol (VITAMIN D3) 1,000 units tablet Take 1,000 Units by mouth daily      Coenzyme Q10 (CoQ10) 100 MG CAPS Take by mouth      furosemide (LASIX) 20 mg tablet Take 1 tablet (20 mg total) by mouth 2 (two) times a day 90 tablet 3    ketoconazole (NIZORAL) 2 % cream APPLY TO AFFECTED AREA(S) ON LEFT HAND AND FEET TWO TIMES DAILY      magnesium oxide (MAG-OX) 400 mg Take 400 mg by mouth daily      melatonin 3 mg Take by mouth daily at bedtime      Multiple Vitamins-Minerals (MULTIVITAMIN ADULT PO) Take by mouth every other day       potassium chloride (MICRO-K) 10 MEQ CR capsule Take 2 capsules (20 mEq total) by mouth daily 180 capsule 3    Probiotic Product (CULTURELLE PROBIOTICS PO) Take by mouth      vitamin B-12 (VITAMIN B-12) 1,000 mcg tablet Take by mouth daily      levothyroxine 75 mcg tablet Take 1 tablet (75 mcg total) by mouth daily in the early morning for 14 days 14 tablet 0     No current facility-administered medications for this visit  Allergies  Allergies   Allergen Reactions    Atorvastatin      Muscle pain    Crestor [Rosuvastatin] Dizziness    Vytorin [Ezetimibe-Simvastatin] Dizziness       Past Medical History, Social History, Family History, medications and allergies were reviewed  Vitals  Vitals:    06/10/21 1003   BP: 132/70   Pulse: 59   Weight: 67 kg (147 lb 12 8 oz)   Height: 5' 9" (1 753 m)       Physical Exam  Physical Exam    On examination he is in no acute distress  Gait normal   Affect normal       Results  Lab Results   Component Value Date    PSA 0 6 12/02/2019    PSA 0 5 09/23/2016     Lab Results   Component Value Date    CALCIUM 9 0 05/21/2021    K 3 9 05/21/2021    CO2 33 (H) 05/21/2021     05/21/2021    BUN 25 05/21/2021    CREATININE 1 19 05/21/2021     Lab Results   Component Value Date    WBC 4 03 (L) 05/21/2021    HGB 13 2 05/21/2021    HCT 40 7 05/21/2021     (H) 05/21/2021     05/21/2021         Office Urine Dip  No results found for this or any previous visit (from the past 1 hour(s))  ]      Total visit time was 20 minutes of which over 50% was spent on counseling

## 2021-06-10 NOTE — TELEPHONE ENCOUNTER
----- Message from Yamileth Moreno MD sent at 6/10/2021  8:34 AM EDT -----  Spoke w patient has Rash he thinks is Eliquis       1) I Instructed him to STOP Eliquis now  2) Needs to  Xarelto 20mg daily to [facility] Friday night with dinner  3) Needs Cardiac Catherization next week since he can't do stress test due claustrophobia- Harriet please arrange  4) please reschedule Pacemaker-DCCV and admission for Sotalol in 3 weeks

## 2021-06-10 NOTE — TELEPHONE ENCOUNTER
Patient schedule for LHC at [facility] on 6/15/21 with Dr Abdirashid Larson  Patient wife aware of general instructions, medications holds (Xarelto-hold two days prior of and lasix-Hold AM of) and labs require  Patient cover under medicare  Patient also schedule for DCCV+PM+Sotalol admission at [facility] on 6/29/21 with Dr Alan Santana  Patient wife aware of general instructions, medications holds (Lasix- hold the Am of)

## 2021-06-14 RX ORDER — ASPIRIN 81 MG/1
324 TABLET, CHEWABLE ORAL ONCE
Status: CANCELLED | OUTPATIENT
Start: 2021-06-14 | End: 2021-06-14

## 2021-06-14 RX ORDER — SODIUM CHLORIDE 9 MG/ML
125 INJECTION, SOLUTION INTRAVENOUS CONTINUOUS
Status: CANCELLED | OUTPATIENT
Start: 2021-06-14

## 2021-06-15 ENCOUNTER — HOSPITAL ENCOUNTER (OUTPATIENT)
Dept: NON INVASIVE DIAGNOSTICS | Facility: HOSPITAL | Age: 76
Discharge: HOME | End: 2021-06-15
Attending: INTERNAL MEDICINE | Admitting: INTERNAL MEDICINE
Payer: MEDICARE

## 2021-06-15 VITALS
BODY MASS INDEX: 21.19 KG/M2 | HEIGHT: 70 IN | TEMPERATURE: 97.8 F | SYSTOLIC BLOOD PRESSURE: 106 MMHG | RESPIRATION RATE: 16 BRPM | OXYGEN SATURATION: 97 % | HEART RATE: 44 BPM | WEIGHT: 148 LBS | DIASTOLIC BLOOD PRESSURE: 59 MMHG

## 2021-06-15 DIAGNOSIS — I48.91 ATRIAL FIBRILLATION, UNSPECIFIED TYPE (HCC): ICD-10-CM

## 2021-06-15 LAB
ANION GAP SERPL CALCULATED.3IONS-SCNC: 8 MMOL/L (ref 4–13)
ATRIAL RATE: 202 BPM
ATRIAL RATE: 78 BPM
BUN SERPL-MCNC: 25 MG/DL (ref 5–25)
CALCIUM SERPL-MCNC: 9 MG/DL (ref 8.3–10.1)
CHLORIDE SERPL-SCNC: 103 MMOL/L (ref 100–108)
CO2 SERPL-SCNC: 30 MMOL/L (ref 21–32)
CREAT SERPL-MCNC: 1.02 MG/DL (ref 0.6–1.3)
GFR SERPL CREATININE-BSD FRML MDRD: 71 ML/MIN/1.73SQ M
GLUCOSE P FAST SERPL-MCNC: 87 MG/DL (ref 65–99)
GLUCOSE SERPL-MCNC: 87 MG/DL (ref 65–140)
POTASSIUM SERPL-SCNC: 3.7 MMOL/L (ref 3.5–5.3)
QRS AXIS: -10 DEGREES
QRS AXIS: -12 DEGREES
QRSD INTERVAL: 80 MS
QRSD INTERVAL: 84 MS
QT INTERVAL: 422 MS
QT INTERVAL: 464 MS
QTC INTERVAL: 431 MS
QTC INTERVAL: 442 MS
SODIUM SERPL-SCNC: 141 MMOL/L (ref 136–145)
T WAVE AXIS: 45 DEGREES
T WAVE AXIS: 46 DEGREES
VENTRICULAR RATE: 52 BPM
VENTRICULAR RATE: 66 BPM

## 2021-06-15 PROCEDURE — 99152 MOD SED SAME PHYS/QHP 5/>YRS: CPT | Performed by: INTERNAL MEDICINE

## 2021-06-15 PROCEDURE — 93005 ELECTROCARDIOGRAM TRACING: CPT

## 2021-06-15 PROCEDURE — 99153 MOD SED SAME PHYS/QHP EA: CPT | Performed by: INTERNAL MEDICINE

## 2021-06-15 PROCEDURE — C1769 GUIDE WIRE: HCPCS | Performed by: INTERNAL MEDICINE

## 2021-06-15 PROCEDURE — 93454 CORONARY ARTERY ANGIO S&I: CPT | Performed by: INTERNAL MEDICINE

## 2021-06-15 PROCEDURE — NC001 PR NO CHARGE: Performed by: STUDENT IN AN ORGANIZED HEALTH CARE EDUCATION/TRAINING PROGRAM

## 2021-06-15 PROCEDURE — 93010 ELECTROCARDIOGRAM REPORT: CPT | Performed by: INTERNAL MEDICINE

## 2021-06-15 PROCEDURE — C1894 INTRO/SHEATH, NON-LASER: HCPCS | Performed by: INTERNAL MEDICINE

## 2021-06-15 PROCEDURE — 80048 BASIC METABOLIC PNL TOTAL CA: CPT | Performed by: INTERNAL MEDICINE

## 2021-06-15 RX ORDER — VERAPAMIL HYDROCHLORIDE 2.5 MG/ML
INJECTION, SOLUTION INTRAVENOUS CODE/TRAUMA/SEDATION MEDICATION
Status: COMPLETED | OUTPATIENT
Start: 2021-06-15 | End: 2021-06-15

## 2021-06-15 RX ORDER — ONDANSETRON 2 MG/ML
4 INJECTION INTRAMUSCULAR; INTRAVENOUS EVERY 6 HOURS PRN
Status: DISCONTINUED | OUTPATIENT
Start: 2021-06-15 | End: 2021-06-15 | Stop reason: HOSPADM

## 2021-06-15 RX ORDER — MIDAZOLAM HYDROCHLORIDE 2 MG/2ML
INJECTION, SOLUTION INTRAMUSCULAR; INTRAVENOUS CODE/TRAUMA/SEDATION MEDICATION
Status: COMPLETED | OUTPATIENT
Start: 2021-06-15 | End: 2021-06-15

## 2021-06-15 RX ORDER — FENTANYL CITRATE 50 UG/ML
INJECTION, SOLUTION INTRAMUSCULAR; INTRAVENOUS CODE/TRAUMA/SEDATION MEDICATION
Status: COMPLETED | OUTPATIENT
Start: 2021-06-15 | End: 2021-06-15

## 2021-06-15 RX ORDER — SODIUM CHLORIDE 9 MG/ML
125 INJECTION, SOLUTION INTRAVENOUS CONTINUOUS
Status: DISCONTINUED | OUTPATIENT
Start: 2021-06-15 | End: 2021-06-15 | Stop reason: HOSPADM

## 2021-06-15 RX ORDER — ASPIRIN 81 MG/1
324 TABLET, CHEWABLE ORAL ONCE
Status: COMPLETED | OUTPATIENT
Start: 2021-06-15 | End: 2021-06-15

## 2021-06-15 RX ORDER — LIDOCAINE HYDROCHLORIDE 10 MG/ML
INJECTION, SOLUTION EPIDURAL; INFILTRATION; INTRACAUDAL; PERINEURAL CODE/TRAUMA/SEDATION MEDICATION
Status: COMPLETED | OUTPATIENT
Start: 2021-06-15 | End: 2021-06-15

## 2021-06-15 RX ORDER — ACETAMINOPHEN 325 MG/1
650 TABLET ORAL EVERY 4 HOURS PRN
Status: DISCONTINUED | OUTPATIENT
Start: 2021-06-15 | End: 2021-06-15 | Stop reason: HOSPADM

## 2021-06-15 RX ORDER — NITROGLYCERIN 20 MG/100ML
INJECTION INTRAVENOUS CODE/TRAUMA/SEDATION MEDICATION
Status: COMPLETED | OUTPATIENT
Start: 2021-06-15 | End: 2021-06-15

## 2021-06-15 RX ORDER — HEPARIN SODIUM 1000 [USP'U]/ML
INJECTION, SOLUTION INTRAVENOUS; SUBCUTANEOUS CODE/TRAUMA/SEDATION MEDICATION
Status: COMPLETED | OUTPATIENT
Start: 2021-06-15 | End: 2021-06-15

## 2021-06-15 RX ORDER — SODIUM CHLORIDE 9 MG/ML
150 INJECTION, SOLUTION INTRAVENOUS CONTINUOUS
Status: DISPENSED | OUTPATIENT
Start: 2021-06-15 | End: 2021-06-15

## 2021-06-15 RX ADMIN — VERAPAMIL HYDROCHLORIDE 2.5 MG: 2.5 INJECTION, SOLUTION INTRAVENOUS at 09:13

## 2021-06-15 RX ADMIN — LIDOCAINE HYDROCHLORIDE 1 ML: 10 INJECTION, SOLUTION EPIDURAL; INFILTRATION; INTRACAUDAL; PERINEURAL at 09:11

## 2021-06-15 RX ADMIN — ASPIRIN 324 MG: 81 TABLET, CHEWABLE ORAL at 07:35

## 2021-06-15 RX ADMIN — MIDAZOLAM 2 MG: 1 INJECTION INTRAMUSCULAR; INTRAVENOUS at 09:10

## 2021-06-15 RX ADMIN — IOHEXOL 70 ML: 350 INJECTION, SOLUTION INTRAVENOUS at 09:20

## 2021-06-15 RX ADMIN — SODIUM CHLORIDE 150 ML/HR: 0.9 INJECTION, SOLUTION INTRAVENOUS at 09:51

## 2021-06-15 RX ADMIN — Medication 200 MCG: at 09:12

## 2021-06-15 RX ADMIN — SODIUM CHLORIDE 125 ML/HR: 0.9 INJECTION, SOLUTION INTRAVENOUS at 07:36

## 2021-06-15 RX ADMIN — HEPARIN SODIUM 4000 UNITS: 1000 INJECTION INTRAVENOUS; SUBCUTANEOUS at 09:13

## 2021-06-15 RX ADMIN — FENTANYL CITRATE 50 MCG: 50 INJECTION INTRAMUSCULAR; INTRAVENOUS at 09:10

## 2021-06-15 NOTE — DISCHARGE INSTRUCTIONS
1  Please see the post cardiac catheterization dishcarge instructions  No heavy lifting, greater than 10 lbs  or strenuous  activity for 48 hrs  2 Remove band aid tomorrow  Shower and wash area- wrist gently with soap and water- beginning tomorrow  Rinse and pat dry  Apply new water seal band aid  Repeat this process for 5 days  No powders, creams lotions or antibiotic ointments  for 5 days  No tub baths, hot tubs or swimming for 5 days  3  Please call our office ([phone]) if you have any fever, redness, swelling, discharge from your wrist access site  4 No driving for 2 days      Left Heart Catheterization   WHAT YOU NEED TO KNOW:   A left heart catheterization is a procedure to look at your heart and its arteries  You may need this procedure if you have chest pain, heart disease, or your heart is not working as it should  DISCHARGE INSTRUCTIONS:   Call 911 for any of the following:   · You have any of the following signs of a heart attack:      ? Squeezing, pressure, or pain in your chest     ? and  any of the following:     ? Discomfort or pain in your back, neck, jaw, stomach, or arm     ? Shortness of breath    ? Nausea or vomiting    ? Lightheadedness or a sudden cold sweat    · You have any of the following signs of a stroke:      ? Numbness or drooping on one side of your face     ? Weakness in an arm or leg    ? Confusion or difficulty speaking    ? Dizziness, a severe headache, or vision loss    Seek care immediately if:   · Your arm or leg feels warm, tender, and painful  It may look swollen and red  · The leg or arm used for your angiogram is numb, painful, or changes color  · The bruise at your catheter site gets bigger or becomes swollen  · Your wound does not stop bleeding even after you apply firm pressure for 15 minutes  · You have weakness in an arm or leg  · You become confused or have difficulty speaking      · You have dizziness, a severe headache, or vision loss  Contact your healthcare provider if:   · You have a fever  · Your catheter site is red, leaks pus, or smells bad  · You have increasing pain at your catheter site  · You have questions or concerns about your condition or care  Limit activity as directed:   · Avoid unnecessary stair climbing for 48 hours, if a catheter was put in your groin  · Do not place pressure on your arm, hand, or wrist, if the catheter was placed in your wrist  Avoid pushing, pulling, or heavy lifting with that arm  · If you need to cough, support the area where the catheter was inserted with your hand  · Ask your healthcare provider how long you need to limit movement and avoid certain activities  · You may feel like resting more after your procedure  Slowly start to do more each day  Rest when you feel it is needed  Keep your bandage clean and dry:  Ask your healthcare provider when you can bathe and shower  Do not take baths or go in pools or hot tubs  Check your site every day for signs of infection such as swelling, redness, or pus  Drink liquids as directed:  Liquids help flush the dye used for your procedure out of your body  Ask your healthcare provider how much liquid to drink each day, and which liquids to drink  Some foods, such as soup and fruit, also provide liquid  Limit alcohol:  Do not drink alcohol for 24 hours after your procedure  Then limit alcohol  Women should limit alcohol to 1 drink a day  Men should limit alcohol to 2 drinks a day  A drink of alcohol is 12 ounces of beer, 5 ounces of wine, or 1½ ounces of liquor  Do not smoke:  Nicotine and other chemicals in cigarettes and cigars can damage your blood vessels  Ask your healthcare provider for information if you currently smoke and need help to quit  E-cigarettes or smokeless tobacco still contain nicotine  Talk to your healthcare provider before you use these products     Follow up with your healthcare provider as directed:  Write down your questions so you remember to ask them during your visits  © Copyright [employer] 2018 Information is for End User's use only and may not be sold, redistributed or otherwise used for commercial purposes  All illustrations and images included in CareNotes® are the copyrighted property of A D A M , Inc  or Nash Welsh  The above information is an  only  It is not intended as medical advice for individual conditions or treatments  Talk to your doctor, nurse or pharmacist before following any medical regimen to see if it is safe and effective for you  Moderate Sedation   WHAT YOU NEED TO KNOW:   Moderate sedation, or conscious sedation, is medicine used during procedures to help you feel relaxed and calm  You will be awake and able to follow directions without anxiety or pain  You will remember little to none of the procedure  You may feel tired, weak, or unsteady on your feet after you get sedation  You may also have trouble concentrating or short-term memory loss  These symptoms should go away in 24 hours or less  DISCHARGE INSTRUCTIONS:   Call 911 or have someone else call for any of the following:   · You have sudden trouble breathing  · You cannot be woken  Return to the emergency department if:   · You have a severe headache or dizziness  · Your heart is beating faster than usual     Contact your healthcare provider if:   · You have a fever  · You have nausea or are vomiting for more than 8 hours after the procedure  · Your skin is itchy, swollen, or you have a rash  · You have questions or concerns about your condition or care  Self-care:   · Have someone stay with you for 24 hours  This person can drive you to errands and help you do things around the house  This person can also watch for problems  · Rest and do quiet activities for 24 hours  Do not exercise, ride a bike, or play sports   Stand up slowly to prevent dizziness and falls  Take short walks around the house with another person  Slowly return to your usual activities the next day  · Do not drive or use dangerous machines or tools for 24 hours  You may injure yourself or others  Examples include a lawnmower, saw, or drill  Do not return to work for 24 hours if you use dangerous machines or tools for work  · Do not make important decisions for 24 hours  For example, do not sign important papers or invest money  · Drink liquids as directed  Liquids help flush the sedation medicine out of your body  Ask how much liquid to drink each day and which liquids are best for you  · Eat small, frequent meals to prevent nausea and vomiting  Start with clear liquids such as juice or broth  If you do not vomit after clear liquids, you can eat your usual foods  · Do not drink alcohol or take medicines that make you drowsy  This includes medicines that help you sleep and anxiety medicines  Ask your healthcare provider if it is safe for you to take pain medicine  Follow up with your healthcare provider as directed:  Write down your questions so you remember to ask them during your visits  © Copyright [address] Information is for End User's use only and may not be sold, redistributed or otherwise used for commercial purposes  All illustrations and images included in CareNotes® are the copyrighted property of A D A M , Inc  or [address] [place], [place] [address]chanda   The above information is an  only  It is not intended as medical advice for individual conditions or treatments  Talk to your doctor, nurse or pharmacist before following any medical regimen to see if it is safe and effective for you

## 2021-06-15 NOTE — H&P
Left Heart Catheterization -  Outpatient H&P  Adelene Records  67 y o  male MRN: [id number]  Unit/Bed#: [facility] [place] [facility] Encounter: [id number]       PCP: Geni Jara MD  Outpatient Cardiologist: Adelaide Vaughn MD    Risk Factors:  - HLD  - past tobacco use    History of Present Illness   HPI:  Jhonatan Urena Sr  is a 67 y o  male who presents for outpatient cath to rule out coronary artery disease pending initiation of antiarrhythmic  Patient is claustrophobic and could not tolerate nuclear stress test per EP note  He has a history of persistent Afib with unsuccessful VENKATESH/DCCV on rivaroxaban, possible apixaban-related rash, mild-mod functional MR, HLD, L renal CA s/p CT-guided cryoablation in remission, GERD, hypothyroidism, past tobacco use and atheroma on VENKATESH        Historical Information   Past Medical History:   Diagnosis Date    Dyslipidemia     Erectile dysfunction of non-organic origin     Gastroesophageal reflux     Hyperlipidemia     Hypothyroidism     Liver disease     Malignant neoplasm of kidney (HCC)     Left    Malignant neoplasm of left kidney, except renal pelvis (HCC)     Pure hypercholesterolemia     Thyroid disease     Vitamin D deficiency, unspecified      Past Surgical History:   Procedure Laterality Date    CATARACT EXTRACTION Bilateral 2014    CYSTOSCOPY      EGD AND COLONOSCOPY  12/15/2009    HERNIA REPAIR Left     Inguinal    URETHRA SURGERY  05/2010    Done by Dr Mitchell Gregory History   Social History     Substance and Sexual Activity   Alcohol Use Not Currently    Comment: rarely     Social History     Substance and Sexual Activity   Drug Use No     Social History     Tobacco Use   Smoking Status Former Smoker   Smokeless Tobacco Never Used   Tobacco Comment    quit at age 23     Family History:   Family History   Problem Relation Age of Onset    Cancer Mother         bladder    Heart disease Mother     Prostate cancer Father     Stroke Father     Heart disease Father     Hyperlipidemia Family        Meds/Allergies   PTA meds:   Prior to Admission Medications   Prescriptions Last Dose Informant Patient Reported? Taking?    Ascorbic Acid (vitamin C) 1000 MG tablet 6/14/2021 at Unknown time Self Yes Yes   Sig: Take 1,000 mg by mouth daily   Coenzyme Q10 (CoQ10) 100 MG CAPS 6/14/2021 at Unknown time Self Yes Yes   Sig: Take by mouth   Multiple Vitamins-Minerals (MULTIVITAMIN ADULT PO) 6/14/2021 at Unknown time Self Yes Yes   Sig: Take by mouth every other day    Probiotic Product (CULTURELLE PROBIOTICS PO) 6/14/2021 at Unknown time Self Yes Yes   Sig: Take by mouth   cholecalciferol (VITAMIN D3) 1,000 units tablet 6/14/2021 at Unknown time Self Yes Yes   Sig: Take 1,000 Units by mouth daily   furosemide (LASIX) 20 mg tablet 6/14/2021 at Unknown time Self No Yes   Sig: Take 1 tablet (20 mg total) by mouth 2 (two) times a day   ketoconazole (NIZORAL) 2 % cream  Self Yes No   Sig: APPLY TO AFFECTED AREA(S) ON LEFT HAND AND FEET TWO TIMES DAILY   levothyroxine 75 mcg tablet 6/15/2021 at Unknown time Self No Yes   Sig: Take 1 tablet (75 mcg total) by mouth daily in the early morning for 14 days   magnesium oxide (MAG-OX) 400 mg 6/14/2021 at Unknown time Self Yes Yes   Sig: Take 400 mg by mouth daily   melatonin 3 mg 6/14/2021 at Unknown time Self Yes Yes   Sig: Take by mouth daily at bedtime   potassium chloride (MICRO-K) 10 MEQ CR capsule 6/14/2021 at Unknown time Self No Yes   Sig: Take 2 capsules (20 mEq total) by mouth daily   rivaroxaban (XARELTO) 20 mg tablet 6/12/2021  No No   Sig: Take 1 tablet (20 mg total) by mouth daily after dinner LOT [id number]   EXP 10/22   vitamin B-12 (VITAMIN B-12) 1,000 mcg tablet 6/14/2021 at Unknown time Self Yes Yes   Sig: Take by mouth daily      Facility-Administered Medications: None     Allergies   Allergen Reactions    Atorvastatin      Muscle pain    Crestor [Rosuvastatin] Dizziness    Vytorin [Ezetimibe-Simvastatin] Dizziness Physical Exam  /62 (BP Location: Right arm)   Pulse 64   Temp 97 8 °F (36 6 °C) (Oral)   Resp 18   Ht 5' 9 5" (1 765 m)   Wt 67 1 kg (148 lb)   SpO2 99%   BMI 21 54 kg/m²       GEN: Simba Dawn Sr  appears well, alert and oriented x 3, pleasant and cooperative   HEENT:  Normocephalic, atraumatic, anicteric  NECK: No JVD  HEART: Irregular rhythm, normal rate, normal S1 and S2, no murmur  LUNGS: Clear to auscultation bilaterally; no wheezes, rales, or rhonchi; respiration nonlabored on room air  ABDOMEN:  Normoactive bowel sounds, soft, no tenderness, no distention  EXTREMITIES: No edema  NEURO: no gross focal findings; cranial nerves grossly intact   SKIN:  Dry, intact, warm to touch  ACCESS: normal right radial Hector's, 2+ right femoral pulse      EKG:   Date: 06/15/21        Previous STRESS TEST:  None    Previous Cath/PCI:  None    ECHO:  Results for orders placed during the hospital encounter of 03/24/21    Echo complete with contrast if indicated    [facility]  Transthoracic Echocardiogram 2D, M-mode, Doppler, and Color Doppler  Study date:  24-Mar-2021    Height: 69 in  Weight: 149 6 lb  BP: 122/ 72 mmHg    Indications: Atrial fib SOB    Diagnoses: I48 0 - Atrial fibrillation    Sonographer:  RACH Grant  Referring Physician:  Nuria Stafford MD  Group:  [facility]  Interpreting Physician:  Nena Severe, DO    SUMMARY  PROCEDURE INFORMATION:  This was a technically difficult study  LEFT VENTRICLE:  Systolic function was normal  Ejection fraction was estimated to be 55 %  Although no diagnostic regional wall motion abnormality was identified, this possibility cannot be completely excluded on the basis of this study  RIGHT VENTRICLE:  The ventricle was mildly to moderately dilated  Systolic function was mildly reduced  Wall thickness was increased  LEFT ATRIUM:  The atrium was dilated      RIGHT ATRIUM:  The atrium was dilated  MITRAL VALVE:  There was moderate regurgitation  The regurgitant jet was centrally directed  AORTIC VALVE:  There was mild regurgitation  TRICUSPID VALVE:  There was moderate regurgitation  Estimated peak PA pressure was 41 mmHg  The findings suggest mild pulmonary hypertension  PULMONIC VALVE:  There was mild regurgitation  IVC, HEPATIC VEINS:  The inferior vena cava was mildly dilated  Respirophasic changes were blunted (less than 50% variation)  HISTORY: PRIOR HISTORY: HLD, hypothyroid    LEFT VENTRICLE: Size was normal  Systolic function was normal  Ejection fraction was estimated to be 55 %  Although no diagnostic regional wall motion abnormality was identified, this possibility cannot be completely excluded on the basis of this study  Wall thickness was normal  DOPPLER: Normal sinus rhythm was absent  The study was not technically sufficient to allow evaluation of LV diastolic function  RIGHT VENTRICLE: The ventricle was mildly to moderately dilated  Systolic function was mildly reduced  Wall thickness was increased  LEFT ATRIUM: The atrium was dilated  RIGHT ATRIUM: The atrium was dilated  MITRAL VALVE: Valve structure was normal  There was normal leaflet separation  DOPPLER: The transmitral velocity was within the normal range  There was no evidence for stenosis  There was moderate regurgitation  The regurgitant jet was centrally directed  AORTIC VALVE: The valve was trileaflet  Leaflets exhibited normal cuspal separation and sclerosis  DOPPLER: Transaortic velocity was within the normal range  There was no evidence for stenosis  There was mild regurgitation  TRICUSPID VALVE: The valve structure was normal  There was normal leaflet separation  DOPPLER: The transtricuspid velocity was within the normal range  There was no evidence for stenosis  There was moderate regurgitation  Pulmonary artery systolic pressure was mildly increased   Estimated peak PA pressure was 41 mmHg  The findings suggest mild pulmonary hypertension  PULMONIC VALVE: Leaflets exhibited normal thickness, no calcification, and normal cuspal separation  DOPPLER: The transpulmonic velocity was within the normal range  There was mild regurgitation  PERICARDIUM: There was no pericardial effusion  The pericardium was normal in appearance  AORTA: The root exhibited normal size  SYSTEMIC VEINS: IVC: The inferior vena cava was mildly dilated  Respirophasic changes were blunted (less than 50% variation)  [address] Accredited Echocardiography Laboratory  Prepared and electronically signed by  Jimi Segura DO  Signed 24-Mar-2021 09:01:07        Lab Results: I have personally reviewed pertinent lab results  Results from last 7 days   Lab Units 06/10/21  1114   POTASSIUM mmol/L 4 4   CO2 mmol/L 30   CHLORIDE mmol/L 106   BUN mg/dL 26*   CREATININE mg/dL 1 07   ALT U/L 53   AST U/L 25             Results from last 7 days   Lab Units 06/10/21  1114   WBC Thousand/uL 4 63   HEMOGLOBIN g/dL 12 5   HEMATOCRIT % 38 3   MCV fL 103*   PLATELETS Thousands/uL 161             Assessment/Plan     Assessment:    1  Persistent atrial fibrillation  - unable to tolerate nuclear stress test due to claustrophobia  - , Cr 1 07, no A1c  - rivaroxaban, furosemide      Plan:  1  Proceed with cardiac catheterization  Case discussed and reviewed with Dr Golden Sotomayor who agrees with my assessment and plan  Thank you for involving us in the care of your patient  Brooklynn Akins MD  Cardiology Fellow PGY-5      ==========================================================================================    Epic/ Allscripts/Care Everywhere records reviewed: yes    ** Please Note: Fluency DirectDictation voice to text software may have been used in the creation of this document   **

## 2021-06-28 ENCOUNTER — TELEPHONE (OUTPATIENT)
Dept: CARDIOLOGY CLINIC | Facility: CLINIC | Age: 76
End: 2021-06-28

## 2021-06-28 NOTE — TELEPHONE ENCOUNTER
Hi Dr Gisela Giraldo,     This patient has a cardioversion/pacer implant scheduled with you tomorrow  He would like to know when to stop the xarelto 20mg  He took a dose last night

## 2021-06-28 NOTE — TELEPHONE ENCOUNTER
He mentioned he usually takes it in the evening  I will call him and tell him to take it around 5 pm today

## 2021-06-29 ENCOUNTER — APPOINTMENT (OUTPATIENT)
Dept: NON INVASIVE DIAGNOSTICS | Facility: HOSPITAL | Age: 76
DRG: 243 | End: 2021-06-29
Attending: INTERNAL MEDICINE
Payer: MEDICARE

## 2021-06-29 ENCOUNTER — APPOINTMENT (INPATIENT)
Dept: RADIOLOGY | Facility: HOSPITAL | Age: 76
DRG: 243 | End: 2021-06-29
Payer: MEDICARE

## 2021-06-29 ENCOUNTER — HOSPITAL ENCOUNTER (INPATIENT)
Dept: NON INVASIVE DIAGNOSTICS | Facility: HOSPITAL | Age: 76
LOS: 2 days | Discharge: HOME | DRG: 243 | End: 2021-07-01
Attending: INTERNAL MEDICINE | Admitting: INTERNAL MEDICINE
Payer: MEDICARE

## 2021-06-29 ENCOUNTER — ANESTHESIA EVENT (OUTPATIENT)
Dept: ANESTHESIOLOGY | Facility: HOSPITAL | Age: 76
End: 2021-06-29

## 2021-06-29 ENCOUNTER — ANESTHESIA (OUTPATIENT)
Dept: NON INVASIVE DIAGNOSTICS | Facility: HOSPITAL | Age: 76
DRG: 243 | End: 2021-06-29
Payer: MEDICARE

## 2021-06-29 ENCOUNTER — ANESTHESIA (OUTPATIENT)
Dept: ANESTHESIOLOGY | Facility: HOSPITAL | Age: 76
End: 2021-06-29

## 2021-06-29 DIAGNOSIS — I48.19 PERSISTENT ATRIAL FIBRILLATION (HCC): Primary | ICD-10-CM

## 2021-06-29 DIAGNOSIS — M25.472 ANKLE EDEMA, BILATERAL: ICD-10-CM

## 2021-06-29 DIAGNOSIS — R00.2 PALPITATIONS: ICD-10-CM

## 2021-06-29 DIAGNOSIS — I48.91 ATRIAL FIBRILLATION, UNSPECIFIED TYPE (HCC): ICD-10-CM

## 2021-06-29 DIAGNOSIS — R06.02 SHORTNESS OF BREATH: ICD-10-CM

## 2021-06-29 DIAGNOSIS — I48.20 CHRONIC ATRIAL FIBRILLATION (HCC): ICD-10-CM

## 2021-06-29 DIAGNOSIS — M25.471 ANKLE EDEMA, BILATERAL: ICD-10-CM

## 2021-06-29 LAB
ANION GAP SERPL CALCULATED.3IONS-SCNC: 4 MMOL/L (ref 4–13)
ATRIAL RATE: 62 BPM
ATRIAL RATE: 94 BPM
BUN SERPL-MCNC: 30 MG/DL (ref 5–25)
CALCIUM SERPL-MCNC: 9.6 MG/DL (ref 8.3–10.1)
CHLORIDE SERPL-SCNC: 102 MMOL/L (ref 100–108)
CO2 SERPL-SCNC: 30 MMOL/L (ref 21–32)
CREAT SERPL-MCNC: 1.11 MG/DL (ref 0.6–1.3)
ERYTHROCYTE [DISTWIDTH] IN BLOOD BY AUTOMATED COUNT: 13.7 % (ref 11.6–15.1)
GFR SERPL CREATININE-BSD FRML MDRD: 64 ML/MIN/1.73SQ M
GLUCOSE P FAST SERPL-MCNC: 95 MG/DL (ref 65–99)
GLUCOSE SERPL-MCNC: 95 MG/DL (ref 65–140)
HCT VFR BLD AUTO: 42.6 % (ref 36.5–49.3)
HGB BLD-MCNC: 13.8 G/DL (ref 12–17)
INR PPP: 1.78 (ref 0.84–1.19)
MCH RBC QN AUTO: 32.9 PG (ref 26.8–34.3)
MCHC RBC AUTO-ENTMCNC: 32.4 G/DL (ref 31.4–37.4)
MCV RBC AUTO: 102 FL (ref 82–98)
P AXIS: 64 DEGREES
PLATELET # BLD AUTO: 191 THOUSANDS/UL (ref 149–390)
PMV BLD AUTO: 9.3 FL (ref 8.9–12.7)
POTASSIUM SERPL-SCNC: 3.4 MMOL/L (ref 3.5–5.3)
PR INTERVAL: 186 MS
PROTHROMBIN TIME: 20.7 SECONDS (ref 11.6–14.5)
QRS AXIS: -12 DEGREES
QRS AXIS: 123 DEGREES
QRSD INTERVAL: 132 MS
QRSD INTERVAL: 80 MS
QT INTERVAL: 426 MS
QT INTERVAL: 494 MS
QTC INTERVAL: 435 MS
QTC INTERVAL: 501 MS
RBC # BLD AUTO: 4.19 MILLION/UL (ref 3.88–5.62)
SODIUM SERPL-SCNC: 136 MMOL/L (ref 136–145)
T WAVE AXIS: 31 DEGREES
T WAVE AXIS: 90 DEGREES
VENTRICULAR RATE: 62 BPM
VENTRICULAR RATE: 63 BPM
WBC # BLD AUTO: 4.37 THOUSAND/UL (ref 4.31–10.16)

## 2021-06-29 PROCEDURE — 85610 PROTHROMBIN TIME: CPT | Performed by: PHYSICIAN ASSISTANT

## 2021-06-29 PROCEDURE — 02HK3JZ INSERTION OF PACEMAKER LEAD INTO RIGHT VENTRICLE, PERCUTANEOUS APPROACH: ICD-10-PCS | Performed by: INTERNAL MEDICINE

## 2021-06-29 PROCEDURE — 93312 ECHO TRANSESOPHAGEAL: CPT | Performed by: INTERNAL MEDICINE

## 2021-06-29 PROCEDURE — 93010 ELECTROCARDIOGRAM REPORT: CPT | Performed by: INTERNAL MEDICINE

## 2021-06-29 PROCEDURE — 92960 CARDIOVERSION ELECTRIC EXT: CPT | Performed by: INTERNAL MEDICINE

## 2021-06-29 PROCEDURE — 0JH636Z INSERTION OF PACEMAKER, DUAL CHAMBER INTO CHEST SUBCUTANEOUS TISSUE AND FASCIA, PERCUTANEOUS APPROACH: ICD-10-PCS | Performed by: INTERNAL MEDICINE

## 2021-06-29 PROCEDURE — 80048 BASIC METABOLIC PNL TOTAL CA: CPT | Performed by: PHYSICIAN ASSISTANT

## 2021-06-29 PROCEDURE — C1785 PMKR, DUAL, RATE-RESP: HCPCS

## 2021-06-29 PROCEDURE — C1892 INTRO/SHEATH,FIXED,PEEL-AWAY: HCPCS | Performed by: INTERNAL MEDICINE

## 2021-06-29 PROCEDURE — B246ZZ4 ULTRASONOGRAPHY OF RIGHT AND LEFT HEART, TRANSESOPHAGEAL: ICD-10-PCS | Performed by: INTERNAL MEDICINE

## 2021-06-29 PROCEDURE — 02H63JZ INSERTION OF PACEMAKER LEAD INTO RIGHT ATRIUM, PERCUTANEOUS APPROACH: ICD-10-PCS | Performed by: INTERNAL MEDICINE

## 2021-06-29 PROCEDURE — NC001 PR NO CHARGE: Performed by: INTERNAL MEDICINE

## 2021-06-29 PROCEDURE — 71045 X-RAY EXAM CHEST 1 VIEW: CPT

## 2021-06-29 PROCEDURE — 85027 COMPLETE CBC AUTOMATED: CPT | Performed by: PHYSICIAN ASSISTANT

## 2021-06-29 PROCEDURE — 33208 INSRT HEART PM ATRIAL & VENT: CPT | Performed by: INTERNAL MEDICINE

## 2021-06-29 PROCEDURE — 93321 DOPPLER ECHO F-UP/LMTD STD: CPT | Performed by: INTERNAL MEDICINE

## 2021-06-29 PROCEDURE — 5A2204Z RESTORATION OF CARDIAC RHYTHM, SINGLE: ICD-10-PCS | Performed by: INTERNAL MEDICINE

## 2021-06-29 PROCEDURE — C1898 LEAD, PMKR, OTHER THAN TRANS: HCPCS

## 2021-06-29 PROCEDURE — 93325 DOPPLER ECHO COLOR FLOW MAPG: CPT | Performed by: INTERNAL MEDICINE

## 2021-06-29 PROCEDURE — 93005 ELECTROCARDIOGRAM TRACING: CPT

## 2021-06-29 PROCEDURE — 93312 ECHO TRANSESOPHAGEAL: CPT

## 2021-06-29 RX ORDER — LIDOCAINE HYDROCHLORIDE 10 MG/ML
INJECTION, SOLUTION EPIDURAL; INFILTRATION; INTRACAUDAL; PERINEURAL CODE/TRAUMA/SEDATION MEDICATION
Status: COMPLETED | OUTPATIENT
Start: 2021-06-29 | End: 2021-06-29

## 2021-06-29 RX ORDER — CHLORHEXIDINE GLUCONATE 0.12 MG/ML
15 RINSE ORAL ONCE
Status: DISCONTINUED | OUTPATIENT
Start: 2021-06-29 | End: 2021-07-01 | Stop reason: HOSPADM

## 2021-06-29 RX ORDER — FAMOTIDINE 20 MG/1
10 TABLET, FILM COATED ORAL ONCE
Status: COMPLETED | OUTPATIENT
Start: 2021-06-29 | End: 2021-06-29

## 2021-06-29 RX ORDER — ACETAMINOPHEN 325 MG/1
650 TABLET ORAL EVERY 4 HOURS PRN
Status: DISCONTINUED | OUTPATIENT
Start: 2021-06-29 | End: 2021-07-01 | Stop reason: HOSPADM

## 2021-06-29 RX ORDER — SODIUM CHLORIDE 9 MG/ML
INJECTION, SOLUTION INTRAVENOUS CONTINUOUS PRN
Status: DISCONTINUED | OUTPATIENT
Start: 2021-06-29 | End: 2021-06-29

## 2021-06-29 RX ORDER — POTASSIUM CHLORIDE 14.9 MG/ML
INJECTION INTRAVENOUS CONTINUOUS PRN
Status: DISCONTINUED | OUTPATIENT
Start: 2021-06-29 | End: 2021-06-29

## 2021-06-29 RX ORDER — OXYCODONE HYDROCHLORIDE 5 MG/1
5 TABLET ORAL EVERY 4 HOURS PRN
Status: DISCONTINUED | OUTPATIENT
Start: 2021-06-29 | End: 2021-07-01 | Stop reason: HOSPADM

## 2021-06-29 RX ORDER — CEFAZOLIN SODIUM 2 G/50ML
2000 SOLUTION INTRAVENOUS ONCE
Status: COMPLETED | OUTPATIENT
Start: 2021-06-29 | End: 2021-06-29

## 2021-06-29 RX ORDER — CLOTRIMAZOLE 1 %
CREAM (GRAM) TOPICAL 2 TIMES DAILY
Status: DISCONTINUED | OUTPATIENT
Start: 2021-06-29 | End: 2021-07-01 | Stop reason: HOSPADM

## 2021-06-29 RX ORDER — SOTALOL HYDROCHLORIDE 80 MG/1
120 TABLET ORAL ONCE
Status: CANCELLED | OUTPATIENT
Start: 2021-06-29

## 2021-06-29 RX ORDER — FUROSEMIDE 20 MG/1
20 TABLET ORAL 2 TIMES DAILY
Status: DISCONTINUED | OUTPATIENT
Start: 2021-06-29 | End: 2021-07-01 | Stop reason: HOSPADM

## 2021-06-29 RX ORDER — LIDOCAINE HYDROCHLORIDE 10 MG/ML
INJECTION, SOLUTION EPIDURAL; INFILTRATION; INTRACAUDAL; PERINEURAL AS NEEDED
Status: DISCONTINUED | OUTPATIENT
Start: 2021-06-29 | End: 2021-06-29

## 2021-06-29 RX ORDER — LEVOTHYROXINE SODIUM 0.07 MG/1
75 TABLET ORAL
Status: DISCONTINUED | OUTPATIENT
Start: 2021-06-29 | End: 2021-07-01 | Stop reason: HOSPADM

## 2021-06-29 RX ORDER — SOTALOL HYDROCHLORIDE 120 MG/1
120 TABLET ORAL EVERY 12 HOURS SCHEDULED
Status: DISCONTINUED | OUTPATIENT
Start: 2021-06-29 | End: 2021-07-01 | Stop reason: HOSPADM

## 2021-06-29 RX ORDER — POTASSIUM CHLORIDE 20 MEQ/1
20 TABLET, EXTENDED RELEASE ORAL DAILY
Status: DISCONTINUED | OUTPATIENT
Start: 2021-06-29 | End: 2021-07-01 | Stop reason: HOSPADM

## 2021-06-29 RX ORDER — PROPOFOL 10 MG/ML
INJECTION, EMULSION INTRAVENOUS AS NEEDED
Status: DISCONTINUED | OUTPATIENT
Start: 2021-06-29 | End: 2021-06-29

## 2021-06-29 RX ORDER — LANOLIN ALCOHOL/MO/W.PET/CERES
3 CREAM (GRAM) TOPICAL
Status: DISCONTINUED | OUTPATIENT
Start: 2021-06-29 | End: 2021-07-01 | Stop reason: HOSPADM

## 2021-06-29 RX ORDER — GENTAMICIN SULFATE 40 MG/ML
INJECTION, SOLUTION INTRAMUSCULAR; INTRAVENOUS CODE/TRAUMA/SEDATION MEDICATION
Status: COMPLETED | OUTPATIENT
Start: 2021-06-29 | End: 2021-06-29

## 2021-06-29 RX ORDER — PROPOFOL 10 MG/ML
INJECTION, EMULSION INTRAVENOUS CONTINUOUS PRN
Status: DISCONTINUED | OUTPATIENT
Start: 2021-06-29 | End: 2021-06-29

## 2021-06-29 RX ADMIN — ACETAMINOPHEN 650 MG: 325 TABLET, FILM COATED ORAL at 14:36

## 2021-06-29 RX ADMIN — SOTALOL HYDROCHLORIDE 120 MG: 120 TABLET ORAL at 07:49

## 2021-06-29 RX ADMIN — SODIUM CHLORIDE: 0.9 INJECTION, SOLUTION INTRAVENOUS at 07:54

## 2021-06-29 RX ADMIN — FUROSEMIDE 20 MG: 20 TABLET ORAL at 19:19

## 2021-06-29 RX ADMIN — FAMOTIDINE 10 MG: 20 TABLET ORAL at 22:41

## 2021-06-29 RX ADMIN — IOHEXOL 10 ML: 350 INJECTION, SOLUTION INTRAVENOUS at 08:51

## 2021-06-29 RX ADMIN — MELATONIN TAB 3 MG 3 MG: 3 TAB at 21:52

## 2021-06-29 RX ADMIN — POTASSIUM CHLORIDE 20 MEQ: 1500 TABLET, EXTENDED RELEASE ORAL at 14:36

## 2021-06-29 RX ADMIN — LIDOCAINE HYDROCHLORIDE 20 ML: 10 INJECTION, SOLUTION EPIDURAL; INFILTRATION; INTRACAUDAL; PERINEURAL at 09:03

## 2021-06-29 RX ADMIN — CEFAZOLIN SODIUM 2000 MG: 2 SOLUTION INTRAVENOUS at 07:32

## 2021-06-29 RX ADMIN — SOTALOL HYDROCHLORIDE 120 MG: 120 TABLET ORAL at 21:52

## 2021-06-29 RX ADMIN — PROPOFOL 60 MCG/KG/MIN: 10 INJECTION, EMULSION INTRAVENOUS at 07:54

## 2021-06-29 RX ADMIN — PROPOFOL 20 MG: 10 INJECTION, EMULSION INTRAVENOUS at 08:24

## 2021-06-29 RX ADMIN — GENTAMICIN SULFATE 80 MG: 40 INJECTION, SOLUTION INTRAMUSCULAR; INTRAVENOUS at 09:29

## 2021-06-29 RX ADMIN — NOREPINEPHRINE BITARTRATE 2 MCG/KG/MIN: 1 INJECTION, SOLUTION, CONCENTRATE INTRAVENOUS at 08:21

## 2021-06-29 RX ADMIN — PROPOFOL 30 MG: 10 INJECTION, EMULSION INTRAVENOUS at 07:54

## 2021-06-29 RX ADMIN — POTASSIUM CHLORIDE: 14.9 INJECTION, SOLUTION INTRAVENOUS at 08:20

## 2021-06-29 RX ADMIN — LIDOCAINE HYDROCHLORIDE 50 MG: 10 INJECTION, SOLUTION EPIDURAL; INFILTRATION; INTRACAUDAL; PERINEURAL at 08:24

## 2021-06-29 RX ADMIN — RIVAROXABAN 20 MG: 20 TABLET, FILM COATED ORAL at 19:19

## 2021-06-29 NOTE — H&P
H&P Exam - Cardiology   Melanie Sutherland Sr  67 y o  male MRN: [id number]  Unit/Bed#: [facility] Encounter: [id number]    Assessment/Plan     Assessment:  1  Persistent atrial fibrillation with slow ventricular response   A ) maintained on Xarelto anticoagulation (developed rash on Eliquis) - CHADS2 Vasc = 2   B ) prior cardioversion 04/2021, however after several shocks he continued to quickly revert back to atrial fibrillation   C ) LA diameter = 4 63 cm  2  Chronic HFpEF, with LVEF 55% per echo 03/2021  3  Nonobstructive CAD per cardiac catheterization 06/2021   A ) 10% distal left main, 50% ostial LAD, 40% mid circumflex   B ) not on statin due to prior intolerance, not on aspirin due to Eliquis use, not on beta-blockers due to bradycardia  4  Moderate mitral regurgitation, likely functional in setting of AFib  5  Hypothyroidism  6  Hyperlipidemia      Plan:  VENKATESH/cardioversion/pacemaker implantation  He will then be admitted for sotalol initiation  History of Present Illness   HPI:  Melanie Sutherland Sr  is a 67y o  year old male with persistent atrial fibrillation, bradycardia, chronic HFpEF, CAD with no occlusive lesions noted on recent catheterization, moderate mitral regurgitation, and hypothyroidism  He established care with Dr Naida Garcia 04/2021 for atrial fibrillation management  This was initially diagnosed 12/2020 by his PCP, and he was started on anticoagulation at that time  When he was seen by Dr Naida Garcia, he was noted to have acute diastolic CHF and was started on low-dose furosemide  He was ultimately transition from Eliquis to Xarelto due to possible rash  He underwent cardioversion attempt later that month, however after multiple attempts he quickly reverted back to atrial fibrillation each time  Given his baseline bradycardia, pacemaker implantation was recommended along with antiarrhythmic initiation    While the original plan was to try a class 1 C agent, ischemic evaluation prior to admission showed nonocclusive CAD  Thus, after pacemaker has been implanted he will be admitted for sotalol initiation  He presents today to have this performed  Review of Systems  ROS as noted above, otherwise 12 point review of systems was performed and is negative         Historical Information   Past Medical History:   Diagnosis Date    Dyslipidemia     Erectile dysfunction of non-organic origin     Gastroesophageal reflux     Hyperlipidemia     Hypothyroidism     Liver disease     Malignant neoplasm of kidney (HCC)     Left    Malignant neoplasm of left kidney, except renal pelvis (HCC)     Pure hypercholesterolemia     Thyroid disease     Vitamin D deficiency, unspecified      Past Surgical History:   Procedure Laterality Date    CATARACT EXTRACTION Bilateral 2014    CYSTOSCOPY      EGD AND COLONOSCOPY  12/15/2009    HERNIA REPAIR Left     Inguinal    URETHRA SURGERY  05/2010    Done by Dr Eileen West      Family History:   Family History   Problem Relation Age of Onset    Cancer Mother         bladder    Heart disease Mother     Prostate cancer Father     Stroke Father     Heart disease Father     Hyperlipidemia Family        Social History   Social History     Substance and Sexual Activity   Alcohol Use Not Currently    Comment: rarely     Social History     Substance and Sexual Activity   Drug Use No     Social History     Tobacco Use   Smoking Status Former Smoker   Smokeless Tobacco Never Used   Tobacco Comment    quit at age 23         Meds/Allergies   all medications and allergies reviewed  Home Medications:   Medications Prior to Admission   Medication    Ascorbic Acid (vitamin C) 1000 MG tablet    cholecalciferol (VITAMIN D3) 1,000 units tablet    Coenzyme Q10 (CoQ10) 100 MG CAPS    furosemide (LASIX) 20 mg tablet    ketoconazole (NIZORAL) 2 % cream    levothyroxine 75 mcg tablet    magnesium oxide (MAG-OX) 400 mg    melatonin 3 mg    Multiple Vitamins-Minerals (MULTIVITAMIN ADULT PO)    potassium chloride (MICRO-K) 10 MEQ CR capsule    Probiotic Product (CULTURELLE PROBIOTICS PO)    rivaroxaban (XARELTO) 20 mg tablet    vitamin B-12 (VITAMIN B-12) 1,000 mcg tablet       Allergies   Allergen Reactions    Atorvastatin      Muscle pain    Crestor [Rosuvastatin] Dizziness    Vytorin [Ezetimibe-Simvastatin] Dizziness       Objective   Vitals: There were no vitals taken for this visit  No intake or output data in the 24 hours ending 21 0648    Invasive Devices     None                 Physical Exam  GEN: NAD, alert and oriented x 3, well appearing  SKIN: dry without significant lesions or rashes  HEENT: NCAT, PERRL, EOMs intact  NECK: No JVD appreciated  CARDIOVASCULAR: irregularly irregular, normal S1, S2 without murmurs, rubs, or gallops appreciated  LUNGS: Clear to auscultation bilaterally without wheezes, rhonchi, or rales  ABDOMEN: Soft, nontender, nondistended  EXTREMITIES/VASCULAR: perfused without clubbing, cyanosis, or LE edema b/l  PSYCH: Normal mood and affect  NEURO: CN ll-Xll grossly intact      Lab Results: I have personally reviewed pertinent lab results  Invalid input(s): LABGLOM              Imaging: I have personally reviewed pertinent reports      Results for orders placed during the hospital encounter of 21    Echo complete with contrast if indicated    Narrative  [place] [address], [address] [place] [address]  [phone]    Transthoracic Echocardiogram  2D, M-mode, Doppler, and Color Doppler    Study date:  24-Mar-2021    Patient: Brenda Varela  MR number: [id number]  Account number: [de-identified]  : 1945  Age: 67 years  Gender: Male  Status: Outpatient  Location: [address] [place] [address] [facility]  Height: 69 in  Weight: 149 6 lb  BP: 122/ 72 mmHg    Indications: Atrial fib SOB    Diagnoses: I48 0 - Atrial fibrillation    Sonographer:  RACH Dykes  Referring Physician:  Pepe Roland Tamika Guadarrama MD  Group:  [facility]  Interpreting Physician:  Claire Friedman DO    SUMMARY    PROCEDURE INFORMATION:  This was a technically difficult study  LEFT VENTRICLE:  Systolic function was normal  Ejection fraction was estimated to be 55 %  Although no diagnostic regional wall motion abnormality was identified, this possibility cannot be completely excluded on the basis of this study  RIGHT VENTRICLE:  The ventricle was mildly to moderately dilated  Systolic function was mildly reduced  Wall thickness was increased  LEFT ATRIUM:  The atrium was dilated  RIGHT ATRIUM:  The atrium was dilated  MITRAL VALVE:  There was moderate regurgitation  The regurgitant jet was centrally directed  AORTIC VALVE:  There was mild regurgitation  TRICUSPID VALVE:  There was moderate regurgitation  Estimated peak PA pressure was 41 mmHg  The findings suggest mild pulmonary hypertension  PULMONIC VALVE:  There was mild regurgitation  IVC, HEPATIC VEINS:  The inferior vena cava was mildly dilated  Respirophasic changes were blunted (less than 50% variation)  HISTORY: PRIOR HISTORY: HLD, hypothyroid    PROCEDURE: The study was performed in the [address] [place] [address] and Vascular Center  This was a routine study  The transthoracic approach was used  The study included complete 2D imaging, M-mode, complete spectral Doppler, and color Doppler  The  heart rate was 71 bpm, at the start of the study  Images were obtained from the parasternal, apical, subcostal, and suprasternal notch acoustic windows  Echocardiographic views were limited due to poor acoustic window availability  This  was a technically difficult study  LEFT VENTRICLE: Size was normal  Systolic function was normal  Ejection fraction was estimated to be 55 %  Although no diagnostic regional wall motion abnormality was identified, this possibility cannot be completely excluded on the basis  of this study   Wall thickness was normal  DOPPLER: Normal sinus rhythm was absent  The study was not technically sufficient to allow evaluation of LV diastolic function  RIGHT VENTRICLE: The ventricle was mildly to moderately dilated  Systolic function was mildly reduced  Wall thickness was increased  LEFT ATRIUM: The atrium was dilated  RIGHT ATRIUM: The atrium was dilated  MITRAL VALVE: Valve structure was normal  There was normal leaflet separation  DOPPLER: The transmitral velocity was within the normal range  There was no evidence for stenosis  There was moderate regurgitation  The regurgitant jet was  centrally directed  AORTIC VALVE: The valve was trileaflet  Leaflets exhibited normal cuspal separation and sclerosis  DOPPLER: Transaortic velocity was within the normal range  There was no evidence for stenosis  There was mild regurgitation  TRICUSPID VALVE: The valve structure was normal  There was normal leaflet separation  DOPPLER: The transtricuspid velocity was within the normal range  There was no evidence for stenosis  There was moderate regurgitation  Pulmonary artery  systolic pressure was mildly increased  Estimated peak PA pressure was 41 mmHg  The findings suggest mild pulmonary hypertension  PULMONIC VALVE: Leaflets exhibited normal thickness, no calcification, and normal cuspal separation  DOPPLER: The transpulmonic velocity was within the normal range  There was mild regurgitation  PERICARDIUM: There was no pericardial effusion  The pericardium was normal in appearance  AORTA: The root exhibited normal size  SYSTEMIC VEINS: IVC: The inferior vena cava was mildly dilated  Respirophasic changes were blunted (less than 50% variation)      SYSTEM MEASUREMENT TABLES    2D  %FS: 32 74 %  Ao Diam: 3 49 cm  Ao asc: 3 54 cm  EDV(Teich): 101 42 ml  EF(Teich): 61 17 %  ESV(Teich): 39 38 ml  IVSd: 0 71 cm  LA Diam: 4 63 cm  LVIDd: 4 68 cm  LVIDs: 3 15 cm  LVPWd: 0 73 cm  SV(Teich): 62 04 ml    CW  AV MaxP 84 mmHg  AV Vmax: 0 68 m/s  PV Vmax: 0 75 m/s  PV maxP 25 mmHg  TR MaxP 6 mmHg  TR Vmax: 2 53 m/s    MM  TAPSE: 1 56 cm    PW  E' Sept: 0 1 m/s  E/E' Sept: 6 96  LVOT Vmax: 0 48 m/s  LVOT maxP 93 mmHg  MV Dec Sullivan: 5 16 m/s2  MV DecT: 135 86 ms  MV E [place]: 0 69 m/s  MV PHT: 39 4 ms  MVA By PHT: 5 58 cm2  RVOT Vmax: 0 5 m/s  RVOT maxP mmHg    [facility]    Prepared and electronically signed by    Steve Ayala DO  Signed 24-Mar-2021 09:01:07      EKG: pending        Code Status: Prior

## 2021-06-29 NOTE — ANESTHESIA POSTPROCEDURE EVALUATION
Post-Op Assessment Note    CV Status:  Stable  Pain Score: 0    Pain management: adequate     Mental Status:  Alert and awake   Hydration Status:  Stable   PONV Controlled:  None   Airway Patency:  Patent and adequate      Post Op Vitals Reviewed: Yes      Staff: Anesthesiologist, CRNA         No complications documented      BP   95/64   Temp      Pulse 64   Resp 14   SpO2 99% ON RA
English

## 2021-06-29 NOTE — ANESTHESIA PREPROCEDURE EVALUATION
Procedure:  CARDIOVERSION (NON INVASIVE ONLY)    Relevant Problems   CARDIO   (+) Chronic atrial fibrillation (HCC)   (+) Hyperlipidemia   (+) Mixed hyperlipidemia   (+) Pure hypercholesterolemia      ENDO   (+) Hypothyroidism      GI/HEPATIC   (+) Gastroesophageal reflux   (+) Gastroesophageal reflux disease without esophagitis      /RENAL   (+) Malignant neoplasm of left kidney, except renal pelvis (HCC)   (+) Renal cell adenocarcinoma, left (HCC)      PULMONARY   (+) Shortness of breath      Cardiac cath 6/15/21  CORONARY VESSELS:   --  The coronary circulation is right dominant  --  There was no angiographic evidence for occlusive coronary artery disease  --  Distal left main: There was a 10 % stenosis  --  Ostial LAD: There was a 50 % stenosis  --  Mid circumflex: There was a 40 % stenosis  --  RCA: Angiography showed minor luminal irregularities      Physical Exam    Airway    Mallampati score: II  TM Distance: >3 FB  Neck ROM: full     Dental   Comment: Permanent implants, poor dentition,     Cardiovascular  Rhythm: irregular,     Pulmonary      Other Findings    TTE 3/24/21  SUMMARY     PROCEDURE INFORMATION:  This was a technically difficult study      LEFT VENTRICLE:  Systolic function was normal  Ejection fraction was estimated to be 55 %  Although no diagnostic regional wall motion abnormality was identified, this possibility cannot be completely excluded on the basis of this study      RIGHT VENTRICLE:  The ventricle was mildly to moderately dilated  Systolic function was mildly reduced  Wall thickness was increased      LEFT ATRIUM:  The atrium was dilated      RIGHT ATRIUM:  The atrium was dilated      MITRAL VALVE:  There was moderate regurgitation  The regurgitant jet was centrally directed      AORTIC VALVE:  There was mild regurgitation      TRICUSPID VALVE:  There was moderate regurgitation  Estimated peak PA pressure was 41 mmHg    The findings suggest mild pulmonary hypertension      PULMONIC VALVE:  There was mild regurgitation      IVC, HEPATIC VEINS:  The inferior vena cava was mildly dilated  Respirophasic changes were blunted (less than 50% variation)  Anesthesia Plan  ASA Score- 3     Anesthesia Type- IV sedation with anesthesia with ASA Monitors  Additional Monitors:   Airway Plan:           Plan Factors-Exercise tolerance (METS): >4 METS  Chart reviewed  EKG reviewed  Imaging results reviewed  Existing labs reviewed  Patient summary reviewed  Induction- intravenous  Postoperative Plan-     Informed Consent- Anesthetic plan and risks discussed with patient  I personally reviewed this patient with the CRNA  Discussed and agreed on the Anesthesia Plan with the CRNA  Shabnam Westbrook

## 2021-06-30 ENCOUNTER — APPOINTMENT (INPATIENT)
Dept: RADIOLOGY | Facility: HOSPITAL | Age: 76
DRG: 243 | End: 2021-06-30
Payer: MEDICARE

## 2021-06-30 LAB
ANION GAP SERPL CALCULATED.3IONS-SCNC: 3 MMOL/L (ref 4–13)
ATRIAL RATE: 64 BPM
BUN SERPL-MCNC: 23 MG/DL (ref 5–25)
CALCIUM SERPL-MCNC: 8.9 MG/DL (ref 8.3–10.1)
CHLORIDE SERPL-SCNC: 104 MMOL/L (ref 100–108)
CO2 SERPL-SCNC: 31 MMOL/L (ref 21–32)
CREAT SERPL-MCNC: 0.92 MG/DL (ref 0.6–1.3)
ERYTHROCYTE [DISTWIDTH] IN BLOOD BY AUTOMATED COUNT: 13.7 % (ref 11.6–15.1)
GFR SERPL CREATININE-BSD FRML MDRD: 80 ML/MIN/1.73SQ M
GLUCOSE SERPL-MCNC: 86 MG/DL (ref 65–140)
HCT VFR BLD AUTO: 37.5 % (ref 36.5–49.3)
HGB BLD-MCNC: 12.4 G/DL (ref 12–17)
MCH RBC QN AUTO: 33.8 PG (ref 26.8–34.3)
MCHC RBC AUTO-ENTMCNC: 33.1 G/DL (ref 31.4–37.4)
MCV RBC AUTO: 102 FL (ref 82–98)
P AXIS: 71 DEGREES
PLATELET # BLD AUTO: 168 THOUSANDS/UL (ref 149–390)
PMV BLD AUTO: 9.9 FL (ref 8.9–12.7)
POTASSIUM SERPL-SCNC: 3.6 MMOL/L (ref 3.5–5.3)
PR INTERVAL: 182 MS
QRS AXIS: 134 DEGREES
QRSD INTERVAL: 132 MS
QT INTERVAL: 496 MS
QTC INTERVAL: 511 MS
RBC # BLD AUTO: 3.67 MILLION/UL (ref 3.88–5.62)
SODIUM SERPL-SCNC: 138 MMOL/L (ref 136–145)
T WAVE AXIS: 69 DEGREES
VENTRICULAR RATE: 64 BPM
WBC # BLD AUTO: 5.39 THOUSAND/UL (ref 4.31–10.16)

## 2021-06-30 PROCEDURE — 93005 ELECTROCARDIOGRAM TRACING: CPT

## 2021-06-30 PROCEDURE — 80048 BASIC METABOLIC PNL TOTAL CA: CPT | Performed by: PHYSICIAN ASSISTANT

## 2021-06-30 PROCEDURE — 93010 ELECTROCARDIOGRAM REPORT: CPT | Performed by: INTERNAL MEDICINE

## 2021-06-30 PROCEDURE — 99024 POSTOP FOLLOW-UP VISIT: CPT | Performed by: INTERNAL MEDICINE

## 2021-06-30 PROCEDURE — 71046 X-RAY EXAM CHEST 2 VIEWS: CPT

## 2021-06-30 PROCEDURE — 85027 COMPLETE CBC AUTOMATED: CPT | Performed by: PHYSICIAN ASSISTANT

## 2021-06-30 RX ORDER — POTASSIUM CHLORIDE 20 MEQ/1
40 TABLET, EXTENDED RELEASE ORAL ONCE
Status: COMPLETED | OUTPATIENT
Start: 2021-06-30 | End: 2021-06-30

## 2021-06-30 RX ADMIN — MELATONIN TAB 3 MG 3 MG: 3 TAB at 22:12

## 2021-06-30 RX ADMIN — POTASSIUM CHLORIDE 20 MEQ: 1500 TABLET, EXTENDED RELEASE ORAL at 09:31

## 2021-06-30 RX ADMIN — FUROSEMIDE 20 MG: 20 TABLET ORAL at 09:31

## 2021-06-30 RX ADMIN — SOTALOL HYDROCHLORIDE 120 MG: 120 TABLET ORAL at 09:31

## 2021-06-30 RX ADMIN — POTASSIUM CHLORIDE 40 MEQ: 1500 TABLET, EXTENDED RELEASE ORAL at 12:51

## 2021-06-30 RX ADMIN — RIVAROXABAN 20 MG: 20 TABLET, FILM COATED ORAL at 19:21

## 2021-06-30 RX ADMIN — SOTALOL HYDROCHLORIDE 120 MG: 120 TABLET ORAL at 22:12

## 2021-06-30 RX ADMIN — LEVOTHYROXINE SODIUM 75 MCG: 75 TABLET ORAL at 05:21

## 2021-06-30 RX ADMIN — MAGNESIUM OXIDE TAB 400 MG (241.3 MG ELEMENTAL MG) 400 MG: 400 (241.3 MG) TAB at 09:31

## 2021-06-30 NOTE — PROGRESS NOTES
Progress Note - Electrophysiology-Cardiology (EP)   Francine Polanco Sr  67 y o  male MRN: [id number]  Unit/Bed#: CW2 218-01 Encounter: [id number]      Assessment:  1  Persistent atrial fibrillation with slow ventricular response, status post Medtronic dual chamber pacemaker implantation and VENKATESH/cardioversion 6/29/2021   A ) currently undergoing sotalol initiation              B ) maintained on Xarelto anticoagulation (developed rash on Eliquis) - CHADS2 Vasc = 2              C ) prior cardioversion 04/2021, however after several shocks he continued to quickly revert back to atrial fibrillation              D ) LA diameter = 4 63 cm  2  Chronic HFpEF, with LVEF 55% per echo 03/2021  3  Nonobstructive CAD per cardiac catheterization 06/2021              A ) 10% distal left main, 50% ostial LAD, 40% mid circumflex              B ) not on statin due to prior intolerance, not on aspirin due to Eliquis use, not on beta-blockers due to bradycardia  4  Moderate mitral regurgitation, likely functional in setting of AFib  5  Hypothyroidism  6  Hyperlipidemia      Plan:  Device interrogation today shows appropriate device function, including lead sensing, thresholds, and impedances  Incision is clean, dry, intact without hematoma, redness, bleeding, drainage, or signs of infection  There is mild overlying swelling, and as this patient is going to remain in the hospital until tomorrow I will place a pressure dressing to ensure that it does not increase in size  It can be removed tomorrow morning for further evaluation  Chest xray this morning shows appropriate lead placement without obvious pneumothorax, official read pending  His QTC is stable on his current dose of sotalol  No significant arrhythmias noted on telemetry, and he is maintaining sinus rhythm  His potassium this morning is 3 6, will give an additional dose this morning and repeat tomorrow      He will need to remain inpatient until he receives several more doses of sotalol  If everything remains stable, potential discharge tomorrow after morning dose and EKG  Subjective/Objective   Chief Complaint:  No acute complaints    Subjective:  His pain near pacemaker incision has improved  He denies chest pain or pressure, shortness of breath, dizziness, lightheadedness, or palpitations  He admits to being slightly tired, as he did not sleep well last night  Objective:     Vitals: /69   Pulse 60   Temp (!) 97 4 °F (36 3 °C)   Resp 18   SpO2 100%   There were no vitals filed for this visit  Orthostatic Blood Pressures      Most Recent Value   Blood Pressure  130/69 filed at 06/30/2021 0645          No intake or output data in the 24 hours ending 06/30/21 1018    Invasive Devices     Peripheral Intravenous Line            Peripheral IV 06/29/21 Left Antecubital 1 day                            Scheduled Meds:  Current Facility-Administered Medications   Medication Dose Route Frequency Provider Last Rate    acetaminophen  650 mg Oral Q4H PRN [facility], PA-RACHNA      chlorhexidine  15 mL Swish & Spit Once [place], [place]      clotrimazole   Topical BID [facility]      furosemide  20 mg Oral BID [facility] PA-RACHNA      levothyroxine  75 mcg Oral Early Morning [facility], PA-RACHNA      magnesium oxide  400 mg Oral Daily [place], [place]      melatonin  3 mg Oral HS [place], [place]      oxyCODONE  5 mg Oral Q4H PRN [facility], PA-RACHNA      potassium chloride  20 mEq Oral Daily [facility], PA-C      potassium chloride  40 mEq Oral Once [facility] PA-RACHNA      rivaroxaban  20 mg Oral After [employer], PA-C      sotalol  120 mg Oral Q12H [address] [facility]       Continuous Infusions:   PRN Meds:   acetaminophen    oxyCODONE      Review of Systems   Constitutional: Positive for malaise/fatigue  Negative for fever     Cardiovascular: Negative for chest pain, dyspnea on exertion, irregular heartbeat, leg swelling, near-syncope, orthopnea, palpitations, paroxysmal nocturnal dyspnea and syncope  Physical Exam:   GEN: NAD, alert and oriented x 3, well appearing  SKIN: dry without significant lesions or rashes  HEENT: NCAT, PERRL, EOMs intact  NECK: No JVD appreciated  CARDIOVASCULAR: RRR, normal S1, S2 without rubs or gallops appreciated; soft AXEL noted  LUNGS: Clear to auscultation bilaterally without wheezes, rhonchi, or rales  ABDOMEN: Soft, nontender, nondistended  EXTREMITIES/VASCULAR: perfused without clubbing, cyanosis, or LE edema b/l  PSYCH: Normal mood and affect  NEURO: CN ll-Xll grossly intact                Lab Results: I have personally reviewed pertinent lab results  Results from last 7 days   Lab Units 21  0552 21  0657   WBC Thousand/uL 5 39 4 37   HEMOGLOBIN g/dL 12 4 13 8   HEMATOCRIT % 37 5 42 6   PLATELETS Thousands/uL 168 191     Results from last 7 days   Lab Units 21  0552 21  0657   POTASSIUM mmol/L 3 6 3 4*   CHLORIDE mmol/L 104 102   CO2 mmol/L 31 30   BUN mg/dL 23 30*   CREATININE mg/dL 0 92 1 11   CALCIUM mg/dL 8 9 9 6     Results from last 7 days   Lab Units 21  0657   INR  1 78*             Imaging: I have personally reviewed pertinent reports      Results for orders placed during the hospital encounter of 21    Echo complete with contrast if indicated    Narrative  [facility] [address], [address] [facility]  [phone]    Transthoracic Echocardiogram  2D, M-mode, Doppler, and Color Doppler    Study date:  24-Mar-2021    Patient: Levon Omalley  MR number: [id number]  Account number: [de-identified]  : 1945  Age: 67 years  Gender: Male  Status: Outpatient  [facility]  Height: 69 in  Weight: 149 6 lb  BP: 122/ 72 mmHg    Indications: Atrial fib SOB    Diagnoses: I48 0 - Atrial fibrillation    Sonographer:  RACH Wells  Referring Physician:  Anjel Yusuf MD  Group:  [facility]  Interpreting Physician:  Saurabh Mitchell DO    SUMMARY    PROCEDURE INFORMATION:  This was a technically difficult study  LEFT VENTRICLE:  Systolic function was normal  Ejection fraction was estimated to be 55 %  Although no diagnostic regional wall motion abnormality was identified, this possibility cannot be completely excluded on the basis of this study  RIGHT VENTRICLE:  The ventricle was mildly to moderately dilated  Systolic function was mildly reduced  Wall thickness was increased  LEFT ATRIUM:  The atrium was dilated  RIGHT ATRIUM:  The atrium was dilated  MITRAL VALVE:  There was moderate regurgitation  The regurgitant jet was centrally directed  AORTIC VALVE:  There was mild regurgitation  TRICUSPID VALVE:  There was moderate regurgitation  Estimated peak PA pressure was 41 mmHg  The findings suggest mild pulmonary hypertension  PULMONIC VALVE:  There was mild regurgitation  IVC, HEPATIC VEINS:  The inferior vena cava was mildly dilated  Respirophasic changes were blunted (less than 50% variation)  HISTORY: PRIOR HISTORY: HLD, hypothyroid    PROCEDURE: The study was performed in the [facility] [place]  This was a routine study  The transthoracic approach was used  The study included complete 2D imaging, M-mode, complete spectral Doppler, and color Doppler  The  heart rate was 71 bpm, at the start of the study  Images were obtained from the parasternal, apical, subcostal, and suprasternal notch acoustic windows  Echocardiographic views were limited due to poor acoustic window availability  This  was a technically difficult study  LEFT VENTRICLE: Size was normal  Systolic function was normal  Ejection fraction was estimated to be 55 %  Although no diagnostic regional wall motion abnormality was identified, this possibility cannot be completely excluded on the basis  of this study  Wall thickness was normal  DOPPLER: Normal sinus rhythm was absent  The study was not technically sufficient to allow evaluation of LV diastolic function  RIGHT VENTRICLE: The ventricle was mildly to moderately dilated  Systolic function was mildly reduced  Wall thickness was increased  LEFT ATRIUM: The atrium was dilated  RIGHT ATRIUM: The atrium was dilated  MITRAL VALVE: Valve structure was normal  There was normal leaflet separation  DOPPLER: The transmitral velocity was within the normal range  There was no evidence for stenosis  There was moderate regurgitation  The regurgitant jet was  centrally directed  AORTIC VALVE: The valve was trileaflet  Leaflets exhibited normal cuspal separation and sclerosis  DOPPLER: Transaortic velocity was within the normal range  There was no evidence for stenosis  There was mild regurgitation  TRICUSPID VALVE: The valve structure was normal  There was normal leaflet separation  DOPPLER: The transtricuspid velocity was within the normal range  There was no evidence for stenosis  There was moderate regurgitation  Pulmonary artery  systolic pressure was mildly increased  Estimated peak PA pressure was 41 mmHg  The findings suggest mild pulmonary hypertension  PULMONIC VALVE: Leaflets exhibited normal thickness, no calcification, and normal cuspal separation  DOPPLER: The transpulmonic velocity was within the normal range  There was mild regurgitation  PERICARDIUM: There was no pericardial effusion  The pericardium was normal in appearance  AORTA: The root exhibited normal size  SYSTEMIC VEINS: IVC: The inferior vena cava was mildly dilated  Respirophasic changes were blunted (less than 50% variation)      SYSTEM MEASUREMENT TABLES    2D  %FS: 32 74 %  Ao Diam: 3 49 cm  Ao asc: 3 54 cm  EDV(Teich): 101 42 ml  EF(Teich): 61 17 %  ESV(Teich): 39 38 ml  IVSd: 0 71 cm  LA Diam: 4 63 cm  LVIDd: 4 68 cm  LVIDs: 3 15 cm  LVPWd: 0 73 cm  SV(Teich): 62 04 ml    CW  AV MaxP 84 mmHg  AV Vmax: 0 68 m/s  PV Vmax: 0 75 m/s  PV maxP 25 mmHg  TR MaxP 6 mmHg  TR Vmax: 2 53 m/s    MM  TAPSE: 1 56 cm    PW  E' Sept: 0 1 m/s  E/E' Sept: 6 96  LVOT Vmax: 0 48 m/s  LVOT maxP 93 mmHg  MV Dec [place]: 5 16 m/s2  MV DecT: 135 86 ms  MV E [place]: 0 69 m/s  MV PHT: 39 4 ms  MVA By PHT: 5 58 cm2  RVOT Vmax: 0 5 m/s  RVOT maxP mmHg    [facility]    Prepared and electronically signed by    Saurabh Mitchell DO  Signed 24-Mar-2021 09:01:07      EKG:         TELEMETRY:  Normal sinus rhythm, sequential AV pacing noted, no significant arrhythmias      VTE Pharmacologic Prophylaxis: Xarelto

## 2021-07-01 VITALS
SYSTOLIC BLOOD PRESSURE: 124 MMHG | DIASTOLIC BLOOD PRESSURE: 69 MMHG | RESPIRATION RATE: 16 BRPM | OXYGEN SATURATION: 95 % | TEMPERATURE: 99.5 F | HEART RATE: 75 BPM

## 2021-07-01 LAB
ANION GAP SERPL CALCULATED.3IONS-SCNC: 3 MMOL/L (ref 4–13)
ATRIAL RATE: 60 BPM
ATRIAL RATE: 61 BPM
ATRIAL RATE: 62 BPM
BUN SERPL-MCNC: 18 MG/DL (ref 5–25)
CALCIUM SERPL-MCNC: 9.1 MG/DL (ref 8.3–10.1)
CHLORIDE SERPL-SCNC: 105 MMOL/L (ref 100–108)
CO2 SERPL-SCNC: 30 MMOL/L (ref 21–32)
CREAT SERPL-MCNC: 0.94 MG/DL (ref 0.6–1.3)
GFR SERPL CREATININE-BSD FRML MDRD: 78 ML/MIN/1.73SQ M
GLUCOSE SERPL-MCNC: 84 MG/DL (ref 65–140)
MAGNESIUM SERPL-MCNC: 2.2 MG/DL (ref 1.6–2.6)
P AXIS: 42 DEGREES
P AXIS: 64 DEGREES
P AXIS: 76 DEGREES
POTASSIUM SERPL-SCNC: 3.8 MMOL/L (ref 3.5–5.3)
PR INTERVAL: 182 MS
PR INTERVAL: 184 MS
PR INTERVAL: 186 MS
QRS AXIS: 131 DEGREES
QRS AXIS: 133 DEGREES
QRS AXIS: 133 DEGREES
QRSD INTERVAL: 116 MS
QRSD INTERVAL: 120 MS
QRSD INTERVAL: 132 MS
QT INTERVAL: 466 MS
QT INTERVAL: 480 MS
QT INTERVAL: 482 MS
QTC INTERVAL: 472 MS
QTC INTERVAL: 480 MS
QTC INTERVAL: 485 MS
SODIUM SERPL-SCNC: 138 MMOL/L (ref 136–145)
T WAVE AXIS: 70 DEGREES
T WAVE AXIS: 71 DEGREES
T WAVE AXIS: 73 DEGREES
VENTRICULAR RATE: 60 BPM
VENTRICULAR RATE: 61 BPM
VENTRICULAR RATE: 62 BPM

## 2021-07-01 PROCEDURE — 80048 BASIC METABOLIC PNL TOTAL CA: CPT | Performed by: PHYSICIAN ASSISTANT

## 2021-07-01 PROCEDURE — 93010 ELECTROCARDIOGRAM REPORT: CPT | Performed by: INTERNAL MEDICINE

## 2021-07-01 PROCEDURE — 93005 ELECTROCARDIOGRAM TRACING: CPT

## 2021-07-01 PROCEDURE — 99024 POSTOP FOLLOW-UP VISIT: CPT | Performed by: INTERNAL MEDICINE

## 2021-07-01 PROCEDURE — 83735 ASSAY OF MAGNESIUM: CPT | Performed by: PHYSICIAN ASSISTANT

## 2021-07-01 RX ORDER — FUROSEMIDE 20 MG/1
20 TABLET ORAL DAILY
Qty: 90 TABLET | Refills: 0
Start: 2021-07-01 | End: 2021-07-01 | Stop reason: SDUPTHER

## 2021-07-01 RX ORDER — SOTALOL HYDROCHLORIDE 120 MG/1
120 TABLET ORAL EVERY 12 HOURS SCHEDULED
Qty: 60 TABLET | Refills: 3 | Status: SHIPPED | OUTPATIENT
Start: 2021-07-01 | End: 2021-10-25 | Stop reason: SDUPTHER

## 2021-07-01 RX ORDER — FUROSEMIDE 20 MG/1
20 TABLET ORAL DAILY PRN
Qty: 90 TABLET | Refills: 0
Start: 2021-07-01

## 2021-07-01 RX ADMIN — LEVOTHYROXINE SODIUM 75 MCG: 75 TABLET ORAL at 07:07

## 2021-07-01 RX ADMIN — MAGNESIUM OXIDE TAB 400 MG (241.3 MG ELEMENTAL MG) 400 MG: 400 (241.3 MG) TAB at 08:59

## 2021-07-01 RX ADMIN — FUROSEMIDE 20 MG: 20 TABLET ORAL at 08:59

## 2021-07-01 RX ADMIN — POTASSIUM CHLORIDE 20 MEQ: 1500 TABLET, EXTENDED RELEASE ORAL at 08:59

## 2021-07-01 RX ADMIN — SOTALOL HYDROCHLORIDE 120 MG: 120 TABLET ORAL at 08:59

## 2021-07-01 NOTE — CASE MANAGEMENT
LOS Day 2    No CM needs identified at this time; No CM consult  CM will continue to be available for discharge planning needs

## 2021-07-01 NOTE — DISCHARGE INSTRUCTIONS
Please take lasix on only an as needed basis  Check your weight daily  If you gain 3 pounds in one day, 5 pounds in one week, or experience worsening shortness of breath or increasing lower leg swelling please call our office at [phone]  Please refer to this medication list and contact our office prior to beginning any new medications while on Sotalol:          PACEMAKER INSTRUCTIONS    Please refer to post pacemaker implantation discharge instructions and restrictions and your pacemaker booklet/temporary card  Keep incision dry for one week  Do not use lotions/powders/creams on incision  Leave outer bandage in place for 1 week - it is water proof, and as long as it is fully adhered to your skin you may shower with it  If it appears as though the bandage is coming off and/or there is any communication to the area of device incision, please then keep the whole area dry for the remaining week  After 1 week, please remove by pulling all edges away from the center of the bandage  No overhead reaching/pushing/pulling/lifting greater than 5-10lbs with left arm for six weeks  Please call the office if you notice redness, swelling, bleeding, or drainage from incision or if you develop fevers  AFTER PACEMAKER CARE:    If you have any questions, please call [phone] to speak with a nurse (8:30am-4pm, or [phone] after hours)  For appointments, please call [phone]  WHAT YOU SHOULD KNOW:   A pacemaker is a small, battery-powered device that is placed under your skin in your upper chest area with wires placed through a vein that lead directly into the heart  It helps regulate your heart rate and prevent your heart from beating too slowly  AFTER YOU LEAVE:     Medicines:     · Pain medicine: You may need medicine to take away or decrease pain  ¨ Learn how to take your medicine  Ask what medicine and how much you should take   Be sure you know how, when, and how often to take it  Usually Over the counter pain medicine is sufficient to control pain (Acetominophen or Ibuprofen) Ask your doctor if you may take these  If this does not control your pain, narcotic pain killers may be prescribed, please call if you need prescription  ¨ Do not wait until the pain is severe before you take your medicine  Tell caregivers if your pain does not decrease  ¨ Pain medicine can make you dizzy or sleepy  Prevent falls by calling someone when you get out of bed or if you need help  Take your medicine as directed  Call your healthcare provider if you think your medicine is not helping or if you have side effects  Tell him if you are allergic to any medicine  Follow up with your cardiologist after your procedure: You will need a follow-up visit approximately 2 weeks after you leave the hospital  Your cardiologist will check your wound and make sure that your pacemaker is working correctly  Follow the instructions to check your pacemaker: Your cardiologist or primary healthcare provider will check your pacemaker and the battery regularly  He will use a computer to check your pacemaker over the telephone or wireless device which will be given to you  Pacemaker batteries usually last 8 to 10 years  The pacemaker unit will be replaced when the battery gets low  This is a simpler procedure than the original one to implant your pacemaker  Wound care:  Keep your incision dry for one week  Do not use lotions/powders/creams on incision  Leave outer bandage in place for 1 week - it is water proof, and as long as it is fully adhered to your skin you may shower with it  If it appears as though the bandage is coming off and/or there is any communication to the area of device incision, please then keep the whole area dry for the remaining week  After 1 week, please remove by pulling all edges away from the center of the bandage      Please call the office if you notice redness, swelling, bleeding, or drainage from incision or if you develop fevers  Activity:   · Arm movement and lifting:  Be careful using the arm on the side of your pacemaker  Do not move your arm for the first 24 hours after your procedure  Do not  lift your arm above your shoulder or lift more than 10 pounds for one month after your procedure  Avoid pushing, pulling, or repetitive arm movements for one month  This helps the leads stay in place and helps your wound heal  Ask your caregiver when you can drive after your procedure  You may move your arm side to side without lifting above your shoulder, and do not need to wear a sling at home  · Driving: you are ok to drive 48 hours after pacemaker is implanted   · Sports:  Ask your caregiver when it is okay to play tennis, golf, basketball, or any sport that requires you to lift your arms  Do not play full contact sports, such as football, that could damage your pacemaker  Ask your cardiologist or primary healthcare provider how much and what kinds of physical activity are safe for you  Living with a pacemaker:   · Tell all caregivers you have a pacemaker: This includes surgeons, radiologists, and medical technicians  You may want to wear a medical alert ID bracelet or necklace that states that you have a pacemaker  · Carry your pacemaker ID card: Make sure you receive a pacemaker ID card  Carry it with you at all times  It lists important information about your pacemaker  Show it to airport security if you travel  · Avoid electrical interference:  Avoid welding equipment and other equipment with large magnets or electric fields  These things could interfere with how your pacemaker works  Use your cell phone on the ear opposite from your pacemaker  Do not carry your cell phone in your shirt pocket over your chest      · Some Pacemakers are MRI safe   Ask you doctor if it is safe to proceed with MRI and let the radiologist and staff know you have a pacemaker  · Do not touch the skin around your pacemaker: This can cause damage to the lead wires or move the pacemaker unit from where it should be  Contact your cardiologist or primary healthcare provider if:   · The area around your pacemaker has increasing amount of pain after surgery  The pain should improve over first few days after implantation  · The skin around your stitches has increasing redness, swelling, or has drainage  This may mean that you have an infection  · You have a fever  · You have chills, a cough, and feel weak or achy  These are also signs of infection  · Your feet or ankles are more swollen than your baseline  · Your Heart rate is less than 50 beats per minute     Seek care immediately if:   · Your bandage becomes soaked with blood  · Your pacemaker is swelling rapidly    · Your stitches open up  · You feel your heart suddenly beating very slowly or quickly  · You become too weak or dizzy to stand, or you pass out  · Your arm or leg feels warm, tender, and painful  It may look swollen and red  · You have chest pain that does not go away with rest or medicine  · You feel lightheaded, short of breath, and have chest pain  · You cough up blood  © 2014 [address] is for End User's use only and may not be sold, redistributed or otherwise used for commercial purposes  All illustrations and images included in CareNotes® are the copyrighted property of [employer]  or Sree Ibarra  The above information is an  only  It is not intended as medical advice for individual conditions or treatments  Talk to your doctor, nurse or pharmacist before following any medical regimen to see if it is safe and effective for you

## 2021-07-01 NOTE — DISCHARGE SUMMARY
Discharge Summary - Magdaleno Ramos Sr  67 y o  male MRN: [id number]    Unit/Bed#: CW2 218-01 Encounter: [id number]      Admission Date: 6/29/2021   Discharge Date: 7/1/2021    Discharge Diagnosis:   1  Persistent atrial fibrillation with slow ventricular response, status post Medtronic dual chamber pacemaker implantation and VENKATESH/cardioversion 6/29/2021              A ) status post sotalol initiation              B ) maintained on Xarelto anticoagulation (developed rash on Eliquis) - CHADS2 Vasc = 2              C ) prior cardioversion 04/2021, however after several shocks he continued to quickly revert back to atrial fibrillation              D ) LA diameter = 4 63 cm  2  Chronic HFpEF, with LVEF 55% per echo 03/2021 -   3  Nonobstructive CAD per cardiac catheterization 06/2021              A ) 10% distal left main, 50% ostial LAD, 40% mid circumflex              B ) not on statin due to prior intolerance, not on aspirin due to Eliquis use, not on beta-blockers due to bradycardia  4  Moderate mitral regurgitation, likely functional in setting of AFib  5  Hypothyroidism  6  Hyperlipidemia      Procedures Performed:   Orders Placed This Encounter   Procedures    Cardiac eps/pacer implant   1) Dual Chamber Permanent Pacemaker Implantation- HIS/Left bundle pacing w deep septal lead to preserve narrow QRS  2) DC Cardioversion (he was in afib at baseline)  3) VENKATESH      Consultants: none      HPI: Please refer to the initial history and physical as well as procedure notes for full details  Briefly, Emilia Sage  is a 67y o  year old male with persistent atrial fibrillation, bradycardia, chronic HFpEF, CAD with no occlusive lesions noted on recent catheterization, moderate mitral regurgitation, and hypothyroidism  He established care with Dr Maggie Garcia 04/2021 for atrial fibrillation management  This was initially diagnosed 12/2020 by his PCP, and he was started on anticoagulation at that time    When he was seen by Dr Steven Wilkinson, he was noted to have acute diastolic CHF and was started on low-dose furosemide  He was ultimately transition from Eliquis to Xarelto due to possible rash  He underwent cardioversion attempt later that month, however after multiple attempts he quickly reverted back to atrial fibrillation each time  Given his baseline bradycardia, pacemaker implantation was recommended along with antiarrhythmic initiation  While the original plan was to try a class 1 C agent, ischemic evaluation prior to admission showed nonocclusive CAD  Thus, after pacemaker has been implanted he will be admitted for sotalol initiation  He presented this hospital admission to undergo this procedure  Hospital Course: [facility]  presented at his [facility] of health  After the procedure was explained in detail and consent was obtained, he underwent the above procedures without complications  In the immediate preop setting, he was given a dose of sotalol  Please see operative notes by Dr Steven Wilkinson for full details  He tolerated the procedure well  CXR immediately following the procedure showed appropriate lead placement without pneumothorax  He was then monitored for another several days for sotalol initiation  There were no acute issues or events overnight  The following morning he denied all cardiac complaints, including chest pain/pressure, dyspnea, palpitations, dizziness, lightheadedness, or syncope  His vital signs were reviewed, labs showed decreased potassium which was repleted  Telemetry showed normal sinus rhythm, AV sequential pacing, without arrhythmias  Chest xray that morning again showed appropriate device placement without pneumothorax  Device interrogation showed appropriate device function, including lead sensing, threshold, and impedance  His incision was clean, dry, and intact without obvious hematoma, redness, bleeding, drainage, or signs of infection   There was some slight swelling noted, thus a pressure dressing was placed to avoid any worsening  He was kept an additional night for ongoing sotalol monitoring  After 5 doses of sotalol, his QTC remained stable and he maintained sinus rhythm  No arrhythmias were noted on telemetry, and he tolerated this medication well  He received education regarding Sotalol therapy, including avoiding missed doses, pharmacy moitoring for drug to drug interactions, and concerning signs and symptoms that would prompt urgent evaluation  On postop day 2 , he was deemed stable for discharge  His pressure dressing was removed and there was no ongoing evidence of swelling or hematoma noted over device  He was asymptomatic, and denied all symptoms  Review of Systems   Constitutional: Negative for fever and malaise/fatigue  Cardiovascular: Negative for chest pain, dyspnea on exertion, irregular heartbeat, leg swelling, near-syncope, orthopnea, palpitations, paroxysmal nocturnal dyspnea and syncope  All other systems reviewed and are negative  Physical exam on the day of discharge:  GEN: NAD, alert and oriented x 3, well appearing  SKIN: dry without significant lesions or rashes  HEENT: NCAT, PERRL, EOMs intact  NECK: No JVD appreciated  CARDIOVASCULAR: RRR, normal S1, S2 without murmurs, rubs, or gallops appreciated  LUNGS: Clear to auscultation bilaterally without wheezes, rhonchi, or rales  ABDOMEN: Soft, nontender, nondistended  EXTREMITIES/VASCULAR: perfused without clubbing, cyanosis, or LE edema b/l  PSYCH: Normal mood and affect  NEURO: CN ll-Xll grossly intact    He was given routine post implantation discharge instructions and restrictions, including wound care, and these were explained in detail  He was instructed to keep the incision dry for one week   He was given a one week ECG follow up, a two week follow up appointment with our device clinic for device interrogation and site check, and a 4-6 week post hospital follow up with Shelli Juárez PA-C for ongoing atrial fibrillation management  In terms of his medications, he will be discharged on sotalol 120 mg twice daily  He will be continued on Xarelto anticoagulation as previously recommended  He was previously taking Lasix 20 mg b i d  For diastolic CHF, however has been taking it daily throughout this admission and feeling well overall  Now that he is maintaining sinus rhythm, he will likely not need diuretics moving forward  We have asked him to take it on an as-needed basis only  He was instructed to monitor his weight daily and to check for worsening swelling or shortness of breath  He will contact us if the symptoms recur, and it may need to be re-initiated  He is stable for discharge at this time with all questions answered  He was discussed in detail with Dr Hermelinda Yen who is in agreement with this discharge summary  Discharge Medications:  See after visit summary for reconciled discharge medications provided to patient and family      Medications Prior to Admission   Medication    Ascorbic Acid (vitamin C) 1000 MG tablet    cholecalciferol (VITAMIN D3) 1,000 units tablet    Coenzyme Q10 (CoQ10) 100 MG CAPS    furosemide (LASIX) 20 mg tablet    ketoconazole (NIZORAL) 2 % cream    magnesium oxide (MAG-OX) 400 mg    melatonin 3 mg    Multiple Vitamins-Minerals (MULTIVITAMIN ADULT PO)    potassium chloride (MICRO-K) 10 MEQ CR capsule    Probiotic Product (CULTURELLE PROBIOTICS PO)    rivaroxaban (XARELTO) 20 mg tablet    vitamin B-12 (VITAMIN B-12) 1,000 mcg tablet    levothyroxine 75 mcg tablet         Current Facility-Administered Medications   Medication Dose Route Frequency    acetaminophen (TYLENOL) tablet 650 mg  650 mg Oral Q4H PRN    chlorhexidine (PERIDEX) 0 12 % oral rinse 15 mL  15 mL Swish & Spit Once    clotrimazole (LOTRIMIN) 1 % cream   Topical BID    furosemide (LASIX) tablet 20 mg  20 mg Oral BID    levothyroxine tablet 75 mcg  75 mcg Oral Early Morning    magnesium oxide (MAG-OX) tablet 400 mg  400 mg Oral Daily    melatonin tablet 3 mg  3 mg Oral HS    oxyCODONE (ROXICODONE) IR tablet 5 mg  5 mg Oral Q4H PRN    potassium chloride (K-DUR,KLOR-CON) CR tablet 20 mEq  20 mEq Oral Daily    rivaroxaban (XARELTO) tablet 20 mg  20 mg Oral After Dinner    sotalol (BETAPACE) tablet 120 mg  120 mg Oral Q12H [facility]       [facility] Labs/diagnostics:  CBC with diff:   Results from last 7 days   Lab Units 06/30/21  0552 06/29/21  0657   WBC Thousand/uL 5 39 4 37   HEMOGLOBIN g/dL 12 4 13 8   HEMATOCRIT % 37 5 42 6   MCV fL 102* 102*   PLATELETS Thousands/uL 168 191   MCH pg 33 8 32 9   MCHC g/dL 33 1 32 4   RDW % 13 7 13 7   MPV fL 9 9 9 3       BMP:  Results from last 7 days   Lab Units 07/01/21  0639 06/30/21  0552 06/29/21  0657   POTASSIUM mmol/L 3 8 3 6 3 4*   CHLORIDE mmol/L 105 104 102   CO2 mmol/L 30 31 30   BUN mg/dL 18 23 30*   CREATININE mg/dL 0 94 0 92 1 11   CALCIUM mg/dL 9 1 8 9 9 6       Magnesium:   Results from last 7 days   Lab Units 07/01/21  0639   MAGNESIUM mg/dL 2 2       Coags:   Results from last 7 days   Lab Units 06/29/21  0657   INR  5 00*       Complications: none    Condition at Discharge: good     Discharge instructions/Information to patient and family:   See after visit summary for information provided to patient and family  Provisions for Follow-Up Care:  See after visit summary for information related to follow-up care and any pertinent home health orders  Disposition: Home    Planned Readmission: No    Discharge Statement   I spent 45 minutes minutes discharging the patient  This time was spent on the day of discharge  I had direct contact with the patient on the day of discharge  Additional documentation is required if more than 30 minutes were spent on discharge   Evaluating the incision, discussing discharge instructions and restrictions, arranging follow up appointments, discussing medications    Discharge Medications:  See after visit summary for reconciled discharge medications provided to patient and family

## 2021-07-01 NOTE — CASE MANAGEMENT
LOS Day 2  Not a bundle  Not a readmission  Unplanned readmission risk score: 11    CM s/w patient via phone to introduce role of CM and discuss pt's needs upon discharge  No CM consult identified, however CM will continue to be available for d/c planning needs  Pt resides with wife, Apollo Leo ([phone]), in a 2L home with a bathroom on the first floor and 2 SUPRIYA  Pt reports being entirely independent PTA, denies use of rehab/HHC/SNF/DME  Pt has an unused cane in the home  Pt denies hx or tx of D&SA/MI  Pt drives self and is retired  Pt states he will drive self home upon discharge  Pt has a POA -- Youngest son, Kee Aguirre -- Document requested  Emergency contact is wife, Apollo Leo ([phone])  PCP is Daxa Fraga MD  Preferred pharmacy is [address], [address]    No CM needs identified at this time  CM will continue to follow  CM reviewed d/c planning process including the following: identifying help at home, patient preference for d/c planning needs, Discharge Lounge, [facility] Meds to Bed program, availability of treatment team to discuss questions or concerns patient and/or family may have regarding understanding medications and recognizing signs and symptoms once discharged  CM also encouraged patient to follow up with all recommended appointments after discharge  Patient advised of importance for patient and family to participate in managing patients medical well being  Patient/caregiver received discharge checklist   Content reviewed  Patient/caregiver encouraged to participate in discharge plan of care prior to discharge home

## 2021-07-02 ENCOUNTER — TRANSITIONAL CARE MANAGEMENT (OUTPATIENT)
Dept: INTERNAL MEDICINE CLINIC | Facility: CLINIC | Age: 76
End: 2021-07-02

## 2021-07-08 ENCOUNTER — CLINICAL SUPPORT (OUTPATIENT)
Dept: CARDIOLOGY CLINIC | Facility: CLINIC | Age: 76
End: 2021-07-08
Payer: MEDICARE

## 2021-07-08 VITALS
WEIGHT: 144.9 LBS | BODY MASS INDEX: 20.75 KG/M2 | DIASTOLIC BLOOD PRESSURE: 60 MMHG | HEIGHT: 70 IN | HEART RATE: 68 BPM | SYSTOLIC BLOOD PRESSURE: 110 MMHG

## 2021-07-08 DIAGNOSIS — I48.91 ATRIAL FIBRILLATION, UNSPECIFIED TYPE (HCC): Primary | ICD-10-CM

## 2021-07-08 PROCEDURE — 93000 ELECTROCARDIOGRAM COMPLETE: CPT

## 2021-07-08 NOTE — PROGRESS NOTES
Patient presents for EKG post Sotalol and pacemaker  Patient c/o SOB (unchanged) and some occasional lightheadedness  /60  HR today 68  Has 2 weeks device check 7/13/21  Device site still a little sore  On Sotalol 120mg bid  EKG reviewed by Dr Nazanin Desir  Provided Xarelto samples for patient until seen for hospital follow-up

## 2021-07-13 ENCOUNTER — IN-CLINIC DEVICE VISIT (OUTPATIENT)
Dept: CARDIOLOGY CLINIC | Facility: CLINIC | Age: 76
End: 2021-07-13

## 2021-07-13 DIAGNOSIS — Z95.0 CARDIAC PACEMAKER IN SITU: Primary | ICD-10-CM

## 2021-07-13 PROCEDURE — 99024 POSTOP FOLLOW-UP VISIT: CPT | Performed by: INTERNAL MEDICINE

## 2021-07-13 NOTE — PROGRESS NOTES
Results for orders placed or performed in visit on 07/13/21   Cardiac EP device report    Narrative    MDT DUAL PM/ ACTIVE SYSTEM IS MRI CONDITIONAL  DEVICE INTERROGATED IN THE [place] OFFICE  BATTERY VOLTAGE ADEQUATE (9 9 YRS)  AP-85%, -98% (>40% [email or web address] OFF)  ALL LEAD PARAMETERS WITHIN NORMAL LIMITS  NO SIGNIFICANT HIGH RATE EPISODES  NORMAL DEVICE FUNCTION  WOUND CHECK: INCISION CLEAN AND DRY WITH EDGES APPROXIMATED; SMALL HEMATOMA- PT STATED DECREASED SINCE DISCHARGED FROM [facility]; WOUND CARE AND RESTRICTIONS REVIEWED WITH PATIENT   GV

## 2021-07-29 ENCOUNTER — OFFICE VISIT (OUTPATIENT)
Dept: CARDIOLOGY CLINIC | Facility: CLINIC | Age: 76
End: 2021-07-29
Payer: MEDICARE

## 2021-07-29 VITALS
SYSTOLIC BLOOD PRESSURE: 120 MMHG | HEIGHT: 69 IN | WEIGHT: 144 LBS | DIASTOLIC BLOOD PRESSURE: 72 MMHG | BODY MASS INDEX: 21.33 KG/M2

## 2021-07-29 DIAGNOSIS — I48.20 CHRONIC ATRIAL FIBRILLATION (HCC): ICD-10-CM

## 2021-07-29 DIAGNOSIS — I48.91 ATRIAL FIBRILLATION, UNSPECIFIED TYPE (HCC): Primary | ICD-10-CM

## 2021-07-29 PROCEDURE — 93000 ELECTROCARDIOGRAM COMPLETE: CPT | Performed by: PHYSICIAN ASSISTANT

## 2021-07-29 PROCEDURE — 99214 OFFICE O/P EST MOD 30 MIN: CPT | Performed by: PHYSICIAN ASSISTANT

## 2021-07-29 NOTE — PROGRESS NOTES
Electrophysiology Office Note    Salvatore Barragan Sr   1945  [id number]  HEART & VASCULAR Kye Jump  [facility] [address]        Assessment/Plan     Primary diagnosis:   1   persistent atrial fibrillation, symptomatic    * patient presents to [facility] EP office for post hospital follow up, he is a patient of dr Janie Segundo    * during hospitalization patient had DC PPM implanted for symptomatic bradycardia as well as being loaded on Sotalol 120mg PO BID  * QTc today 470ms but he is V paced on ECG, device interrogated today showing no episodes of NSVT or VT  He is having orthostatic hypotension symptoms from the sotalol, see below  * he is on [facility] w/ xarelto, not having any major or minor bleeding issues but is concerned with the cost, will see if he qualifies for xarelto financial assistance    * last EF was 55% with mild LA dilation and mild MR VIA [place]   * Today we discussed non cardiac modifiable AF risk factors to prevent further substrate formation    1  HTN - controlled     2  T2DM - does not have      3  BRYNN - does not have     4  Alcohol intake- does not consume     5  Obesity - BMI is 21    6  Tobacco use - does not smoke   * given his very controlled risk factors and small amount of MR patient likely going to have little atrial fibrillation burden on his Sotalol  He was questioning today about needing sotalol for the rest of his life, discussed alternative option of atrial fibrillation ablation which I feel he would do quite well with  He is not interested in ablation at this time   * plan for 6 month follow up at the [facility] with Dr Janie Segundo     2  Symptomatic bradycardia    * s/p medtronic DC PPM by Dr Janie Segundo in June 2021   * device WNL, does have PAC's close to QRS complex which are refractory, will continue monitoring   * site well healed     3   Epigastric discomfort    * his epigastric discomfort is relieved completely with 1/2 tablet of pepcid, advised him to use this tablet once daily and f/u with his PCP  Discomfort is not cardiac related  Secondary diagnosis:   1  Non obstructive CAD s/p LHC ()  2  Hx rash on eliquis   3  HLD  4  Hypothyroidism  5  Hx renal cell carcinoma  6  Chronic HFpEF            Rhythm History:   Atrial fibrillation:   1  Diagnosed with AF    2  Evaluated by Dr Michael Dickerson - 2021 - eliquis increased to 5mg PO BID, VENKATESH/DCCV recommended   3  VENKATESH/DCCV x 3 unsuccessful to restore NSR  4  Re-evaluated by Dr Michael Dickerson, PPM recommended due to afib w/ SVR + sotalol admission - 2021  5  S/p Sotalol 120mg PO BID loading + DCCV - 2021    Atrial flutter:     SVT:     VT/VF:     Device history:   Pacemaker:  1  Symptomatic bradycardia s/p medtronic DC PPM () by Dr Michael Dickerson     Defibrillator:    BIV PPM:    BIV ICD:    ILR:      Cardiac Testing:     ECHO: Results for orders placed during the hospital encounter of 21    Echo complete with contrast if indicated    Narrative  [place] [address], [address] [place] [address]  [phone]    Transthoracic Echocardiogram  2D, M-mode, Doppler, and Color Doppler    Study date:  24-Mar-2021    Patient: Lou Pickett  MR number: [id number]  Account number: [de-identified]  : 1945  Age: 67 years  Gender: Male  Status: Outpatient  Location: [facility]  Height: 69 in  Weight: 149 6 lb  BP: 122/ 72 mmHg    Indications: Atrial fib SOB    Diagnoses: I48 0 - Atrial fibrillation    Sonographer:  RACH Wooten  Referring Physician:  Michael Norris MD  Group:  [facility]  Interpreting Physician:  Esdras Seay DO    SUMMARY    PROCEDURE INFORMATION:  This was a technically difficult study  LEFT VENTRICLE:  Systolic function was normal  Ejection fraction was estimated to be 55 %    Although no diagnostic regional wall motion abnormality was identified, this possibility cannot be completely excluded on the basis of this study  RIGHT VENTRICLE:  The ventricle was mildly to moderately dilated  Systolic function was mildly reduced  Wall thickness was increased  LEFT ATRIUM:  The atrium was dilated  RIGHT ATRIUM:  The atrium was dilated  MITRAL VALVE:  There was moderate regurgitation  The regurgitant jet was centrally directed  AORTIC VALVE:  There was mild regurgitation  TRICUSPID VALVE:  There was moderate regurgitation  Estimated peak PA pressure was 41 mmHg  The findings suggest mild pulmonary hypertension  PULMONIC VALVE:  There was mild regurgitation  IVC, HEPATIC VEINS:  The inferior vena cava was mildly dilated  Respirophasic changes were blunted (less than 50% variation)  HISTORY: PRIOR HISTORY: HLD, hypothyroid    PROCEDURE: The study was performed in the [facility]  This was a routine study  The transthoracic approach was used  The study included complete 2D imaging, M-mode, complete spectral Doppler, and color Doppler  The  heart rate was 71 bpm, at the start of the study  Images were obtained from the parasternal, apical, subcostal, and suprasternal notch acoustic windows  Echocardiographic views were limited due to poor acoustic window availability  This  was a technically difficult study  LEFT VENTRICLE: Size was normal  Systolic function was normal  Ejection fraction was estimated to be 55 %  Although no diagnostic regional wall motion abnormality was identified, this possibility cannot be completely excluded on the basis  of this study  Wall thickness was normal  DOPPLER: Normal sinus rhythm was absent  The study was not technically sufficient to allow evaluation of LV diastolic function  RIGHT VENTRICLE: The ventricle was mildly to moderately dilated  Systolic function was mildly reduced  Wall thickness was increased  LEFT ATRIUM: The atrium was dilated  RIGHT ATRIUM: The atrium was dilated      MITRAL VALVE: Valve structure was normal  There was normal leaflet separation  DOPPLER: The transmitral velocity was within the normal range  There was no evidence for stenosis  There was moderate regurgitation  The regurgitant jet was  centrally directed  AORTIC VALVE: The valve was trileaflet  Leaflets exhibited normal cuspal separation and sclerosis  DOPPLER: Transaortic velocity was within the normal range  There was no evidence for stenosis  There was mild regurgitation  TRICUSPID VALVE: The valve structure was normal  There was normal leaflet separation  DOPPLER: The transtricuspid velocity was within the normal range  There was no evidence for stenosis  There was moderate regurgitation  Pulmonary artery  systolic pressure was mildly increased  Estimated peak PA pressure was 41 mmHg  The findings suggest mild pulmonary hypertension  PULMONIC VALVE: Leaflets exhibited normal thickness, no calcification, and normal cuspal separation  DOPPLER: The transpulmonic velocity was within the normal range  There was mild regurgitation  PERICARDIUM: There was no pericardial effusion  The pericardium was normal in appearance  AORTA: The root exhibited normal size  SYSTEMIC VEINS: IVC: The inferior vena cava was mildly dilated  Respirophasic changes were blunted (less than 50% variation)      SYSTEM MEASUREMENT TABLES    2D  %FS: 32 74 %  Ao Diam: 3 49 cm  Ao asc: 3 54 cm  EDV(Teich): 101 42 ml  EF(Teich): 61 17 %  ESV(Teich): 39 38 ml  IVSd: 0 71 cm  LA Diam: 4 63 cm  LVIDd: 4 68 cm  LVIDs: 3 15 cm  LVPWd: 0 73 cm  SV(Teich): 62 04 ml    CW  AV MaxP 84 mmHg  AV Vmax: 0 68 m/s  PV Vmax: 0 75 m/s  PV maxP 25 mmHg  TR MaxP 6 mmHg  TR Vmax: 2 53 m/s    MM  TAPSE: 1 56 cm    PW  E' Sept: 0 1 m/s  E/E' Sept: 6 96  LVOT Vmax: 0 48 m/s  LVOT maxP 93 mmHg  MV Dec [place]: 5 16 m/s2  MV DecT: 135 86 ms  MV E [place]: 0 69 m/s  MV PHT: 39 4 ms  MVA By PHT: 5 58 cm2  RVOT Vmax: 0 5 m/s  RVOT maxP mmHg    [employer] Accredited Echocardiography Laboratory    Prepared and electronically signed by    Jian Perez DO  Signed 24-Mar-2021 09:01:07      CATH/STRESS TEST:   CORONARY CIRCULATION:  There was no angiographic evidence for occlusive coronary artery disease  Distal left main: There was a 10 % stenosis  Ostial LAD: There was a 50 % stenosis  Mid circumflex: There was a 40 % stenosis        EKG:   A sensed V paced, AR PAC's        History of Present Illness     HPI/INTERVAL HISTORY: Cait Powers  is a 67 y o  male with history as above who presents to B EP office today under direction of Dr Steven Wilkinson for post hospital follow up  Today patient has a few concerns  The 1st being he has struggled with chronic inferior xiphoid superior epigastric pressure that worsens throughout the day and is not associated with any form of exertion  His symptoms are relieved with half a tablet of Pepcid at nighttime but he tries to use this sparingly  He does not feel this symptom is worse post pacemaker implant or initiation of sotalol  His 2nd concern is with lightheadedness/dizziness  He feels he chronically has struggled with symptoms of lightheadedness when bending over and standing upright but since being on sotalol when going from a seated to standing position he has felt more lightheaded than usual need to take a 2nd before he starts walking  He does feel he remains hydrated throughout the day but feels he could drink more water  His 3rd concern is if he is going to need to take sotalol for the rest of his life  He does not have any issue with this he is just questioning the duration of his medications  His Xarelto is about 200 dollars for a 90 day supply through his mail order pharmacy which is quite unaffordable for him  Review of Systems  ROS as noted above, otherwise 12 point review of systems was performed and is negative         Historical Information   Social History     Socioeconomic History    Marital status: /Civil Union     Spouse name: Not on file    Number of children: Not on file    Years of education: Not on file    Highest education level: Not on file   Occupational History    Not on file   Tobacco Use    Smoking status: Former Smoker    Smokeless tobacco: Never Used    Tobacco comment: quit at age 23   Vaping Use    Vaping Use: Former   Substance and Sexual Activity    Alcohol use: Not Currently    Drug use: No    Sexual activity: Not on file   Other Topics Concern    Not on file   Social History Narrative    Not on file     Social Determinants of Health     Financial Resource Strain:     Difficulty of Paying Living Expenses:    Food Insecurity:     Worried About [address] in the Last Year:     [address] in the Last Year:    Transportation Needs:     Lack of Transportation (Medical):      Lack of Transportation (Non-Medical):    Physical Activity:     Days of Exercise per Week:     Minutes of Exercise per Session:    Stress:     Feeling of Stress :    Social Connections:     Frequency of Communication with Friends and Family:     Frequency of Social Gatherings with Friends and Family:     Attends [employer] Services:     Active Member of Clubs or Organizations:     Attends Club or Organization Meetings:     Marital Status:    Intimate Partner Violence:     Fear of Current or Ex-Partner:     Emotionally Abused:     Physically Abused:     Sexually Abused:      Past Medical History:   Diagnosis Date    Dyslipidemia     Erectile dysfunction of non-organic origin     Gastroesophageal reflux     Hyperlipidemia     Hypothyroidism     Liver disease     Malignant neoplasm of kidney [facility] )     Left    Malignant neoplasm of left kidney, except renal pelvis ([facility] [address] )     Pure hypercholesterolemia     Thyroid disease     Vitamin D deficiency, unspecified      Past Surgical History:   Procedure Laterality Date    CATARACT EXTRACTION Bilateral 2014    CYSTOSCOPY      EGD AND COLONOSCOPY  12/15/2009    HERNIA REPAIR Left     Inguinal    URETHRA SURGERY  05/2010    Done by Dr Nichelle Craig History     Substance and Sexual Activity   Alcohol Use Not Currently     Social History     Substance and Sexual Activity   Drug Use No     Social History     Tobacco Use   Smoking Status Former Smoker   Smokeless Tobacco Never Used   Tobacco Comment    quit at age 23     Family History   Problem Relation Age of Onset   [facility] Cancer Mother         bladder    Heart disease Mother     Prostate cancer Father     Stroke Father     Heart disease Father     Hyperlipidemia Family        Meds/Allergies       Current Outpatient Medications:     Ascorbic Acid (vitamin C) 1000 MG tablet, Take 1,000 mg by mouth daily, Disp: , Rfl:     cholecalciferol (VITAMIN D3) 1,000 units tablet, Take 1,000 Units by mouth daily, Disp: , Rfl:     Coenzyme Q10 (CoQ10) 100 MG CAPS, Take by mouth, Disp: , Rfl:     furosemide (LASIX) 20 mg tablet, Take 1 tablet (20 mg total) by mouth daily as needed (for edema/weight gain), Disp: 90 tablet, Rfl: 0    ketoconazole (NIZORAL) 2 % cream, APPLY TO AFFECTED AREA(S) ON LEFT HAND AND FEET TWO TIMES DAILY, Disp: , Rfl:     levothyroxine 75 mcg tablet, Take 1 tablet (75 mcg total) by mouth daily in the early morning for 14 days, Disp: 14 tablet, Rfl: 0    magnesium oxide (MAG-OX) 400 mg, Take 400 mg by mouth daily, Disp: , Rfl:     melatonin 3 mg, Take by mouth daily at bedtime, Disp: , Rfl:     Multiple Vitamins-Minerals (MULTIVITAMIN ADULT PO), Take by mouth every other day , Disp: , Rfl:     potassium chloride (MICRO-K) 10 MEQ CR capsule, Take 2 capsules (20 mEq total) by mouth daily, Disp: 180 capsule, Rfl: 3    Probiotic Product (CULTURELLE PROBIOTICS PO), Take by mouth, Disp: , Rfl:     rivaroxaban (XARELTO) 20 mg tablet, Take 1 tablet (20 mg total) by mouth daily after dinner LOT [id number]  EXP 10/22, Disp: 90 tablet, Rfl: 3    sotalol (BETAPACE) 120 mg tablet, Take 1 tablet (120 mg total) by mouth every 12 (twelve) hours, Disp: 60 tablet, Rfl: 3    vitamin B-12 (VITAMIN B-12) 1,000 mcg tablet, Take by mouth daily, Disp: , Rfl:     Allergies   Allergen Reactions    Atorvastatin      Muscle pain    Crestor [Rosuvastatin] Dizziness    Vytorin [Ezetimibe-Simvastatin] Dizziness       Objective   Vitals: Blood pressure 120/72, height 5' 9" (1 753 m), weight 65 3 kg (144 lb)  Physical Exam  Constitutional:       Appearance: He is well-developed  HENT:      Head: Normocephalic and atraumatic  Eyes:      Pupils: Pupils are equal, round, and reactive to light  Cardiovascular:      Rate and Rhythm: Normal rate and regular rhythm  Pulmonary:      Effort: Pulmonary effort is normal       Breath sounds: Normal breath sounds  Abdominal:      General: Bowel sounds are normal       Palpations: Abdomen is soft  Musculoskeletal:         General: Normal range of motion  Cervical back: Normal range of motion and neck supple  Skin:     General: Skin is warm and dry  Neurological:      Mental Status: He is alert and oriented to person, place, and time  Labs:not applicable    Imaging: I have personally reviewed pertinent reports

## 2021-07-30 DIAGNOSIS — I48.20 CHRONIC ATRIAL FIBRILLATION (HCC): ICD-10-CM

## 2021-07-30 NOTE — TELEPHONE ENCOUNTER
Left message on patient's answering machine to return my call to discuss patient assistance for xarelto

## 2021-08-05 DIAGNOSIS — I48.20 CHRONIC ATRIAL FIBRILLATION (HCC): ICD-10-CM

## 2021-08-05 NOTE — TELEPHONE ENCOUNTER
Samples and jonh salguero patient assistance form given to patient to finish fillining out and mail back to [employer]

## 2021-08-26 NOTE — PROGRESS NOTES
Assessment and Plan:     Problem List Items Addressed This Visit        Digestive    Gastroesophageal reflux       Endocrine    Hypothyroidism       Genitourinary    Malignant neoplasm of left kidney, except renal pelvis (HCC)       Other    Hyperlipidemia    Shortness of breath - Primary    Medicare annual wellness visit, subsequent           Preventive health issues were discussed with patient, and age appropriate screening tests were ordered as noted in patient's After Visit Summary  Personalized health advice and appropriate referrals for health education or preventive services given if needed, as noted in patient's After Visit Summary       History of Present Illness:     Patient presents for Medicare Annual Wellness visit    Patient Care Team:  Hudson Sutton MD as PCP - General (Internal Medicine)  Jovanna Teran MD     Problem List:     Patient Active Problem List   Diagnosis    Renal cell adenocarcinoma, left ([address] )    Vitamin D deficiency, unspecified    Pure hypercholesterolemia    Malignant neoplasm of left kidney, except renal pelvis ([address] )    Hyperlipidemia    Hypothyroidism    Gastroesophageal reflux    Shortness of breath    Medicare annual wellness visit, subsequent      Past Medical and Surgical History:     Past Medical History:   Diagnosis Date    Dyslipidemia     Erectile dysfunction of non-organic origin     Gastroesophageal reflux     Hyperlipidemia     Hypothyroidism     Liver disease     Malignant neoplasm of kidney ([address] )     Left    Malignant neoplasm of left kidney, except renal pelvis ([address] )     Pure hypercholesterolemia     Thyroid disease     Vitamin D deficiency, unspecified      Past Surgical History:   Procedure Laterality Date    CYSTOSCOPY      EGD AND COLONOSCOPY  12/15/2009    HERNIA REPAIR Left     Inguinal    URETHRA SURGERY  05/2010    Done by Dr Stephan Vogel       Family History:     Family History   Problem Relation Age of Onset    Cancer Mother bladder    Heart disease Mother     Prostate cancer Father     Stroke Father     Heart disease Father     Hyperlipidemia Family       Social History:        Social History     Socioeconomic History    Marital status: /Civil Union     Spouse name: None    Number of children: None    Years of education: None    Highest education level: None   Occupational History    None   Social Needs    Financial resource strain: None    Food insecurity     Worry: None     Inability: None    Transportation needs     Medical: None     Non-medical: None   Tobacco Use    Smoking status: Former Smoker    Smokeless tobacco: Never Used    Tobacco comment: quit at age 23   Substance and Sexual Activity    Alcohol use: Not Currently     Comment: rarely    Drug use: No    Sexual activity: None   Lifestyle    Physical activity     Days per week: None     Minutes per session: None    Stress: None   Relationships    Social connections     Talks on phone: None     Gets together: None     Attends Faith service: None     Active member of club or organization: None     Attends meetings of clubs or organizations: None     Relationship status: None    Intimate partner violence     Fear of current or ex partner: None     Emotionally abused: None     Physically abused: None     Forced sexual activity: None   Other Topics Concern    None   Social History Narrative    None      Medications and Allergies:     Current Outpatient Medications   Medication Sig Dispense Refill    Ascorbic Acid (vitamin C) 1000 MG tablet Take 1,000 mg by mouth daily      Ergocalciferol (Vitamin D2) 50 MCG (2000 UT) TABS Take by mouth daily      levothyroxine 75 mcg tablet Take 1 tablet (75 mcg total) by mouth daily in the early morning 90 tablet 0    Multiple Vitamins-Minerals (MULTIVITAMIN ADULT PO) Take by mouth daily      aspirin 81 mg chewable tablet Chew 81 mg daily       No current facility-administered medications for this visit  Allergies   Allergen Reactions    Asa-Apap-Salicyl-Caff     Atorvastatin      Muscle pain    Crestor [Rosuvastatin] Dizziness    Vytorin [Ezetimibe-Simvastatin] Dizziness      Immunizations:     Immunization History   Administered Date(s) Administered    INFLUENZA 09/22/2020    influenza, trivalent, adjuvanted 09/22/2020      Health Maintenance: There are no preventive care reminders to display for this patient  Topic Date Due    COVID-19 Vaccine (1) Never done    DTaP,Tdap,and Td Vaccines (1 - Tdap) Never done    Pneumococcal Vaccine: 65+ Years (1 of 1 - PPSV23) Never done      Medicare Health Risk Assessment:     /72 (BP Location: Left arm, Patient Position: Sitting, Cuff Size: Standard)   Pulse 65   Temp 97 8 °F (36 6 °C) (Temporal)   Ht 5' 9" (1 753 m)   Wt 68 kg (150 lb)   SpO2 97%   BMI 22 15 kg/m²      Hedy Polanco is here for his Subsequent Wellness visit  Last Medicare Wellness visit information reviewed, patient interviewed and updates made to the record today  Health Risk Assessment:   Patient rates overall health as good  Patient feels that their physical health rating is slightly worse  Patient is very satisfied with their life  Eyesight was rated as same  Hearing was rated as slightly worse  Patient feels that their emotional and mental health rating is same  Patients states they are sometimes angry  Patient states they are often unusually tired/fatigued  Pain experienced in the last 7 days has been some  Patient's pain rating has been 8/10  Patient states that he has experienced no weight loss or gain in last 6 months  Depression Screening:   PHQ-2 Score: 0      Fall Risk Screening: In the past year, patient has experienced: no history of falling in past year      Home Safety:  Patient does not have trouble with stairs inside or outside of their home  Patient has working smoke alarms and has no working carbon monoxide detector  Home safety hazards include: none  Nutrition:   Current diet is Regular  Medications:   Patient is currently taking over-the-counter supplements  OTC medications include: see medication list  Patient is able to manage medications  Activities of Daily Living (ADLs)/Instrumental Activities of Daily Living (IADLs):   Walk and transfer into and out of bed and chair?: Yes  Dress and groom yourself?: Yes    Bathe or shower yourself?: Yes    Feed yourself? Yes  Do your laundry/housekeeping?: Yes  Manage your money, pay your bills and track your expenses?: Yes  Make your own meals?: Yes    Do your own shopping?: Yes    Previous Hospitalizations:   Any hospitalizations or ED visits within the last 12 months?: No      Advance Care Planning:   Living will: No    Five wishes given: Yes      PREVENTIVE SCREENINGS      Cardiovascular Screening:    General: Screening Not Indicated and History Lipid Disorder      Diabetes Screening:     General: Screening Current      Prostate Cancer Screening:    General: Screening Not Indicated      Abdominal Aortic Aneurysm (AAA) Screening:    Risk factors include: tobacco use        Lung Cancer Screening:     General: Screening Not Indicated    Screening, Brief Intervention, and Referral to Treatment (SBIRT)    Screening  Typical number of drinks in a day: 0  Typical number of drinks in a week: 0  Interpretation: Low risk drinking behavior      Single Item Drug Screening:  How often have you used an illegal drug (including marijuana) or a prescription medication for non-medical reasons in the past year? never    Single Item Drug Screen Score: 0  Interpretation: Negative screen for possible drug use disorder      Ramya Chauhan MD [Initial Visit ___] : [unfilled] is here today for an initial visit  for [unfilled]

## 2021-09-02 DIAGNOSIS — I48.20 CHRONIC ATRIAL FIBRILLATION (HCC): ICD-10-CM

## 2021-10-15 ENCOUNTER — TELEPHONE (OUTPATIENT)
Dept: CARDIOLOGY CLINIC | Facility: CLINIC | Age: 76
End: 2021-10-15

## 2021-10-18 ENCOUNTER — IN-CLINIC DEVICE VISIT (OUTPATIENT)
Dept: CARDIOLOGY CLINIC | Facility: CLINIC | Age: 76
End: 2021-10-18
Payer: MEDICARE

## 2021-10-18 DIAGNOSIS — Z95.0 PRESENCE OF CARDIAC PACEMAKER: Primary | ICD-10-CM

## 2021-10-18 PROCEDURE — 93280 PM DEVICE PROGR EVAL DUAL: CPT | Performed by: INTERNAL MEDICINE

## 2021-10-25 DIAGNOSIS — I48.19 PERSISTENT ATRIAL FIBRILLATION (HCC): ICD-10-CM

## 2021-10-25 RX ORDER — SOTALOL HYDROCHLORIDE 120 MG/1
120 TABLET ORAL EVERY 12 HOURS SCHEDULED
Qty: 180 TABLET | Refills: 3 | Status: SHIPPED | OUTPATIENT
Start: 2021-10-25 | End: 2022-04-06

## 2021-12-01 ENCOUNTER — OFFICE VISIT (OUTPATIENT)
Dept: FAMILY MEDICINE CLINIC | Facility: CLINIC | Age: 76
End: 2021-12-01
Payer: MEDICARE

## 2021-12-01 VITALS
HEIGHT: 69 IN | WEIGHT: 150 LBS | BODY MASS INDEX: 22.22 KG/M2 | DIASTOLIC BLOOD PRESSURE: 76 MMHG | RESPIRATION RATE: 16 BRPM | OXYGEN SATURATION: 98 % | SYSTOLIC BLOOD PRESSURE: 122 MMHG | HEART RATE: 83 BPM

## 2021-12-01 DIAGNOSIS — Z23 ENCOUNTER FOR IMMUNIZATION: ICD-10-CM

## 2021-12-01 DIAGNOSIS — I48.19 PERSISTENT ATRIAL FIBRILLATION (HCC): ICD-10-CM

## 2021-12-01 DIAGNOSIS — E03.9 ACQUIRED HYPOTHYROIDISM: ICD-10-CM

## 2021-12-01 DIAGNOSIS — E78.2 MIXED HYPERLIPIDEMIA: ICD-10-CM

## 2021-12-01 DIAGNOSIS — C64.2 MALIGNANT NEOPLASM OF LEFT KIDNEY, EXCEPT RENAL PELVIS (HCC): Primary | ICD-10-CM

## 2021-12-01 DIAGNOSIS — E55.9 VITAMIN D DEFICIENCY: ICD-10-CM

## 2021-12-01 DIAGNOSIS — Z11.59 NEED FOR HEPATITIS C SCREENING TEST: ICD-10-CM

## 2021-12-01 PROCEDURE — 90732 PPSV23 VACC 2 YRS+ SUBQ/IM: CPT | Performed by: FAMILY MEDICINE

## 2021-12-01 PROCEDURE — G0009 ADMIN PNEUMOCOCCAL VACCINE: HCPCS | Performed by: FAMILY MEDICINE

## 2021-12-01 PROCEDURE — 99214 OFFICE O/P EST MOD 30 MIN: CPT | Performed by: FAMILY MEDICINE

## 2021-12-01 RX ORDER — VITAMIN B COMPLEX
200 TABLET ORAL DAILY
Qty: 60 CAPSULE | Refills: 0 | Status: SHIPPED | OUTPATIENT
Start: 2021-12-01

## 2021-12-01 RX ORDER — ROSUVASTATIN CALCIUM 20 MG/1
20 TABLET, COATED ORAL
Qty: 10 TABLET | Refills: 0 | Status: SHIPPED | OUTPATIENT
Start: 2021-12-01 | End: 2022-04-06

## 2021-12-06 ENCOUNTER — HOSPITAL ENCOUNTER (OUTPATIENT)
Dept: RADIOLOGY | Facility: MEDICAL CENTER | Age: 76
Discharge: HOME | End: 2021-12-06
Payer: MEDICARE

## 2021-12-06 DIAGNOSIS — C64.2 MALIGNANT NEOPLASM OF LEFT KIDNEY, EXCEPT RENAL PELVIS (HCC): ICD-10-CM

## 2021-12-06 PROCEDURE — 76770 US EXAM ABDO BACK WALL COMP: CPT

## 2021-12-15 ENCOUNTER — OFFICE VISIT (OUTPATIENT)
Dept: UROLOGY | Facility: AMBULATORY SURGERY CENTER | Age: 76
End: 2021-12-15
Payer: MEDICARE

## 2021-12-15 VITALS
DIASTOLIC BLOOD PRESSURE: 82 MMHG | HEART RATE: 68 BPM | WEIGHT: 150 LBS | BODY MASS INDEX: 22.22 KG/M2 | SYSTOLIC BLOOD PRESSURE: 120 MMHG | HEIGHT: 69 IN

## 2021-12-15 DIAGNOSIS — C64.2 MALIGNANT NEOPLASM OF LEFT KIDNEY, EXCEPT RENAL PELVIS (HCC): Primary | ICD-10-CM

## 2021-12-15 PROCEDURE — 99214 OFFICE O/P EST MOD 30 MIN: CPT | Performed by: NURSE PRACTITIONER

## 2022-01-12 ENCOUNTER — APPOINTMENT (OUTPATIENT)
Dept: LAB | Facility: AMBULARY SURGERY CENTER | Age: 77
End: 2022-01-12
Payer: MEDICARE

## 2022-01-12 DIAGNOSIS — E03.9 ACQUIRED HYPOTHYROIDISM: ICD-10-CM

## 2022-01-12 DIAGNOSIS — I48.19 PERSISTENT ATRIAL FIBRILLATION (HCC): ICD-10-CM

## 2022-01-12 DIAGNOSIS — E78.2 MIXED HYPERLIPIDEMIA: ICD-10-CM

## 2022-01-12 DIAGNOSIS — Z11.59 NEED FOR HEPATITIS C SCREENING TEST: ICD-10-CM

## 2022-01-12 DIAGNOSIS — E55.9 VITAMIN D DEFICIENCY: ICD-10-CM

## 2022-01-12 DIAGNOSIS — C64.2 MALIGNANT NEOPLASM OF LEFT KIDNEY, EXCEPT RENAL PELVIS (HCC): ICD-10-CM

## 2022-01-12 LAB
25(OH)D3 SERPL-MCNC: 50 NG/ML (ref 30–100)
ANION GAP SERPL CALCULATED.3IONS-SCNC: 2 MMOL/L (ref 4–13)
BASOPHILS # BLD AUTO: 0.02 THOUSANDS/ΜL (ref 0–0.1)
BASOPHILS NFR BLD AUTO: 0 % (ref 0–1)
BUN SERPL-MCNC: 28 MG/DL (ref 5–25)
CALCIUM SERPL-MCNC: 10 MG/DL (ref 8.3–10.1)
CHLORIDE SERPL-SCNC: 101 MMOL/L (ref 100–108)
CHOLEST SERPL-MCNC: 300 MG/DL
CO2 SERPL-SCNC: 32 MMOL/L (ref 21–32)
CREAT SERPL-MCNC: 1.02 MG/DL (ref 0.6–1.3)
EOSINOPHIL # BLD AUTO: 0.09 THOUSAND/ΜL (ref 0–0.61)
EOSINOPHIL NFR BLD AUTO: 2 % (ref 0–6)
ERYTHROCYTE [DISTWIDTH] IN BLOOD BY AUTOMATED COUNT: 12.9 % (ref 11.6–15.1)
GFR SERPL CREATININE-BSD FRML MDRD: 70 ML/MIN/1.73SQ M
GLUCOSE P FAST SERPL-MCNC: 88 MG/DL (ref 65–99)
HCT VFR BLD AUTO: 40.9 % (ref 36.5–49.3)
HCV AB SER QL: NORMAL
HDLC SERPL-MCNC: 55 MG/DL
HGB BLD-MCNC: 13.7 G/DL (ref 12–17)
IMM GRANULOCYTES # BLD AUTO: 0.01 THOUSAND/UL (ref 0–0.2)
IMM GRANULOCYTES NFR BLD AUTO: 0 % (ref 0–2)
LDLC SERPL CALC-MCNC: 227 MG/DL (ref 0–100)
LYMPHOCYTES # BLD AUTO: 1.35 THOUSANDS/ΜL (ref 0.6–4.47)
LYMPHOCYTES NFR BLD AUTO: 25 % (ref 14–44)
MCH RBC QN AUTO: 33.4 PG (ref 26.8–34.3)
MCHC RBC AUTO-ENTMCNC: 33.5 G/DL (ref 31.4–37.4)
MCV RBC AUTO: 100 FL (ref 82–98)
MONOCYTES # BLD AUTO: 0.68 THOUSAND/ΜL (ref 0.17–1.22)
MONOCYTES NFR BLD AUTO: 12 % (ref 4–12)
NEUTROPHILS # BLD AUTO: 3.34 THOUSANDS/ΜL (ref 1.85–7.62)
NEUTS SEG NFR BLD AUTO: 61 % (ref 43–75)
NRBC BLD AUTO-RTO: 0 /100 WBCS
PLATELET # BLD AUTO: 202 THOUSANDS/UL (ref 149–390)
PMV BLD AUTO: 9.3 FL (ref 8.9–12.7)
POTASSIUM SERPL-SCNC: 4.5 MMOL/L (ref 3.5–5.3)
RBC # BLD AUTO: 4.1 MILLION/UL (ref 3.88–5.62)
SODIUM SERPL-SCNC: 135 MMOL/L (ref 136–145)
TRIGL SERPL-MCNC: 89 MG/DL
TSH SERPL DL<=0.05 MIU/L-ACNC: 1.04 UIU/ML (ref 0.36–3.74)
WBC # BLD AUTO: 5.49 THOUSAND/UL (ref 4.31–10.16)

## 2022-01-12 PROCEDURE — 85025 COMPLETE CBC W/AUTO DIFF WBC: CPT

## 2022-01-12 PROCEDURE — 80048 BASIC METABOLIC PNL TOTAL CA: CPT

## 2022-01-12 PROCEDURE — 86803 HEPATITIS C AB TEST: CPT

## 2022-01-12 PROCEDURE — 80061 LIPID PANEL: CPT

## 2022-01-12 PROCEDURE — 82306 VITAMIN D 25 HYDROXY: CPT

## 2022-01-12 PROCEDURE — 36415 COLL VENOUS BLD VENIPUNCTURE: CPT

## 2022-01-12 PROCEDURE — 84443 ASSAY THYROID STIM HORMONE: CPT

## 2022-01-18 ENCOUNTER — REMOTE DEVICE CLINIC VISIT (OUTPATIENT)
Dept: CARDIOLOGY CLINIC | Facility: CLINIC | Age: 77
End: 2022-01-18
Payer: MEDICARE

## 2022-01-18 DIAGNOSIS — Z95.0 PRESENCE OF PERMANENT CARDIAC PACEMAKER: Primary | ICD-10-CM

## 2022-01-18 PROCEDURE — 93296 REM INTERROG EVL PM/IDS: CPT | Performed by: INTERNAL MEDICINE

## 2022-01-18 PROCEDURE — 93294 REM INTERROG EVL PM/LDLS PM: CPT | Performed by: INTERNAL MEDICINE

## 2022-01-18 NOTE — PROGRESS NOTES
MDT DUAL PM/ ACTIVE SYSTEM IS MRI CONDITIONAL   CARELINK TRANSMISSION:  BATTERY VOLTAGE ADEQUATE (11 4 YR)    AP 73 6%  98 7% (>40%/DDDR 60 PPM,150-180 MS, HIS)   ALL LEAD PARAMETERS WITHIN NORMAL LIMITS   1 VT EPISODE WITH EGM SHOWING NSVT (9 @ 174 BPM)   EF 55% (VENKATESH 06/2021)   PT TAKES SOTALOL AND XARELTO   NORMAL DEVICE FUNCTION   RG

## 2022-03-29 ENCOUNTER — TELEPHONE (OUTPATIENT)
Dept: INTERNAL MEDICINE CLINIC | Facility: CLINIC | Age: 77
End: 2022-03-29

## 2022-03-29 DIAGNOSIS — E03.9 ACQUIRED HYPOTHYROIDISM: ICD-10-CM

## 2022-03-29 RX ORDER — LEVOTHYROXINE SODIUM 0.07 MG/1
75 TABLET ORAL
Qty: 90 TABLET | Refills: 1 | Status: SHIPPED | OUTPATIENT
Start: 2022-03-29

## 2022-04-06 ENCOUNTER — OFFICE VISIT (OUTPATIENT)
Dept: CARDIOLOGY CLINIC | Facility: CLINIC | Age: 77
End: 2022-04-06
Payer: MEDICARE

## 2022-04-06 VITALS
BODY MASS INDEX: 22.1 KG/M2 | OXYGEN SATURATION: 99 % | HEART RATE: 71 BPM | SYSTOLIC BLOOD PRESSURE: 106 MMHG | HEIGHT: 69 IN | WEIGHT: 149.2 LBS | DIASTOLIC BLOOD PRESSURE: 60 MMHG

## 2022-04-06 DIAGNOSIS — E78.5 DYSLIPIDEMIA: ICD-10-CM

## 2022-04-06 DIAGNOSIS — I25.10 ATHEROSCLEROSIS OF CORONARY ARTERY OF NATIVE HEART WITHOUT ANGINA PECTORIS, UNSPECIFIED VESSEL OR LESION TYPE: ICD-10-CM

## 2022-04-06 DIAGNOSIS — I50.32 CHRONIC DIASTOLIC (CONGESTIVE) HEART FAILURE (HCC): ICD-10-CM

## 2022-04-06 DIAGNOSIS — I48.19 PERSISTENT ATRIAL FIBRILLATION (HCC): Primary | ICD-10-CM

## 2022-04-06 PROCEDURE — 93000 ELECTROCARDIOGRAM COMPLETE: CPT | Performed by: INTERNAL MEDICINE

## 2022-04-06 PROCEDURE — 99214 OFFICE O/P EST MOD 30 MIN: CPT | Performed by: INTERNAL MEDICINE

## 2022-04-06 PROCEDURE — 1124F ACP DISCUSS-NO DSCNMKR DOCD: CPT | Performed by: INTERNAL MEDICINE

## 2022-04-06 RX ORDER — SOTALOL HYDROCHLORIDE 80 MG/1
80 TABLET ORAL EVERY 12 HOURS SCHEDULED
Qty: 90 TABLET | Refills: 3 | Status: SHIPPED | OUTPATIENT
Start: 2022-04-06

## 2022-04-06 RX ORDER — PRAVASTATIN SODIUM 10 MG
10 TABLET ORAL DAILY
Qty: 30 TABLET | Refills: 3 | Status: SHIPPED | OUTPATIENT
Start: 2022-04-06

## 2022-04-06 NOTE — PROGRESS NOTES
HEART AND VASCULAR  CARDIAC [address]  [address] [facility]    Outpatient   Follow-up  Today's Date: 04/06/22        Patient name: Chiquita Vasquez Sr  YOB: 1945  Sex: male         Chief Complaint: f/u afib/pacemaker      ASSESSMENT:  Problem List Items Addressed This Visit        Cardiovascular and Mediastinum    Persistent atrial fibrillation ([address] ) - Primary    Relevant Medications    sotalol (BETAPACE) 80 mg tablet    Other Relevant Orders    POCT ECG    Chronic diastolic (congestive) heart failure (HCC)    Relevant Medications    sotalol (BETAPACE) 80 mg tablet      Other Visit Diagnoses     Dyslipidemia        Relevant Medications    pravastatin (PRAVACHOL) 10 mg tablet    Atherosclerosis of coronary artery of native heart without angina pectoris, unspecified vessel or lesion type        Relevant Medications    sotalol (BETAPACE) 80 mg tablet        64 yo  1) h/o persistent afib, in NSR since DCCV and on sotalol  2) Dual chamber ppm AV payal disease  Slow afib  100% RV paced w LPFB lead  3) Dyslipidemia, LDL >300 and intolerant to statins  4) Atheroscloerosis/CAD 50% Mid LAD            PLAN:  1> Decrease sotalol 80mg bid see if less fatigue  2  Try pravastatin 10mg for lipids, he was on crestor 20mg before which is high dose (was taking every 3 days)            Orders Placed This Encounter   Procedures    POCT ECG     Medications Discontinued During This Encounter   Medication Reason    sotalol (BETAPACE) 120 mg tablet     rosuvastatin (CRESTOR) 20 MG tablet              HPI/Subjective:     64 yo  1) h/o persistent afib, in NSR since DCCV and on sotalol  2) Dual chamber ppm AV payal disease  Slow afib  100% RV paced w LPFB lead  3) Dyslipidemia, LDL >300 and intolerant to statins     4) Atheroscloerosis/CAD 50% Mid LAD      Flo Koyanagi is doing ok, had some atypical CP sounds musculo skeletal putting mild away, sharp pain in left chest then ache for 3-4 days  Gone now  Also notes some days fatigued  No edema, not taking lasix  Please note HPI is listed by problem with with update following it, it is copied again in the assessment above and reflects medical decision making as well  Complete 12 point ROS reviewed and otherwise non pertinent or negative except as per HPI pertinent positives in Cardiovascular and Respiratory emphasized  Please see paper chart for outpatient clinic patients where the patient completed the 12 point ROS survey  Past Medical History:   Diagnosis Date    Dyslipidemia     Erectile dysfunction of non-organic origin     Gastroesophageal reflux     Hyperlipidemia     Hypothyroidism     Liver disease     Malignant neoplasm of kidney (HCC)     Left    Malignant neoplasm of left kidney, except renal pelvis (HCC)     Pure hypercholesterolemia     Thyroid disease     Vitamin D deficiency, unspecified        Allergies   Allergen Reactions    Atorvastatin      Muscle pain    Crestor [Rosuvastatin] Dizziness    Vytorin [Ezetimibe-Simvastatin] Dizziness     I reviewed the Home Medication list and Allergies in the chart     Scheduled Meds:  Current Outpatient Medications   Medication Sig Dispense Refill    Ascorbic Acid (vitamin C) 1000 MG tablet Take 1,000 mg by mouth daily      cholecalciferol (VITAMIN D3) 1,000 units tablet Take 1,000 Units by mouth daily      Coenzyme Q10 (CoQ10) 100 MG CAPS Take 2 capsules (200 mg total) by mouth daily 60 capsule 0    ketoconazole (NIZORAL) 2 % cream APPLY TO AFFECTED AREA(S) ON LEFT HAND AND FEET TWO TIMES DAILY      levothyroxine 75 mcg tablet Take 1 tablet (75 mcg total) by mouth daily in the early morning 90 tablet 1    magnesium oxide (MAG-OX) 400 mg Take 400 mg by mouth daily      melatonin 3 mg Take by mouth daily at bedtime      Multiple Vitamins-Minerals (MULTIVITAMIN ADULT PO) Take by mouth every other day       potassium chloride (MICRO-K) 10 MEQ CR capsule Take 2 capsules (20 mEq total) by mouth daily 180 capsule 3    Probiotic Product (CULTURELLE PROBIOTICS PO) Take by mouth      rivaroxaban (XARELTO) 20 mg tablet Take 1 tablet (20 mg total) by mouth daily after dinner 90 tablet 3    sotalol (BETAPACE) 80 mg tablet Take 1 tablet (80 mg total) by mouth every 12 (twelve) hours 90 tablet 3    vitamin B-12 (VITAMIN B-12) 1,000 mcg tablet Take by mouth daily      furosemide (LASIX) 20 mg tablet Take 1 tablet (20 mg total) by mouth daily as needed (for edema/weight gain) (Patient not taking: Reported on 12/1/2021 ) 90 tablet 0    pravastatin (PRAVACHOL) 10 mg tablet Take 1 tablet (10 mg total) by mouth daily 30 tablet 3     No current facility-administered medications for this visit       PRN Meds:         Family History   Problem Relation Age of Onset   Katherin Sang Cancer Mother         bladder    Heart disease Mother     Prostate cancer Father     Stroke Father     Heart disease Father     Hyperlipidemia Family        Social History     Socioeconomic History    Marital status: /Civil Union     Spouse name: Not on file    Number of children: Not on file    Years of education: Not on file    Highest education level: Not on file   Occupational History    Not on file   Tobacco Use    Smoking status: Former Smoker    Smokeless tobacco: Never Used    Tobacco comment: quit at age 23   Vaping Use    Vaping Use: Former   Substance and Sexual Activity    Alcohol use: Not Currently    Drug use: No    Sexual activity: Not on file   Other Topics Concern    Not on file   Social History Narrative    Not on file     Social Determinants of Health     Financial Resource Strain: Not on file   Food Insecurity: Not on file   Transportation Needs: Not on file   Physical Activity: Not on file   Stress: Not on file   Social Connections: Not on file   Intimate Partner Violence: Not on file   Housing Stability: Not on file         OBJECTIVE:    /60   Pulse 71   Ht 5' 9" (1 753 m)   Wt 67 7 kg (149 lb 3 2 oz)   SpO2 99%   BMI 22 03 kg/m²   Vitals:    04/06/22 0917   Weight: 67 7 kg (149 lb 3 2 oz)     GEN: No acute distress, Alert and oriented, well appearing  HEENT:External ears normal, oral pharynx clear, mucous membranes moist  EYES: Pupils equal, sclera anicteric, midline, normal conjuctiva  NECK: No JVD, supple, no obvious masses or thryomegaly or goiter  CARDIOVASCULAR:  RRR, No murmur, rub, gallops S1,S2  LUNGS: Clear To auscultation bilaterally, normal effort, no rales, rhonchi, crackles   ABDOMEN:  nondistended,  without obvious organomegaly or ascites  EXTREMITIES/VASCULAR:  No edema  warm an well perfused  PSYCH: Normal Affect,  linear speech pattern without evidence of psychosis  NEURO: Grossly intact, moving all extremiteis equal, face symmetric, alert and responsive, no obvious focal defecits   GAIT: Ambulates normally without difficulty  HEME: No bleeding, bruising, petechia, purpura   SKIN: No significant rashes on visibile skin, warm, no diaphoresis or pallor  Lab Results:       LABS:      Chemistry        Component Value Date/Time    K 4 5 01/12/2022 1001     01/12/2022 1001    CO2 32 01/12/2022 1001    BUN 28 (H) 01/12/2022 1001    CREATININE 1 02 01/12/2022 1001        Component Value Date/Time    CALCIUM 10 0 01/12/2022 1001    ALKPHOS 63 06/10/2021 1114    AST 25 06/10/2021 1114    ALT 53 06/10/2021 1114            No results found for: CHOL  Lab Results   Component Value Date    HDL 55 01/12/2022    HDL 71 05/21/2021     Lab Results   Component Value Date    LDLCALC 227 (H) 01/12/2022    LDLCALC 189 (H) 05/21/2021     Lab Results   Component Value Date    TRIG 89 01/12/2022    TRIG 59 05/21/2021     No results found for: CHOLHDL    IMAGING: No results found       Cardiac testing:   Results for orders placed during the hospital encounter of 21    Echo complete with contrast if indicated    Narrative  [facility], [address]  [phone]    Transthoracic Echocardiogram  2D, M-mode, Doppler, and Color Doppler    Study date:  24-Mar-2021    Patient: Bisi Teran  MR number: [id number]  Account number: [de-identified]  : 1945  Age: 67 years  Gender: Male  Status: Outpatient  Location: [address] [place] [address]  Height: 69 in  Weight: 149 6 lb  BP: 122/ 72 mmHg    Indications: Atrial fib SOB    Diagnoses: I48 0 - Atrial fibrillation    Sonographer:  RACH Braswell  Referring Physician:  Terrence Jama MD  Group:  [facility]  Interpreting Physician:  Sangeetha Becerril DO    SUMMARY    PROCEDURE INFORMATION:  This was a technically difficult study  LEFT VENTRICLE:  Systolic function was normal  Ejection fraction was estimated to be 55 %  Although no diagnostic regional wall motion abnormality was identified, this possibility cannot be completely excluded on the basis of this study  RIGHT VENTRICLE:  The ventricle was mildly to moderately dilated  Systolic function was mildly reduced  Wall thickness was increased  LEFT ATRIUM:  The atrium was dilated  RIGHT ATRIUM:  The atrium was dilated  MITRAL VALVE:  There was moderate regurgitation  The regurgitant jet was centrally directed  AORTIC VALVE:  There was mild regurgitation  TRICUSPID VALVE:  There was moderate regurgitation  Estimated peak PA pressure was 41 mmHg  The findings suggest mild pulmonary hypertension  PULMONIC VALVE:  There was mild regurgitation  IVC, HEPATIC VEINS:  The inferior vena cava was mildly dilated  Respirophasic changes were blunted (less than 50% variation)  HISTORY: PRIOR HISTORY: HLD, hypothyroid    PROCEDURE: The study was performed in the [address] [place] [address]  This was a routine study  The transthoracic approach was used   The study included complete 2D imaging, M-mode, complete spectral Doppler, and color Doppler  The  heart rate was 71 bpm, at the start of the study  Images were obtained from the parasternal, apical, subcostal, and suprasternal notch acoustic windows  Echocardiographic views were limited due to poor acoustic window availability  This  was a technically difficult study  LEFT VENTRICLE: Size was normal  Systolic function was normal  Ejection fraction was estimated to be 55 %  Although no diagnostic regional wall motion abnormality was identified, this possibility cannot be completely excluded on the basis  of this study  Wall thickness was normal  DOPPLER: Normal sinus rhythm was absent  The study was not technically sufficient to allow evaluation of LV diastolic function  RIGHT VENTRICLE: The ventricle was mildly to moderately dilated  Systolic function was mildly reduced  Wall thickness was increased  LEFT ATRIUM: The atrium was dilated  RIGHT ATRIUM: The atrium was dilated  MITRAL VALVE: Valve structure was normal  There was normal leaflet separation  DOPPLER: The transmitral velocity was within the normal range  There was no evidence for stenosis  There was moderate regurgitation  The regurgitant jet was  centrally directed  AORTIC VALVE: The valve was trileaflet  Leaflets exhibited normal cuspal separation and sclerosis  DOPPLER: Transaortic velocity was within the normal range  There was no evidence for stenosis  There was mild regurgitation  TRICUSPID VALVE: The valve structure was normal  There was normal leaflet separation  DOPPLER: The transtricuspid velocity was within the normal range  There was no evidence for stenosis  There was moderate regurgitation  Pulmonary artery  systolic pressure was mildly increased  Estimated peak PA pressure was 41 mmHg  The findings suggest mild pulmonary hypertension  PULMONIC VALVE: Leaflets exhibited normal thickness, no calcification, and normal cuspal separation  DOPPLER: The transpulmonic velocity was within the normal range  There was mild regurgitation  PERICARDIUM: There was no pericardial effusion  The pericardium was normal in appearance  AORTA: The root exhibited normal size  SYSTEMIC VEINS: IVC: The inferior vena cava was mildly dilated  Respirophasic changes were blunted (less than 50% variation)      SYSTEM MEASUREMENT TABLES    2D  %FS: 32 74 %  Ao Diam: 3 49 cm  Ao asc: 3 54 cm  EDV(Teich): 101 42 ml  EF(Teich): 61 17 %  ESV(Teich): 39 38 ml  IVSd: 0 71 cm  LA Diam: 4 63 cm  LVIDd: 4 68 cm  LVIDs: 3 15 cm  LVPWd: 0 73 cm  SV(Teich): 62 04 ml    CW  AV MaxP 84 mmHg  AV Vmax: 0 68 m/s  PV Vmax: 0 75 m/s  PV maxP 25 mmHg  TR MaxP 6 mmHg  TR Vmax: 2 53 m/s    MM  TAPSE: 1 56 cm    PW  E' Sept: 0 1 m/s  E/E' Sept: 6 96  LVOT Vmax: 0 48 m/s  LVOT maxP 93 mmHg  MV Dec Lea: 5 16 m/s2  MV DecT: 135 86 ms  MV E [place]: 0 69 m/s  MV PHT: 39 4 ms  MVA By PHT: 5 58 cm2  RVOT Vmax: 0 5 m/s  RVOT maxP mmHg    [facility]    Prepared and electronically signed by    Rafael Moore DO  Signed 24-Mar-2021 09:01:07    Results for orders placed during the hospital encounter of 21    VENKATESH    Narrative  [address] [id number] [phone]    Transesophageal Echocardiogram  2D, Doppler, and Color Doppler    Study date:  2021    Patient: Suzy Cleveland  MR number: [id number]  Account number: [de-identified]  : 1945  Age: 67 years  Gender: Male  Status: Inpatient  Location: Cath lab  Height: 69 in  Weight: 148 lb  BP: 21612/ 72 mmHg    Indications: A-Fib    Diagnoses: I48 0 - Atrial fibrillation    Sonographer:  RACH Montero  Interpreting Physician:  John Talamantes MD  Primary Physician:  Marce Carrel, MD  Referring Physician:  John Talamantes MD  Group:  [facility]    SUMMARY    LEFT VENTRICLE:  Systolic function was normal  Ejection fraction was estimated to be 55 %  LEFT ATRIUM:  The atrium was mildly dilated  LEFT ATRIAL APPENDAGE:  The size was normal   The function was mildly reduced (mildly reduced emptying velocity)  No thrombus was identified  ATRIAL SEPTUM:  No defect or patent foramen ovale was identified  MITRAL VALVE:  There was mild regurgitation  AORTIC VALVE:  There was trace regurgitation  TRICUSPID VALVE:  There was mild regurgitation  HISTORY: PRIOR HISTORY: HLD and Hypothyroidism    PROCEDURE: The procedure was performed in the catheterization laboratory  This was a routine study  The risks and alternatives of the procedure were explained to the patient and informed consent was obtained  The transesophageal approach  was used  The study included complete 2D imaging, limited spectral Doppler, and color Doppler  The heart rate was 55 bpm, at the start of the study  An adult omniplane probe was inserted by the attending cardiologist  Intubated with ease  One intubation attempt(s)  There was no blood detected on the probe  Image quality was good  There were no complications during the procedure  MEDICATIONS: Anesthesia administered by anesthesia team     LEFT VENTRICLE: Size was normal  Systolic function was normal  Ejection fraction was estimated to be 55 %  RIGHT VENTRICLE: The size was normal  Systolic function was normal  Wall thickness was normal     LEFT ATRIUM: The atrium was mildly dilated  No thrombus was identified  APPENDAGE: The size was normal  No thrombus was identified  DOPPLER: The function was mildly reduced (mildly reduced emptying velocity)  ATRIAL SEPTUM: No defect or patent foramen ovale was identified  RIGHT ATRIUM: Size was normal  No thrombus was identified  MITRAL VALVE: Valve structure was normal  There was normal leaflet separation  There was no echocardiographic evidence of vegetation   DOPPLER: There was mild regurgitation  AORTIC VALVE: The valve was trileaflet  Leaflets exhibited normal thickness and normal cuspal separation  There was no echocardiographic evidence of vegetation  DOPPLER: There was trace regurgitation  TRICUSPID VALVE: The valve structure was normal  There was normal leaflet separation  There was no echocardiographic evidence of vegetation  DOPPLER: There was mild regurgitation  PERICARDIUM: There was no pericardial effusion  The pericardium was normal in appearance  [address] Accredited Echocardiography Laboratory    Prepared and electronically signed by    Marika Humphreys MD  Signed 29-Jun-2021 14:24:23    No results found for this or any previous visit  No results found for this or any previous visit  I reviewed and interpreted the following LABS/EKG/TELE/IMAGING and below is summary of my interpretation (if data available):    LABS:bpm    Current EKG and Rhythm Strip: AV pacing w narrow QRS  PPM check no afib

## 2022-04-19 ENCOUNTER — REMOTE DEVICE CLINIC VISIT (OUTPATIENT)
Dept: CARDIOLOGY CLINIC | Facility: CLINIC | Age: 77
End: 2022-04-19
Payer: MEDICARE

## 2022-04-19 DIAGNOSIS — Z95.0 PRESENCE OF PERMANENT CARDIAC PACEMAKER: Primary | ICD-10-CM

## 2022-04-19 PROCEDURE — 93296 REM INTERROG EVL PM/IDS: CPT | Performed by: INTERNAL MEDICINE

## 2022-04-19 PROCEDURE — 93294 REM INTERROG EVL PM/LDLS PM: CPT | Performed by: INTERNAL MEDICINE

## 2022-04-19 NOTE — PROGRESS NOTES
MDT DUAL PM/ ACTIVE SYSTEM IS MRI CONDITIONAL   CARELINK TRANSMISSION:  BATTERY VOLTAGE ADEQUATE (11 1 YR)    AP 70 0%  97 6% (>40%/DDDR 60 PPM, MVP OFF, HIS)   ALL LEAD PARAMETERS WITHIN NORMAL LIMITS   NO SIGNIFICANT HIGH RATE EPISODES   NORMAL DEVICE FUNCTION   RG

## 2022-06-02 ENCOUNTER — OFFICE VISIT (OUTPATIENT)
Dept: FAMILY MEDICINE CLINIC | Facility: CLINIC | Age: 77
End: 2022-06-02
Payer: MEDICARE

## 2022-06-02 VITALS
HEART RATE: 62 BPM | OXYGEN SATURATION: 96 % | DIASTOLIC BLOOD PRESSURE: 72 MMHG | HEIGHT: 69 IN | RESPIRATION RATE: 16 BRPM | SYSTOLIC BLOOD PRESSURE: 120 MMHG | WEIGHT: 148 LBS | BODY MASS INDEX: 21.92 KG/M2

## 2022-06-02 DIAGNOSIS — I48.19 PERSISTENT ATRIAL FIBRILLATION (HCC): Primary | ICD-10-CM

## 2022-06-02 DIAGNOSIS — E78.2 MIXED HYPERLIPIDEMIA: ICD-10-CM

## 2022-06-02 DIAGNOSIS — E03.9 ACQUIRED HYPOTHYROIDISM: ICD-10-CM

## 2022-06-02 DIAGNOSIS — Z78.9 STATIN INTOLERANCE: ICD-10-CM

## 2022-06-02 DIAGNOSIS — C64.2 RENAL CELL ADENOCARCINOMA, LEFT (HCC): ICD-10-CM

## 2022-06-02 DIAGNOSIS — Z00.00 WELL ADULT EXAM: ICD-10-CM

## 2022-06-02 PROCEDURE — 99214 OFFICE O/P EST MOD 30 MIN: CPT | Performed by: FAMILY MEDICINE

## 2022-06-02 PROCEDURE — G0439 PPPS, SUBSEQ VISIT: HCPCS | Performed by: FAMILY MEDICINE

## 2022-06-02 NOTE — PROGRESS NOTES
Assessment/Plan:    1  Persistent atrial fibrillation ([address] )    2  Acquired hypothyroidism    3  Renal cell adenocarcinoma, left ([address] )    4  Mixed hyperlipidemia    5  Statin intolerance    6  Well adult exam     Well even taking prava 10mg is too strong and he is complaining of SE   So start with once a week and work up   Also look up / ask about repatha / and nexlizet     Subjective:      Patient ID: Braeden Wiggins Sr  is a 65 y o  male  HPI Hx of   PAF   HLD with statin intol   RCC left - s/p cryo       The following portions of the patient's history were reviewed and updated as appropriate: allergies, current medications, past family history, past medical history, past social history, past surgical history and problem list     Review of Systems   Constitutional: Negative for activity change, appetite change and fever  HENT: Negative for congestion, nosebleeds and trouble swallowing  Eyes: Negative for itching  Respiratory: Negative for cough and chest tightness  Cardiovascular: Negative for chest pain and palpitations  Gastrointestinal: Negative for abdominal pain, constipation, diarrhea and nausea  Endocrine: Negative for cold intolerance  Genitourinary: Negative for frequency  Musculoskeletal: Negative for gait problem and joint swelling  Skin: Negative for rash  Allergic/Immunologic: Negative for immunocompromised state  Neurological: Negative for dizziness, tremors, seizures, syncope and headaches  Psychiatric/Behavioral: Negative for hallucinations and suicidal ideas  Objective:      /72 (BP Location: Right arm, Patient Position: Sitting, Cuff Size: Standard)   Pulse 62   Resp 16   Ht 5' 9" (1 753 m)   Wt 67 1 kg (148 lb)   SpO2 96%   BMI 21 86 kg/m²     No visits with results within 2 Week(s) from this visit     Latest known visit with results is:   Appointment on 01/12/2022   Component Date Value    Cholesterol 01/12/2022 300 (A)    Triglycerides 01/12/2022 89     HDL, Direct 01/12/2022 55     LDL Calculated 01/12/2022 227 (A)    WBC 01/12/2022 5 49     RBC 01/12/2022 4 10     Hemoglobin 01/12/2022 13 7     Hematocrit 01/12/2022 40 9     MCV 01/12/2022 100 (A)    MCH 01/12/2022 33 4     MCHC 01/12/2022 33 5     RDW 01/12/2022 12 9     MPV 01/12/2022 9 3     Platelets 65/35/0686 202     nRBC 01/12/2022 0     Neutrophils Relative 01/12/2022 61     Immat GRANS % 01/12/2022 0     Lymphocytes Relative 01/12/2022 25     Monocytes Relative 01/12/2022 12     Eosinophils Relative 01/12/2022 2     Basophils Relative 01/12/2022 0     Neutrophils Absolute 01/12/2022 3 34     Immature Grans Absolute 01/12/2022 0 01     Lymphocytes Absolute 01/12/2022 1 35     Monocytes Absolute 01/12/2022 0 68     Eosinophils Absolute 01/12/2022 0 09     Basophils Absolute 01/12/2022 0 02     Sodium 01/12/2022 135 (A)    Potassium 01/12/2022 4 5     Chloride 01/12/2022 101     CO2 01/12/2022 32     ANION GAP 01/12/2022 2 (A)    BUN 01/12/2022 28 (A)    Creatinine 01/12/2022 1 02     Glucose, Fasting 01/12/2022 88     Calcium 01/12/2022 10 0     eGFR 01/12/2022 70     TSH 3RD GENERATON 01/12/2022 1 040     Vit D, 25-Hydroxy 01/12/2022 50 0     Hepatitis C Ab 01/12/2022 Non-reactive           Physical Exam  Vitals and nursing note reviewed  Constitutional:       General: He is not in acute distress  Appearance: He is well-developed  HENT:      Head: Normocephalic and atraumatic  Cardiovascular:      Rate and Rhythm: Normal rate  Rhythm irregular  Heart sounds: Normal heart sounds  No murmur heard  Pulmonary:      Effort: Pulmonary effort is normal       Breath sounds: Normal breath sounds  No wheezing or rales  Abdominal:      General: Bowel sounds are normal  There is no distension  Palpations: Abdomen is soft  Tenderness: There is no abdominal tenderness  There is no guarding or rebound     Musculoskeletal:         General: No tenderness  Normal range of motion  Cervical back: Normal range of motion and neck supple  Lymphadenopathy:      Cervical: No cervical adenopathy  Skin:     General: Skin is warm and dry  Capillary Refill: Capillary refill takes less than 2 seconds  Findings: No rash  Neurological:      Mental Status: He is alert and oriented to person, place, and time  Cranial Nerves: No cranial nerve deficit  Sensory: No sensory deficit  Motor: No abnormal muscle tone  Psychiatric:         Behavior: Behavior normal          Thought Content:  Thought content normal          Judgment: Judgment normal              Ignacio MD Donna Ceja

## 2022-06-02 NOTE — PROGRESS NOTES
Assessment and Plan:     Problem List Items Addressed This Visit        Endocrine    Hypothyroidism       Cardiovascular and Mediastinum    Persistent atrial fibrillation ([facility] ) - Primary       Genitourinary    Renal cell adenocarcinoma, left ([facility] )       Other    Pure hypercholesterolemia    Statin intolerance      Other Visit Diagnoses     Well adult exam              Depression Screening and Follow-up Plan: Patient was screened for depression during today's encounter  They screened negative with a PHQ-2 score of 0  Preventive health issues were discussed with patient, and age appropriate screening tests were ordered as noted in patient's After Visit Summary  Personalized health advice and appropriate referrals for health education or preventive services given if needed, as noted in patient's After Visit Summary       History of Present Illness:     Patient presents for Medicare Annual Wellness visit    Patient Care Team:  Bruce Cruz MD as PCP - General (Family Medicine)     Problem List:     Patient Active Problem List   Diagnosis    Renal cell adenocarcinoma, left ([facility] )    Vitamin D deficiency, unspecified    Pure hypercholesterolemia    Malignant neoplasm of left kidney, except renal pelvis ([facility] )    Hyperlipidemia    Hypothyroidism    Gastroesophageal reflux    Shortness of breath    Medicare annual wellness visit, subsequent    Persistent atrial fibrillation ([facility] )    Gastroesophageal reflux disease without esophagitis    Mixed hyperlipidemia    Chronic diastolic (congestive) heart failure ([facility] )    Statin intolerance      Past Medical and Surgical History:     Past Medical History:   Diagnosis Date    Dyslipidemia     Erectile dysfunction of non-organic origin     Gastroesophageal reflux     Hyperlipidemia     Hypothyroidism     Liver disease     [facility] )     Left    Malignant neoplasm of left kidney, except renal pelvis ([facility] )     Pure hypercholesterolemia  Thyroid disease     Vitamin D deficiency, unspecified      Past Surgical History:   Procedure Laterality Date    CATARACT EXTRACTION Bilateral 2014    CYSTOSCOPY      EGD AND COLONOSCOPY  12/15/2009    HERNIA REPAIR Left     Inguinal    URETHRA SURGERY  05/2010    Done by Dr Caesar Carranza       Family History:     Family History   Problem Relation Age of Onset    Cancer Mother         bladder    Heart disease Mother     Prostate cancer Father     Stroke Father     Heart disease Father     Hyperlipidemia Family       Social History:     Social History     Socioeconomic History    Marital status: /Civil Union     Spouse name: None    Number of children: None    Years of education: None    Highest education level: None   Occupational History    None   Tobacco Use    Smoking status: Former Smoker    Smokeless tobacco: Never Used    Tobacco comment: quit at age 23   Vaping Use    Vaping Use: Former   Substance and Sexual Activity    Alcohol use: Not Currently    Drug use: No    Sexual activity: None   Other Topics Concern    None   Social History Narrative    None     Social Determinants of Health     Financial Resource Strain: Not on file   Food Insecurity: Not on file   Transportation Needs: Not on file   Physical Activity: Not on file   Stress: Not on file   Social Connections: Not on file   Intimate Partner Violence: Not on file   Housing Stability: Not on file      Medications and Allergies:     Current Outpatient Medications   Medication Sig Dispense Refill    Ascorbic Acid (vitamin C) 1000 MG tablet Take 1,000 mg by mouth daily      cholecalciferol (VITAMIN D3) 1,000 units tablet Take 1,000 Units by mouth daily      Coenzyme Q10 (CoQ10) 100 MG CAPS Take 2 capsules (200 mg total) by mouth daily 60 capsule 0    ketoconazole (NIZORAL) 2 % cream APPLY TO AFFECTED AREA(S) ON LEFT HAND AND FEET TWO TIMES DAILY      levothyroxine 75 mcg tablet Take 1 tablet (75 mcg total) by mouth daily in the early morning 90 tablet 1    magnesium oxide (MAG-OX) 400 mg Take 400 mg by mouth daily      melatonin 3 mg Take by mouth daily at bedtime      Multiple Vitamins-Minerals (MULTIVITAMIN ADULT PO) Take by mouth every other day       potassium chloride (MICRO-K) 10 MEQ CR capsule Take 2 capsules (20 mEq total) by mouth daily 180 capsule 3    pravastatin (PRAVACHOL) 10 mg tablet Take 1 tablet (10 mg total) by mouth daily 30 tablet 3    Probiotic Product (CULTURELLE PROBIOTICS PO) Take by mouth      rivaroxaban (XARELTO) 20 mg tablet Take 1 tablet (20 mg total) by mouth daily after dinner 90 tablet 3    sotalol (BETAPACE) 80 mg tablet Take 1 tablet (80 mg total) by mouth every 12 (twelve) hours 90 tablet 3    vitamin B-12 (VITAMIN B-12) 1,000 mcg tablet Take by mouth daily      furosemide (LASIX) 20 mg tablet Take 1 tablet (20 mg total) by mouth daily as needed (for edema/weight gain) (Patient not taking: No sig reported) 90 tablet 0     No current facility-administered medications for this visit       Allergies   Allergen Reactions    Atorvastatin      Muscle pain    Crestor [Rosuvastatin] Dizziness    Vytorin [Ezetimibe-Simvastatin] Dizziness      Immunizations:     Immunization History   Administered Date(s) Administered    INFLUENZA 09/22/2020    Pneumococcal 03/23/2009    Pneumococcal Conjugate 13-Valent 10/07/2015    Pneumococcal Conjugate PCV 7 03/23/2009    Pneumococcal Polysaccharide PPV23 12/01/2021    influenza, trivalent, adjuvanted 09/22/2020      Health Maintenance:         Topic Date Due    Hepatitis C Screening  Completed         Topic Date Due    COVID-19 Vaccine (1) Never done    Influenza Vaccine (Season Ended) 09/01/2022      Medicare Health Risk Assessment:     /72 (BP Location: Right arm, Patient Position: Sitting, Cuff Size: Standard)   Pulse 62   Resp 16   Ht 5' 9" (1 753 m)   Wt 67 1 kg (148 lb)   SpO2 96%   BMI 21 86 kg/m²      Gerardo Sandhu is here for his Subsequent Wellness visit  Last Medicare Wellness visit information reviewed, patient interviewed and updates made to the record today  Health Risk Assessment:   Patient rates overall health as good  Patient feels that their physical health rating is same  Patient is very satisfied with their life  Eyesight was rated as same  Hearing was rated as same  Patient feels that their emotional and mental health rating is same  Patients states they are never, rarely angry  Patient states they are never, rarely unusually tired/fatigued  Pain experienced in the last 7 days has been none  Patient states that he has experienced no weight loss or gain in last 6 months  Depression Screening:   PHQ-2 Score: 0      Fall Risk Screening: In the past year, patient has experienced: no history of falling in past year      Home Safety:  Patient does not have trouble with stairs inside or outside of their home  Patient has working smoke alarms and has no working carbon monoxide detector  Home safety hazards include: none  Nutrition:   Current diet is Regular  Medications:   Patient is currently taking over-the-counter supplements  OTC medications include: see medication list  Patient is able to manage medications  Activities of Daily Living (ADLs)/Instrumental Activities of Daily Living (IADLs):   Walk and transfer into and out of bed and chair?: Yes  Dress and groom yourself?: Yes    Bathe or shower yourself?: Yes    Feed yourself?  Yes  Do your laundry/housekeeping?: Yes  Manage your money, pay your bills and track your expenses?: Yes  Make your own meals?: Yes    Do your own shopping?: Yes    Previous Hospitalizations:   Any hospitalizations or ED visits within the last 12 months?: Yes    How many hospitalizations have you had in the last year?: 1-2    Advance Care Planning:   Living will: No    Durable POA for healthcare: No    Advanced directive: No    Five wishes given: No      Cognitive Screening:   Provider or family/friend/caregiver concerned regarding cognition?: No    PREVENTIVE SCREENINGS      Cardiovascular Screening:    General: Screening Not Indicated, History Lipid Disorder and Screening Current      Diabetes Screening:     General: Screening Current      Colorectal Cancer Screening:     General: Screening Not Indicated      Prostate Cancer Screening:    General: Screening Not Indicated      Osteoporosis Screening:    General: Screening Not Indicated      Abdominal Aortic Aneurysm (AAA) Screening:    Risk factors include: tobacco use        Lung Cancer Screening:     General: Screening Not Indicated      Hepatitis C Screening:    General: Screening Current    Screening, Brief Intervention, and Referral to Treatment (SBIRT)    Screening  Typical number of drinks in a day: 0  Typical number of drinks in a week: 0  Interpretation: Low risk drinking behavior  Single Item Drug Screening:  How often have you used an illegal drug (including marijuana) or a prescription medication for non-medical reasons in the past year? never    Single Item Drug Screen Score: 0  Interpretation: Negative screen for possible drug use disorder    Brief Intervention  Alcohol & drug use screenings were reviewed  No concerns regarding substance use disorder identified         Beatriz Lopez MD

## 2022-06-02 NOTE — PATIENT INSTRUCTIONS
Medicare Preventive Visit Patient Instructions  Thank you for completing your Welcome to Medicare Visit or Medicare Annual Wellness Visit today  Your next wellness visit will be due in one year (6/3/2023)  The screening/preventive services that you may require over the next 5-10 years are detailed below  Some tests may not apply to you based off risk factors and/or age  Screening tests ordered at today's visit but not completed yet may show as past due  Also, please note that scanned in results may not display below  Preventive Screenings:  Service Recommendations Previous Testing/Comments   Colorectal Cancer Screening  · Colonoscopy    · Fecal Occult Blood Test (FOBT)/Fecal Immunochemical Test (FIT)  · Fecal DNA/Cologuard Test  · Flexible Sigmoidoscopy Age: 54-65 years old   Colonoscopy: every 10 years (May be performed more frequently if at higher risk)  OR  FOBT/FIT: every 1 year  OR  Cologuard: every 3 years  OR  Sigmoidoscopy: every 5 years  Screening may be recommended earlier than age 48 if at higher risk for colorectal cancer  Also, an individualized decision between you and your healthcare provider will decide whether screening between the ages of 74-80 would be appropriate   Colonoscopy: Not on file  FOBT/FIT: Not on file  Cologuard: Not on file  Sigmoidoscopy: Not on file          Prostate Cancer Screening Individualized decision between patient and health care provider in men between ages of 53-78   Medicare will cover every 12 months beginning on the day after your 50th birthday PSA: 0 6 ng/mL     Screening Not Indicated     Hepatitis C Screening Once for adults born between 1945 and 1965  More frequently in patients at high risk for Hepatitis C Hep C Antibody: 01/12/2022    Screening Current   Diabetes Screening 1-2 times per year if you're at risk for diabetes or have pre-diabetes Fasting glucose: 88 mg/dL   A1C: No results in last 5 years    Screening Current   Cholesterol Screening Once every 5 years if you don't have a lipid disorder  May order more often based on risk factors  Lipid panel: 01/12/2022    Screening Not Indicated  History Lipid Disorder      Other Preventive Screenings Covered by Medicare:  1  Abdominal Aortic Aneurysm (AAA) Screening: covered once if your at risk  You're considered to be at risk if you have a family history of AAA or a male between the age of 73-68 who smoking at least 100 cigarettes in your lifetime  2  Lung Cancer Screening: covers low dose CT scan once per year if you meet all of the following conditions: (1) Age 50-69; (2) No signs or symptoms of lung cancer; (3) Current smoker or have quit smoking within the last 15 years; (4) You have a tobacco smoking history of at least 30 pack years (packs per day x number of years you smoked); (5) You get a written order from a healthcare provider  3  Glaucoma Screening: covered annually if you're considered high risk: (1) You have diabetes OR (2) Family history of glaucoma OR (3)  aged 48 and older OR (3)  American aged 72 and older  3  Osteoporosis Screening: covered every 2 years if you meet one of the following conditions: (1) Have a vertebral abnormality; (2) On glucocorticoid therapy for more than 3 months; (3) Have primary hyperparathyroidism; (4) On osteoporosis medications and need to assess response to drug therapy  5  HIV Screening: covered annually if you're between the age of 12-76  Also covered annually if you are younger than 13 and older than 72 with risk factors for HIV infection  For pregnant patients, it is covered up to 3 times per pregnancy      Immunizations:  Immunization Recommendations   Influenza Vaccine Annual influenza vaccination during flu season is recommended for all persons aged >= 6 months who do not have contraindications   Pneumococcal Vaccine (Prevnar and Pneumovax)  * Prevnar = PCV13  * Pneumovax = PPSV23 Adults 25-60 years old: 1-3 doses may be recommended based on certain risk factors  Adults 72 years old: Prevnar (PCV13) vaccine recommended followed by Pneumovax (PPSV23) vaccine  If already received PPSV23 since turning 65, then PCV13 recommended at least one year after PPSV23 dose  Hepatitis B Vaccine 3 dose series if at intermediate or high risk (ex: diabetes, end stage renal disease, liver disease)   Tetanus (Td) Vaccine - COST NOT COVERED BY MEDICARE PART B Following completion of primary series, a booster dose should be given every 10 years to maintain immunity against tetanus  Td may also be given as tetanus wound prophylaxis  Tdap Vaccine - COST NOT COVERED BY MEDICARE PART B Recommended at least once for all adults  For pregnant patients, recommended with each pregnancy  Shingles Vaccine (Shingrix) - COST NOT COVERED BY MEDICARE PART B  2 shot series recommended in those aged 48 and above     Health Maintenance Due:      Topic Date Due    Hepatitis C Screening  Completed     Immunizations Due:      Topic Date Due    COVID-19 Vaccine (1) Never done    Influenza Vaccine (Season Ended) 09/01/2022     Advance Directives   What are advance directives? Advance directives are legal documents that state your wishes and plans for medical care  These plans are made ahead of time in case you lose your ability to make decisions for yourself  Advance directives can apply to any medical decision, such as the treatments you want, and if you want to donate organs  What are the types of advance directives? There are many types of advance directives, and each state has rules about how to use them  You may choose a combination of any of the following:  · Living will: This is a written record of the treatment you want  You can also choose which treatments you do not want, which to limit, and which to stop at a certain time  This includes surgery, medicine, IV fluid, and tube feedings  · Durable power of  for healthcare [facility]):   This is a written record that states who you want to make healthcare choices for you when you are unable to make them for yourself  This person, called a proxy, is usually a family member or a friend  You may choose more than 1 proxy  · Do not resuscitate (DNR) order:  A DNR order is used in case your heart stops beating or you stop breathing  It is a request not to have certain forms of treatment, such as CPR  A DNR order may be included in other types of advance directives  · Medical directive: This covers the care that you want if you are in a coma, near death, or unable to make decisions for yourself  You can list the treatments you want for each condition  Treatment may include pain medicine, surgery, blood transfusions, dialysis, IV or tube feedings, and a ventilator (breathing machine)  · Values history: This document has questions about your views, beliefs, and how you feel and think about life  This information can help others choose the care that you would choose  Why are advance directives important? An advance directive helps you control your care  Although spoken wishes may be used, it is better to have your wishes written down  Spoken wishes can be misunderstood, or not followed  Treatments may be given even if you do not want them  An advance directive may make it easier for your family to make difficult choices about your care  © Copyright [employer] 2018 Information is for End User's use only and may not be sold, redistributed or otherwise used for commercial purposes   All illustrations and images included in CareNotes® are the copyrighted property of [employer]  or [address] [place] [address] [employer]

## 2022-06-05 PROBLEM — Z78.9 STATIN INTOLERANCE: Status: ACTIVE | Noted: 2022-06-05

## 2022-06-15 ENCOUNTER — APPOINTMENT (OUTPATIENT)
Dept: LAB | Facility: AMBULARY SURGERY CENTER | Age: 77
End: 2022-06-15
Payer: MEDICARE

## 2022-06-15 DIAGNOSIS — C64.2 MALIGNANT NEOPLASM OF LEFT KIDNEY, EXCEPT RENAL PELVIS (HCC): ICD-10-CM

## 2022-06-15 LAB
ANION GAP SERPL CALCULATED.3IONS-SCNC: 3 MMOL/L (ref 4–13)
BUN SERPL-MCNC: 25 MG/DL (ref 5–25)
CALCIUM SERPL-MCNC: 9.1 MG/DL (ref 8.3–10.1)
CHLORIDE SERPL-SCNC: 102 MMOL/L (ref 100–108)
CO2 SERPL-SCNC: 32 MMOL/L (ref 21–32)
CREAT SERPL-MCNC: 1.01 MG/DL (ref 0.6–1.3)
GFR SERPL CREATININE-BSD FRML MDRD: 70 ML/MIN/1.73SQ M
GLUCOSE P FAST SERPL-MCNC: 95 MG/DL (ref 65–99)
POTASSIUM SERPL-SCNC: 4.3 MMOL/L (ref 3.5–5.3)
SODIUM SERPL-SCNC: 137 MMOL/L (ref 136–145)

## 2022-06-15 PROCEDURE — 80048 BASIC METABOLIC PNL TOTAL CA: CPT

## 2022-06-15 PROCEDURE — 36415 COLL VENOUS BLD VENIPUNCTURE: CPT

## 2022-06-17 ENCOUNTER — HOSPITAL ENCOUNTER (OUTPATIENT)
Dept: CT IMAGING | Facility: HOSPITAL | Age: 77
Discharge: HOME | End: 2022-06-17
Payer: MEDICARE

## 2022-06-17 DIAGNOSIS — C64.2 MALIGNANT NEOPLASM OF LEFT KIDNEY, EXCEPT RENAL PELVIS (HCC): ICD-10-CM

## 2022-06-17 PROCEDURE — G1004 CDSM NDSC: HCPCS

## 2022-06-17 PROCEDURE — 74178 CT ABD&PLV WO CNTR FLWD CNTR: CPT

## 2022-06-17 RX ADMIN — IOHEXOL 70 ML: 350 INJECTION, SOLUTION INTRAVENOUS at 13:25

## 2022-06-29 ENCOUNTER — OFFICE VISIT (OUTPATIENT)
Dept: UROLOGY | Facility: AMBULATORY SURGERY CENTER | Age: 77
End: 2022-06-29
Payer: MEDICARE

## 2022-06-29 VITALS
HEIGHT: 69 IN | SYSTOLIC BLOOD PRESSURE: 126 MMHG | DIASTOLIC BLOOD PRESSURE: 72 MMHG | BODY MASS INDEX: 21.92 KG/M2 | HEART RATE: 78 BPM | WEIGHT: 148 LBS

## 2022-06-29 DIAGNOSIS — C64.2 MALIGNANT NEOPLASM OF LEFT KIDNEY, EXCEPT RENAL PELVIS (HCC): Primary | ICD-10-CM

## 2022-06-29 PROCEDURE — 99214 OFFICE O/P EST MOD 30 MIN: CPT | Performed by: NURSE PRACTITIONER

## 2022-06-29 NOTE — PROGRESS NOTES
6/29/2022      Chief Complaint   Patient presents with    malignant neoplasm of left kidney      Assessment and Plan    65 y o  male managed by Dr Ben Aguilar    1  Renal Cell Carcinoma  · S/P cryoablation 6/2020  · CT abd pelvis 6/17/2022 unremarkable for metastasis or recurrence  · Ultrasound of kidney bladder and BMP in 6 months  · Follow up in the office in 6 months    2  Urinary Urgency/Urge Incontinence  · Continue with dietary behavior modifications to reduce irritative symptoms  · We discussed need to continue with Kegel/pelvic floor exercises  Patient education material provided    Dr Ben Aguilar, please reach out to patient regarding the length of time you prefer for him to continue with diagnostic imaging      History of Present Illness  Lincoln Cline Sr  is a 65 y o  male here for follow up evaluation of  left papillary renal cell carcinoma  Patient underwent percutaneous biopsy of left renal mass 06/2020 along with cryoablation  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  Patient reports to the office today for surveillance follow-up  Ultrasound of his kidney and bladder was performed 12/06/2021 with unremarkable findings  Patient reports doing well since his last office evaluation  He does report a mild increasing urinary urgency/urge incontinence and postvoid dribbling  He denies suprapubic abdominal pain and flank pain  He denies hematuria  Review of Systems   Constitutional: Negative for chills and fever  Respiratory: Negative for cough and shortness of breath  Cardiovascular: Negative for chest pain  Gastrointestinal: Negative for abdominal distention, abdominal pain, blood in stool, nausea and vomiting  Genitourinary: Negative for difficulty urinating, dysuria, enuresis, flank pain, frequency, hematuria and urgency  Skin: Negative for rash         AUA SYMPTOM SCORE    Flowsheet Row Most Recent Value   AUA SYMPTOM SCORE    How often have you had a sensation of not emptying your bladder completely after you finished urinating? 1 (P)     How often have you had to urinate again less than two hours after you finished urinating? 4 (P)     How often have you found you stopped and started again several times when you urinate? 4 (P)     How often have you found it difficult to postpone urination? 1 (P)     How often have you had a weak urinary stream? 1 (P)     How often have you had to push or strain to begin urination? 0 (P)     How many times did you most typically get up to urinate from the time you went to bed at night until the time you got up in the morning? 5 (P)     Quality of Life: If you were to spend the rest of your life with your urinary condition just the way it is now, how would you feel about that? 3 (P)     AUA SYMPTOM SCORE 16 (P)              Past Medical History  Past Medical History:   Diagnosis Date    Dyslipidemia     Erectile dysfunction of non-organic origin     Gastroesophageal reflux     Hyperlipidemia     Hypothyroidism     Liver disease     Malignant neoplasm of kidney ([facility] [address] )     Left    Malignant neoplasm of left kidney, except renal pelvis ([facility] [address] )     Pure hypercholesterolemia     Thyroid disease     Vitamin D deficiency, unspecified        Past Social History  Past Surgical History:   Procedure Laterality Date    CATARACT EXTRACTION Bilateral 2014    CYSTOSCOPY      EGD AND COLONOSCOPY  12/15/2009    HERNIA REPAIR Left     Inguinal    URETHRA SURGERY  05/2010    Done by Dr aFustin Or History     Tobacco Use   Smoking Status Former Smoker   Smokeless Tobacco Never Used   Tobacco Comment    quit at age 23       Past Family History  Family History   Problem Relation Age of Onset    Cancer Mother         bladder    Heart disease Mother     Prostate cancer Father     Stroke Father     Heart disease Father     Hyperlipidemia Family        Past Social history  Social History     Socioeconomic History    Marital status: /Civil [employer]     Spouse name: Not on file    Number of children: Not on file    Years of education: Not on file    Highest education level: Not on file   Occupational History    Not on file   Tobacco Use    Smoking status: Former Smoker    Smokeless tobacco: Never Used    Tobacco comment: quit at age 23   Vaping Use    Vaping Use: Former   Substance and Sexual Activity    Alcohol use: Not Currently    Drug use: No    Sexual activity: Not on file   Other Topics Concern    Not on file   Social History Narrative    Not on file     Social Determinants of Health     Financial Resource Strain: Not on file   Food Insecurity: Not on file   Transportation Needs: Not on file   Physical Activity: Not on file   Stress: Not on file   Social Connections: Not on file   Intimate Partner Violence: Not on file   Housing Stability: Not on file       Current Medications  Current Outpatient Medications   Medication Sig Dispense Refill    Ascorbic Acid (vitamin C) 1000 MG tablet Take 1,000 mg by mouth daily      cholecalciferol (VITAMIN D3) 1,000 units tablet Take 1,000 Units by mouth daily      Coenzyme Q10 (CoQ10) 100 MG CAPS Take 2 capsules (200 mg total) by mouth daily 60 capsule 0    ketoconazole (NIZORAL) 2 % cream APPLY TO AFFECTED AREA(S) ON LEFT HAND AND FEET TWO TIMES DAILY      levothyroxine 75 mcg tablet Take 1 tablet (75 mcg total) by mouth daily in the early morning 90 tablet 1    magnesium oxide (MAG-OX) 400 mg Take 400 mg by mouth daily      melatonin 3 mg Take by mouth daily at bedtime      Multiple Vitamins-Minerals (MULTIVITAMIN ADULT PO) Take by mouth every other day       potassium chloride (MICRO-K) 10 MEQ CR capsule Take 2 capsules (20 mEq total) by mouth daily 180 capsule 3    pravastatin (PRAVACHOL) 10 mg tablet Take 1 tablet (10 mg total) by mouth daily 30 tablet 3    Probiotic Product (CULTURELLE PROBIOTICS PO) Take by mouth      rivaroxaban (XARELTO) 20 mg tablet Take 1 tablet (20 mg total) by mouth daily after dinner 90 tablet 3    sotalol (BETAPACE) 80 mg tablet Take 1 tablet (80 mg total) by mouth every 12 (twelve) hours 90 tablet 3    vitamin B-12 (VITAMIN B-12) 1,000 mcg tablet Take by mouth daily      furosemide (LASIX) 20 mg tablet Take 1 tablet (20 mg total) by mouth daily as needed (for edema/weight gain) (Patient not taking: No sig reported) 90 tablet 0     No current facility-administered medications for this visit  Allergies  Allergies   Allergen Reactions    Atorvastatin      Muscle pain    Crestor [Rosuvastatin] Dizziness    Vytorin [Ezetimibe-Simvastatin] Dizziness         The following portions of the patient's history were reviewed and updated as appropriate: allergies, current medications, past medical history, past social history, past surgical history and problem list       Vitals  Vitals:    06/29/22 1044   BP: 126/72   Pulse: 78   Weight: 67 1 kg (148 lb)   Height: 5' 9" (1 753 m)           Physical Exam  Physical Exam  Vitals reviewed  Constitutional:       General: He is not in acute distress  Appearance: Normal appearance  He is normal weight  HENT:      Head: Normocephalic  Cardiovascular:      Rate and Rhythm: Normal rate  Pulmonary:      Effort: No respiratory distress  Breath sounds: Normal breath sounds  Skin:     General: Skin is warm and dry  Neurological:      General: No focal deficit present  Mental Status: He is alert and oriented to person, place, and time         Results  No results found for this or any previous visit (from the past 1 hour(s)) ]  Lab Results   Component Value Date    PSA 0 6 12/02/2019    PSA 0 5 09/23/2016     Lab Results   Component Value Date    CALCIUM 9 1 06/15/2022    K 4 3 06/15/2022    CO2 32 06/15/2022     06/15/2022    BUN 25 06/15/2022    CREATININE 1 01 06/15/2022     Lab Results   Component Value Date    WBC 5 49 01/12/2022    HGB 13 7 01/12/2022    HCT 40 9 01/12/2022    MCV 100 (H) 01/12/2022     01/12/2022     CT ABDOMEN AND PELVIS WITH AND WITHOUT IV CONTRAST     INDICATION:   C64 2: Malignant neoplasm of left kidney, except renal pelvis      COMPARISON:  6/2/2021     TECHNIQUE: Initial CT of the abdomen and pelvis was performed without intravenous contrast   Subsequent dynamic CT evaluation of the abdomen and pelvis was performed after the administration of intravenous contrast in both nephrographic and delayed   phases after the administration of intravenous contrast    Axial, sagittal, and coronal 2D reformatted images were created from the source data and submitted for interpretation       Radiation dose length product (DLP) for this visit:  968 mGy-cm   This examination, like all CT scans performed in the [facility], was performed utilizing techniques to minimize radiation dose exposure, including the use of iterative   reconstruction and automated exposure control      IV Contrast:  70 mL of iohexol (OMNIPAQUE)  Enteric Contrast:  Enteric contrast was not administered      FINDINGS:     ABDOMEN     LEFT KIDNEY AND URETER:  There are post ablation changes in the left interpolar region, similar to the previous study  This measures approximately 1 4 x 1 5 cm, previously 1 4 x 1 5 cm  No recurrent enhancing mass identified  There is a stable slightly hyperdense left renal   cyst without enhancement  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  No detectable urothelial mass though the ureter was segmentally visualized on excretory images  No hydronephrosis or hydroureter  No urinary tract calculi  No perinephric collection      RIGHT KIDNEY AND URETER:  No solid renal mass  No detectable urothelial mass though the ureter were segmentally visualized on excretory images  No hydronephrosis or hydroureter  No urinary tract calculi  No perinephric collection      URINARY BLADDER:  No bladder wall mass    No calculi      LOWER CHEST:  No clinically significant abnormality identified in the visualized lower chest      LIVER/BILIARY TREE:  Unremarkable      GALLBLADDER:  No calcified gallstones  No pericholecystic inflammatory change      SPLEEN:  Unremarkable      PANCREAS:  Unremarkable      ADRENAL GLANDS:  Unremarkable      STOMACH AND BOWEL:  Unremarkable      APPENDIX:  No findings to suggest appendicitis      ABDOMINOPELVIC CAVITY:  No ascites  No free intraperitoneal air  No lymphadenopathy      VESSELS:  Unremarkable for patient's age      PELVIS     REPRODUCTIVE ORGANS:  Unremarkable for patient's age      ABDOMINAL WALL/INGUINAL REGIONS:  Unremarkable      OSSEOUS STRUCTURES:  No acute fracture or destructive osseous lesion      IMPRESSION:     No evidence of recurrent or metastatic disease status post cryoablation of left renal lesion          Orders  Orders Placed This Encounter   Procedures    US kidney and bladder     Standing Status:   Future     Standing Expiration Date:   6/29/2026     Scheduling Instructions:      "Prep required if being scheduled in conjunction with other studies, refer to those examination's Preps first before scheduling  All patients for US Kidney and Bladder they must drink 24 oz of water 60 minutes before your scheduled appointment time  This test requires you to have a FULL bladder  Please do not urinate before your test             Please bring your physician order, insurance cards, a form of photo ID and a list of your medications with you  Arrive 15 minutes prior to your appointment time in order to register  If you need to have lab work or a urinalysis, please do this AFTER your ultrasound  "     Order Specific Question:   Reason for Exam:     Answer:   Renal cell carcinoma status post cryoablation    Basic metabolic panel     This is a patient instruction: Patient fasting for 8 hours or longer recommended       Standing Status:   Future     Standing Expiration Date:   6/29/2023       Shasta Chavez Sofiya Araiza

## 2022-07-19 ENCOUNTER — REMOTE DEVICE CLINIC VISIT (OUTPATIENT)
Dept: CARDIOLOGY CLINIC | Facility: CLINIC | Age: 77
End: 2022-07-19
Payer: MEDICARE

## 2022-07-19 DIAGNOSIS — Z95.0 PRESENCE OF PERMANENT CARDIAC PACEMAKER: Primary | ICD-10-CM

## 2022-07-19 PROCEDURE — 93294 REM INTERROG EVL PM/LDLS PM: CPT | Performed by: INTERNAL MEDICINE

## 2022-07-19 PROCEDURE — 93296 REM INTERROG EVL PM/IDS: CPT | Performed by: INTERNAL MEDICINE

## 2022-07-19 NOTE — PROGRESS NOTES
Results for orders placed or performed in visit on 07/19/22   Cardiac EP device report    Narrative    MDT DUAL PM/ ACTIVE SYSTEM IS MRI CONDITIONAL  CARELINK TRANSMISSION:  BATTERY VOLTAGE ADEQUATE (10 7 YR)  AP 68 6%   97 4% (>40%/DDDR 60 PPM/HIS LEAD)  SM P WAVE SENSING AMPLITUDE (0 6MV)/STABLE; ALL OTHER LEAD PARAMETERS WITHIN NORMAL LIMITS  1 VT-MONITOR EPISODE W/EGM SHOWING NSVT (6 @ 188 BPM)  EF 55% (6/2021 V[facility] )  1 AT/AF EPISODE WITH AFLUTTER ON EGM, DURATION 16:47 MINS  AF BURDEN <0 1%  HX: SAME &  PT TAKES XARELTO, SOTALOL  NORMAL DEVICE FUNCTION    ES

## 2022-09-01 DIAGNOSIS — I48.20 CHRONIC ATRIAL FIBRILLATION (HCC): ICD-10-CM

## 2022-09-29 DIAGNOSIS — I48.19 PERSISTENT ATRIAL FIBRILLATION (HCC): ICD-10-CM

## 2022-09-29 DIAGNOSIS — E03.9 ACQUIRED HYPOTHYROIDISM: ICD-10-CM

## 2022-09-29 RX ORDER — SOTALOL HYDROCHLORIDE 80 MG/1
80 TABLET ORAL EVERY 12 HOURS SCHEDULED
Qty: 90 TABLET | Refills: 3 | Status: SHIPPED | OUTPATIENT
Start: 2022-09-29

## 2022-09-29 RX ORDER — LEVOTHYROXINE SODIUM 0.07 MG/1
75 TABLET ORAL
Qty: 90 TABLET | Refills: 1 | Status: SHIPPED | OUTPATIENT
Start: 2022-09-29

## 2022-10-12 ENCOUNTER — OFFICE VISIT (OUTPATIENT)
Dept: FAMILY MEDICINE CLINIC | Facility: CLINIC | Age: 77
End: 2022-10-12
Payer: MEDICARE

## 2022-10-12 VITALS
RESPIRATION RATE: 16 BRPM | SYSTOLIC BLOOD PRESSURE: 124 MMHG | TEMPERATURE: 98.1 F | OXYGEN SATURATION: 97 % | WEIGHT: 147 LBS | HEART RATE: 84 BPM | DIASTOLIC BLOOD PRESSURE: 82 MMHG | HEIGHT: 69 IN | BODY MASS INDEX: 21.77 KG/M2

## 2022-10-12 DIAGNOSIS — J06.9 ACUTE UPPER RESPIRATORY INFECTION: Primary | ICD-10-CM

## 2022-10-12 PROBLEM — Z00.00 MEDICARE ANNUAL WELLNESS VISIT, SUBSEQUENT: Status: RESOLVED | Noted: 2021-03-22 | Resolved: 2022-10-12

## 2022-10-12 PROCEDURE — 99213 OFFICE O/P EST LOW 20 MIN: CPT | Performed by: NURSE PRACTITIONER

## 2022-10-12 NOTE — PROGRESS NOTES
Name: Angeles Webster Sr       : 1945      MRN: [id number]  Encounter Provider: OPAL Thurston  Encounter Date: 10/12/2022   [place] [address]     1  Acute upper respiratory infection        Patient reports a little nasal congestion and sore throat for the past 6 days  Patient also reports occasional dry cough  Denies any fever, wheezing, SOB, vomiting, or diarrhea  Patient reports that his symptoms have improved  Patient reports that he just wanted to be checked  Lung sounds are clear  Posterior pharynx wnl  Discussed testing for covid but patient refuses at this time  Discussed with the patient that his symptoms are most likely caused by a viral URI  Patient instructed to drink plenty of fluids  Patient instructed to wear a mask outside of his home until his symptoms resolve  Patient instructed to follow-up if symptoms get worse or do not continue to get better  Subjective      Patient reports a sore throat for the past 6 days  Patient also reports a little nasal congestion  Denies any fever  Patient reports occasional dry cough  Denies any wheezing or SOB  Denies any vomiting or diarrhea  Patient reports that he has been taking aleve OTC  Patient reports that his symptoms have improved  Review of Systems   Constitutional: Positive for fatigue  Negative for chills and fever  HENT:        As noted in HPI  Eyes: Negative for pain, discharge and redness  Respiratory: Positive for cough  Negative for shortness of breath and wheezing  Cardiovascular: Negative for chest pain  Gastrointestinal: Negative for abdominal pain, diarrhea, nausea and vomiting  Musculoskeletal: Positive for myalgias  Skin: Negative for rash  Neurological: Positive for headaches  Negative for dizziness and syncope         Current Outpatient Medications on File Prior to Visit   Medication Sig   • Ascorbic Acid (vitamin C) 1000 MG tablet Take 1,000 mg by mouth daily   • cholecalciferol (VITAMIN D3) 1,000 units tablet Take 1,000 Units by mouth daily   • Coenzyme Q10 (CoQ10) 100 MG CAPS Take 2 capsules (200 mg total) by mouth daily   • furosemide (LASIX) 20 mg tablet Take 1 tablet (20 mg total) by mouth daily as needed (for edema/weight gain)   • ketoconazole (NIZORAL) 2 % cream APPLY TO AFFECTED AREA(S) ON LEFT HAND AND FEET TWO TIMES DAILY   • levothyroxine 75 mcg tablet Take 1 tablet (75 mcg total) by mouth daily in the early morning   • magnesium oxide (MAG-OX) 400 mg Take 400 mg by mouth daily   • melatonin 3 mg Take by mouth daily at bedtime   • Multiple Vitamins-Minerals (MULTIVITAMIN ADULT PO) Take by mouth every other day    • potassium chloride (MICRO-K) 10 MEQ CR capsule Take 2 capsules (20 mEq total) by mouth daily   • pravastatin (PRAVACHOL) 10 mg tablet Take 1 tablet (10 mg total) by mouth daily   • Probiotic Product (CULTURELLE PROBIOTICS PO) Take by mouth   • rivaroxaban (XARELTO) 20 mg tablet Take 1 tablet (20 mg total) by mouth daily after dinner   • sotalol (BETAPACE) 80 mg tablet Take 1 tablet (80 mg total) by mouth every 12 (twelve) hours   • vitamin B-12 (VITAMIN B-12) 1,000 mcg tablet Take by mouth daily       Objective     /82   Pulse 84   Temp 98 1 °F (36 7 °C) (Tympanic)   Resp 16   Ht 5' 9" (1 753 m)   Wt 66 7 kg (147 lb)   SpO2 97%   BMI 21 71 kg/m²     Physical Exam  Vitals reviewed  Constitutional:       General: He is not in acute distress  Appearance: He is not ill-appearing or diaphoretic  HENT:      Right Ear: Tympanic membrane, ear canal and external ear normal       Left Ear: Tympanic membrane, ear canal and external ear normal       Nose: Congestion present  Mouth/Throat:      Mouth: Mucous membranes are moist       Pharynx: Oropharynx is clear  No posterior oropharyngeal erythema     Eyes:      Conjunctiva/sclera: Conjunctivae normal       Pupils: Pupils are equal, round, and reactive to light  Cardiovascular:      Rate and Rhythm: Normal rate and regular rhythm  Pulses: Normal pulses  Heart sounds: Normal heart sounds  Pulmonary:      Effort: Pulmonary effort is normal  No respiratory distress  Breath sounds: Normal breath sounds  No wheezing  Musculoskeletal:      Comments: Gait wnl  Skin:     Findings: No rash  Neurological:      Mental Status: He is alert and oriented to person, place, and time     Psychiatric:         Mood and Affect: Mood normal        Royer OPAL Morales

## 2022-10-20 ENCOUNTER — IN-CLINIC DEVICE VISIT (OUTPATIENT)
Dept: CARDIOLOGY CLINIC | Facility: CLINIC | Age: 77
End: 2022-10-20
Payer: MEDICARE

## 2022-10-20 DIAGNOSIS — Z95.0 PRESENCE OF CARDIAC PACEMAKER: Primary | ICD-10-CM

## 2022-10-20 PROCEDURE — 93280 PM DEVICE PROGR EVAL DUAL: CPT | Performed by: INTERNAL MEDICINE

## 2022-10-20 NOTE — PROGRESS NOTES
Results for orders placed or performed in visit on 10/20/22   Cardiac EP device report    Narrative    MDT DUAL PM/ ACTIVE SYSTEM IS MRI CONDITIONAL  DEVICE INTERROGATED IN THE [facility] OFFICE: BATTERY VOLTAGE ADEQUATE (10 5 YRS)  AP 70 9%  98 1% (>40%/DDDR 60/HIS)  ALL LEAD PARAMETERS WITHIN NORMAL LIMITS  NO UNREPORTED HIGH RATE EPISODES  PATIENT ON XARELTO, SOTALOL  NO PROGRAMMING CHANGES MADE TO DEVICE PARAMETERS  PACEMAKER FUNCTIONING APPROPRIATELY    ES

## 2022-10-29 DIAGNOSIS — I48.19 PERSISTENT ATRIAL FIBRILLATION (HCC): ICD-10-CM

## 2022-10-29 DIAGNOSIS — I48.20 CHRONIC ATRIAL FIBRILLATION (HCC): ICD-10-CM

## 2022-10-31 RX ORDER — SOTALOL HYDROCHLORIDE 80 MG/1
80 TABLET ORAL EVERY 12 HOURS SCHEDULED
Qty: 90 TABLET | Refills: 0 | Status: SHIPPED | OUTPATIENT
Start: 2022-10-31

## 2022-11-07 ENCOUNTER — OFFICE VISIT (OUTPATIENT)
Dept: CARDIOLOGY CLINIC | Facility: CLINIC | Age: 77
End: 2022-11-07

## 2022-11-07 VITALS
HEART RATE: 82 BPM | BODY MASS INDEX: 21.02 KG/M2 | DIASTOLIC BLOOD PRESSURE: 76 MMHG | HEIGHT: 69 IN | SYSTOLIC BLOOD PRESSURE: 148 MMHG | WEIGHT: 141.9 LBS | OXYGEN SATURATION: 98 %

## 2022-11-07 DIAGNOSIS — I48.19 PERSISTENT ATRIAL FIBRILLATION (HCC): Primary | ICD-10-CM

## 2022-11-07 NOTE — PROGRESS NOTES
HEART AND VASCULAR  CARDIAC [address]  [address] [facility]    Outpatient   Follow-up  Today's Date: 11/07/22        Patient name: Beata Hernandez Sr  YOB: 1945  Sex: male         Chief Complaint: f/u afib/pacemaker      ASSESSMENT:  Problem List Items Addressed This Visit        Cardiovascular and Mediastinum    Persistent atrial fibrillation ([address] ) - Primary    Relevant Orders    POCT ECG        66 yo  1) h/o persistent afib, in NSR since DCCV and on sotalol 80mg bid  2) Dual chamber ppm AV payal disease  Slow afib  100% RV paced w LPFB lead  3) Dyslipidemia, LDL >300 and intolerant to statins in past but now on pravastatin 10mg daily and   4) Atheroscloerosis/CAD 50% Mid LAD            PLAN:  1>DOing great with no afib, event through a 1 month long "Flu like illness  2  TOlerating pravastain 10mg daily (didn't tolerate other statins)  Cont Sotalol,, xarelto        Orders Placed This Encounter   Procedures   • POCT ECG     There are no discontinued medications    HPI/Subjective:   66 yo  1) h/o persistent afib, in NSR since DCCV and on sotalol 80mg bid  2) Dual chamber ppm AV payal disease  Slow afib  100% RV paced w LPFB lead  3) Dyslipidemia, LDL >300 and intolerant to statins in past but now on pravastatin 10mg daily and   4) Atheroscloerosis/CAD 50% Mid LAD    Ranulfo Day is doing well, just recovered from bad viral illness for a month, not tested for covid or flu  No hospitalization  Cough mostly resulted  Please note HPI is listed by problem with with update following it, it is copied again in the assessment above and reflects medical decision making as well  Complete 12 point ROS reviewed and otherwise non pertinent or negative except as per HPI pertinent positives in Cardiovascular and Respiratory emphasized   Please see paper chart for outpatient clinic patients where the patient completed the 12 point ROS survey  Past Medical History:   Diagnosis Date   • Dyslipidemia    • Erectile dysfunction of non-organic origin    • Gastroesophageal reflux    • Hyperlipidemia    • Hypothyroidism    • Liver disease    • Malignant neoplasm of kidney (HCC)     Left   • Malignant neoplasm of left kidney, except renal pelvis (HCC)    • Pure hypercholesterolemia    • Thyroid disease    • Vitamin D deficiency, unspecified        Allergies   Allergen Reactions   • Atorvastatin      Muscle pain   • Crestor [Rosuvastatin] Dizziness   • Vytorin [Ezetimibe-Simvastatin] Dizziness     I reviewed the Home Medication list and Allergies in the chart     Scheduled Meds:  Current Outpatient Medications   Medication Sig Dispense Refill   • Ascorbic Acid (vitamin C) 1000 MG tablet Take 1,000 mg by mouth daily     • cholecalciferol (VITAMIN D3) 1,000 units tablet Take 1,000 Units by mouth daily     • Coenzyme Q10 (CoQ10) 100 MG CAPS Take 2 capsules (200 mg total) by mouth daily 60 capsule 0   • levothyroxine 75 mcg tablet Take 1 tablet (75 mcg total) by mouth daily in the early morning 90 tablet 1   • magnesium oxide (MAG-OX) 400 mg Take 400 mg by mouth daily     • melatonin 3 mg Take by mouth daily at bedtime     • potassium chloride (MICRO-K) 10 MEQ CR capsule Take 2 capsules (20 mEq total) by mouth daily 180 capsule 3   • pravastatin (PRAVACHOL) 10 mg tablet Take 1 tablet (10 mg total) by mouth daily 30 tablet 3   • Probiotic Product (CULTURELLE PROBIOTICS PO) Take by mouth     • rivaroxaban (XARELTO) 20 mg tablet Take 1 tablet (20 mg total) by mouth daily after dinner 90 tablet 0   • sotalol (BETAPACE) 80 mg tablet Take 1 tablet (80 mg total) by mouth every 12 (twelve) hours 90 tablet 0   • vitamin B-12 (VITAMIN B-12) 1,000 mcg tablet Take by mouth daily     • furosemide (LASIX) 20 mg tablet Take 1 tablet (20 mg total) by mouth daily as needed (for edema/weight gain) (Patient not taking: No sig reported) 90 tablet 0   • ketoconazole (NIZORAL) 2 % cream APPLY TO AFFECTED AREA(S) ON LEFT HAND AND FEET TWO TIMES DAILY (Patient not taking: Reported on 11/7/2022)     • Multiple Vitamins-Minerals (MULTIVITAMIN ADULT PO) Take by mouth every other day  (Patient not taking: Reported on 11/7/2022)       No current facility-administered medications for this visit       PRN Meds:         Family History   Problem Relation Age of Onset   • Cancer Mother         bladder   • Heart disease Mother    • Prostate cancer Father    • Stroke Father    • Heart disease Father    • Hyperlipidemia Family        Social History     Socioeconomic History   • Marital status: /Civil Union     Spouse name: Not on file   • Number of children: Not on file   • Years of education: Not on file   • Highest education level: Not on file   Occupational History   • Not on file   Tobacco Use   • Smoking status: Former Smoker   • Smokeless tobacco: Never Used   • Tobacco comment: quit at age 23   Vaping Use   • Vaping Use: Former   Substance and Sexual Activity   • Alcohol use: Not Currently   • Drug use: No   • Sexual activity: Not on file   Other Topics Concern   • Not on file   Social History Narrative   • Not on file     Social Determinants of Health     Financial Resource Strain: Not on file   Food Insecurity: Not on file   Transportation Needs: Not on file   Physical Activity: Not on file   Stress: Not on file   Social Connections: Not on file   Intimate Partner Violence: Not on file   Housing Stability: Not on file         OBJECTIVE:    /76 (BP Location: Left arm, Patient Position: Sitting, Cuff Size: Adult)   Pulse 82   Ht 5' 9" (1 753 m)   Wt 64 4 kg (141 lb 14 4 oz)   SpO2 98%   BMI 20 95 kg/m²   Vitals:    11/07/22 0859   Weight: 64 4 kg (141 lb 14 4 oz)     GEN: No acute distress, Alert and oriented, well appearing  HEENT:External ears normal, oral pharynx clear, mucous membranes moist  EYES: Pupils equal, sclera anicteric, midline, normal conjuctiva  NECK: No JVD, supple, no obvious masses or thryomegaly or goiter  CARDIOVASCULAR:  RRR, No murmur, rub, gallops S1,S2  LUNGS: Clear To auscultation bilaterally, normal effort, no rales, rhonchi, crackles   ABDOMEN:  nondistended,  without obvious organomegaly or ascites  EXTREMITIES/VASCULAR:  No edema  warm an well perfused  PSYCH: Normal Affect,  linear speech pattern without evidence of psychosis  NEURO: Grossly intact, moving all extremiteis equal, face symmetric, alert and responsive, no obvious focal defecits   GAIT: Ambulates normally without difficulty  HEME: No bleeding, bruising, petechia, purpura   SKIN: No significant rashes on visibile skin, warm, no diaphoresis or pallor  Lab Results:       LABS:      Chemistry        Component Value Date/Time    K 4 3 06/15/2022 0718     06/15/2022 0718    CO2 32 06/15/2022 0718    BUN 25 06/15/2022 0718    CREATININE 1 01 06/15/2022 0718        Component Value Date/Time    CALCIUM 9 1 06/15/2022 0718    ALKPHOS 63 06/10/2021 1114    AST 25 06/10/2021 1114    ALT 53 06/10/2021 1114            No results found for: CHOL  Lab Results   Component Value Date    HDL 55 2022    HDL 71 2021     Lab Results   Component Value Date    LDLCALC 227 (H) 2022    LDLCALC 189 (H) 2021     Lab Results   Component Value Date    TRIG 89 2022    TRIG 59 2021     No results found for: CHOLHDL    IMAGING: No results found       Cardiac testing:   Results for orders placed during the hospital encounter of 21    Echo complete with contrast if indicated    Narrative  [place] [address], [facility]  [phone]    Transthoracic Echocardiogram  2D, M-mode, Doppler, and Color Doppler    Study date:  24-Mar-2021    Patient: Davion Kang  MR number: [id number]  Account number: [de-identified]  : 1945  Age: 67 years  Gender: Male  Status: Outpatient  Location: [address] [place] [address]  Height: 69 in  Weight: 149 6 lb  BP: 122/ 72 mmHg    Indications: Atrial fib SOB    Diagnoses: I48 0 - Atrial fibrillation    Sonographer:  RACH Fox  Referring Physician:  Santiago Camacho MD  Group:  [facility]  Interpreting Physician:  Mathew Smith DO    SUMMARY    PROCEDURE INFORMATION:  This was a technically difficult study  LEFT VENTRICLE:  Systolic function was normal  Ejection fraction was estimated to be 55 %  Although no diagnostic regional wall motion abnormality was identified, this possibility cannot be completely excluded on the basis of this study  RIGHT VENTRICLE:  The ventricle was mildly to moderately dilated  Systolic function was mildly reduced  Wall thickness was increased  LEFT ATRIUM:  The atrium was dilated  RIGHT ATRIUM:  The atrium was dilated  MITRAL VALVE:  There was moderate regurgitation  The regurgitant jet was centrally directed  AORTIC VALVE:  There was mild regurgitation  TRICUSPID VALVE:  There was moderate regurgitation  Estimated peak PA pressure was 41 mmHg  The findings suggest mild pulmonary hypertension  PULMONIC VALVE:  There was mild regurgitation  IVC, HEPATIC VEINS:  The inferior vena cava was mildly dilated  Respirophasic changes were blunted (less than 50% variation)  HISTORY: PRIOR HISTORY: HLD, hypothyroid    PROCEDURE: The study was performed in the [address] [place] [address]  This was a routine study  The transthoracic approach was used  The study included complete 2D imaging, M-mode, complete spectral Doppler, and color Doppler  The  heart rate was 71 bpm, at the start of the study  Images were obtained from the parasternal, apical, subcostal, and suprasternal notch acoustic windows  Echocardiographic views were limited due to poor acoustic window availability   This  was a technically difficult study     LEFT VENTRICLE: Size was normal  Systolic function was normal  Ejection fraction was estimated to be 55 %  Although no diagnostic regional wall motion abnormality was identified, this possibility cannot be completely excluded on the basis  of this study  Wall thickness was normal  DOPPLER: Normal sinus rhythm was absent  The study was not technically sufficient to allow evaluation of LV diastolic function  RIGHT VENTRICLE: The ventricle was mildly to moderately dilated  Systolic function was mildly reduced  Wall thickness was increased  LEFT ATRIUM: The atrium was dilated  RIGHT ATRIUM: The atrium was dilated  MITRAL VALVE: Valve structure was normal  There was normal leaflet separation  DOPPLER: The transmitral velocity was within the normal range  There was no evidence for stenosis  There was moderate regurgitation  The regurgitant jet was  centrally directed  AORTIC VALVE: The valve was trileaflet  Leaflets exhibited normal cuspal separation and sclerosis  DOPPLER: Transaortic velocity was within the normal range  There was no evidence for stenosis  There was mild regurgitation  TRICUSPID VALVE: The valve structure was normal  There was normal leaflet separation  DOPPLER: The transtricuspid velocity was within the normal range  There was no evidence for stenosis  There was moderate regurgitation  Pulmonary artery  systolic pressure was mildly increased  Estimated peak PA pressure was 41 mmHg  The findings suggest mild pulmonary hypertension  PULMONIC VALVE: Leaflets exhibited normal thickness, no calcification, and normal cuspal separation  DOPPLER: The transpulmonic velocity was within the normal range  There was mild regurgitation  PERICARDIUM: There was no pericardial effusion  The pericardium was normal in appearance  AORTA: The root exhibited normal size  SYSTEMIC VEINS: IVC: The inferior vena cava was mildly dilated   Respirophasic changes were blunted (less than 50% variation)  SYSTEM MEASUREMENT TABLES    2D  %FS: 32 74 %  Ao Diam: 3 49 cm  Ao asc: 3 54 cm  EDV(Teich): 101 42 ml  EF(Teich): 61 17 %  ESV(Teich): 39 38 ml  IVSd: 0 71 cm  LA Diam: 4 63 cm  LVIDd: 4 68 cm  LVIDs: 3 15 cm  LVPWd: 0 73 cm  SV(Teich): 62 04 ml    CW  AV MaxP 84 mmHg  AV Vmax: 0 68 m/s  PV Vmax: 0 75 m/s  PV maxP 25 mmHg  TR MaxP 6 mmHg  TR Vmax: 2 53 m/s    MM  TAPSE: 1 56 cm    PW  E' Sept: 0 1 m/s  E/E' Sept: 6 96  LVOT Vmax: 0 48 m/s  LVOT maxP 93 mmHg  MV Dec [place]: 5 16 m/s2  MV DecT: 135 86 ms  MV E [place]: 0 69 m/s  MV PHT: 39 4 ms  MVA By PHT: 5 58 cm2  RVOT Vmax: 0 5 m/s  RVOT maxP mmHg    [facility] Accredited Echocardiography Laboratory    Prepared and electronically signed by    Tiana Aviles DO  Signed 24-Mar-2021 09:01:07    Results for orders placed during the hospital encounter of 21    VENKATESH    Narrative  [facility] [address] [place] [id number]  [phone]    Transesophageal Echocardiogram  2D, Doppler, and Color Doppler    Study date:  2021    Patient: Arlene Lipoma  MR number: [id number]  Account number: [de-identified]  : 1945  Age: 67 years  Gender: Male  Status: Inpatient  Location: Cath lab  Height: 69 in  Weight: 148 lb  BP: 44865/ 72 mmHg    Indications: A-Fib    Diagnoses: I48 0 - Atrial fibrillation    Sonographer:  RACH Campos  Interpreting Physician:  Anais Grewal MD  Primary Physician:  Biju Goldman MD  Referring Physician:  Anais Grewal MD  Group:  [facility]    SUMMARY    LEFT VENTRICLE:  Systolic function was normal  Ejection fraction was estimated to be 55 %  LEFT ATRIUM:  The atrium was mildly dilated  LEFT ATRIAL APPENDAGE:  The size was normal   The function was mildly reduced (mildly reduced emptying velocity)  No thrombus was identified  ATRIAL SEPTUM:  No defect or patent foramen ovale was identified      MITRAL VALVE:  There was mild regurgitation  AORTIC VALVE:  There was trace regurgitation  TRICUSPID VALVE:  There was mild regurgitation  HISTORY: PRIOR HISTORY: HLD and Hypothyroidism    PROCEDURE: The procedure was performed in the catheterization laboratory  This was a routine study  The risks and alternatives of the procedure were explained to the patient and informed consent was obtained  The transesophageal approach  was used  The study included complete 2D imaging, limited spectral Doppler, and color Doppler  The heart rate was 55 bpm, at the start of the study  An adult omniplane probe was inserted by the attending cardiologist  Intubated with ease  One intubation attempt(s)  There was no blood detected on the probe  Image quality was good  There were no complications during the procedure  MEDICATIONS: Anesthesia administered by anesthesia team     LEFT VENTRICLE: Size was normal  Systolic function was normal  Ejection fraction was estimated to be 55 %  RIGHT VENTRICLE: The size was normal  Systolic function was normal  Wall thickness was normal     LEFT ATRIUM: The atrium was mildly dilated  No thrombus was identified  APPENDAGE: The size was normal  No thrombus was identified  DOPPLER: The function was mildly reduced (mildly reduced emptying velocity)  ATRIAL SEPTUM: No defect or patent foramen ovale was identified  RIGHT ATRIUM: Size was normal  No thrombus was identified  MITRAL VALVE: Valve structure was normal  There was normal leaflet separation  There was no echocardiographic evidence of vegetation  DOPPLER: There was mild regurgitation  AORTIC VALVE: The valve was trileaflet  Leaflets exhibited normal thickness and normal cuspal separation  There was no echocardiographic evidence of vegetation  DOPPLER: There was trace regurgitation  TRICUSPID VALVE: The valve structure was normal  There was normal leaflet separation  There was no echocardiographic evidence of vegetation  DOPPLER: There was mild regurgitation  PERICARDIUM: There was no pericardial effusion  The pericardium was normal in appearance  [address] Accredited Echocardiography Laboratory    Prepared and electronically signed by    Andrew Man MD  Signed 29-Jun-2021 14:24:23    No results found for this or any previous visit  No results found for this or any previous visit  I reviewed and interpreted the following LABS/EKG/TELE/IMAGING and below is summary of my interpretation (if data available):    LABS:bpm    Current EKG and Rhythm Strip: AV pacing w narrow QRS  PPM check no afib

## 2022-11-30 ENCOUNTER — RA CDI HCC (OUTPATIENT)
Dept: OTHER | Facility: HOSPITAL | Age: 77
End: 2022-11-30

## 2022-11-30 NOTE — PROGRESS NOTES
[facility]  coding opportunities       Chart reviewed, no opportunity found: CHART REVIEWED, NO OPPORTUNITY FOUND     No HTN     Patients Insurance     Medicare Insurance: [employer]

## 2022-12-06 ENCOUNTER — OFFICE VISIT (OUTPATIENT)
Dept: FAMILY MEDICINE CLINIC | Facility: CLINIC | Age: 77
End: 2022-12-06

## 2022-12-06 VITALS
OXYGEN SATURATION: 96 % | HEART RATE: 74 BPM | SYSTOLIC BLOOD PRESSURE: 122 MMHG | RESPIRATION RATE: 16 BRPM | DIASTOLIC BLOOD PRESSURE: 78 MMHG | HEIGHT: 69 IN | WEIGHT: 146 LBS | BODY MASS INDEX: 21.62 KG/M2

## 2022-12-06 DIAGNOSIS — I48.19 PERSISTENT ATRIAL FIBRILLATION (HCC): ICD-10-CM

## 2022-12-06 DIAGNOSIS — E03.9 ACQUIRED HYPOTHYROIDISM: ICD-10-CM

## 2022-12-06 DIAGNOSIS — E78.2 MIXED HYPERLIPIDEMIA: ICD-10-CM

## 2022-12-06 DIAGNOSIS — Z23 NEED FOR VACCINATION: Primary | ICD-10-CM

## 2022-12-06 DIAGNOSIS — Z78.9 STATIN INTOLERANCE: ICD-10-CM

## 2022-12-06 NOTE — PROGRESS NOTES
Assessment/Plan:    1  Need for vaccination  -     influenza vaccine, high-dose, PF 0 7 mL (FLUZONE HIGH-DOSE)    2  Acquired hypothyroidism  -     TSH, 3rd generation with Free T4 reflex; Future    3  Mixed hyperlipidemia  -     Comprehensive metabolic panel; Future  -     CBC and differential; Future  -     Lipid Panel with Direct LDL reflex; Future    4  Statin intolerance    5  Persistent atrial fibrillation (HCC)  -     TSH, 3rd generation with Free T4 reflex; Future  -     Magnesium; Future   things are going well   Just the cough still  And mucinex and tyelnol is fine enough     Checking labs this time   And he didn't look into the nexlizet or repatha   Nor does he want too   He will follow up with cardio and uro      Subjective:      Patient ID: Melissa Duenas Sr  is a 65 y o  male  HPI   Here to go over chronic issues and labs / imaging studies if applicable  Getting over cold sx   Had suspected flu back in oct     Still with phlem cough at night but fine all day         The following portions of the patient's history were reviewed and updated as appropriate: allergies, current medications, past family history, past medical history, past social history, past surgical history and problem list     Review of Systems   Constitutional: Negative for activity change, appetite change and fever  HENT: Negative for congestion, nosebleeds and trouble swallowing  Eyes: Negative for itching  Respiratory: Positive for cough  Negative for chest tightness  Cardiovascular: Negative for chest pain and palpitations  Gastrointestinal: Negative for abdominal pain, constipation, diarrhea and nausea  Endocrine: Negative for cold intolerance  Genitourinary: Negative for frequency  Musculoskeletal: Negative for gait problem and joint swelling  Skin: Negative for rash  Allergic/Immunologic: Negative for immunocompromised state     Neurological: Negative for dizziness, tremors, seizures, syncope and headaches  Psychiatric/Behavioral: Negative for hallucinations and suicidal ideas  Objective:      /78 (BP Location: Right arm, Patient Position: Sitting, Cuff Size: Large)   Pulse 74   Resp 16   Ht 5' 9" (1 753 m)   Wt 66 2 kg (146 lb)   SpO2 96%   BMI 21 56 kg/m²     No visits with results within 2 Week(s) from this visit     Latest known visit with results is:   Appointment on 01/12/2022   Component Date Value   • Cholesterol 01/12/2022 300 (H)    • Triglycerides 01/12/2022 89    • HDL, Direct 01/12/2022 55    • LDL Calculated 01/12/2022 227 (H)    • WBC 01/12/2022 5 49    • RBC 01/12/2022 4 10    • Hemoglobin 01/12/2022 13 7    • Hematocrit 01/12/2022 40 9    • MCV 01/12/2022 100 (H)    • MCH 01/12/2022 33 4    • MCHC 01/12/2022 33 5    • RDW 01/12/2022 12 9    • MPV 01/12/2022 9 3    • Platelets 03/54/0157 202    • nRBC 01/12/2022 0    • Neutrophils Relative 01/12/2022 61    • Immat GRANS % 01/12/2022 0    • Lymphocytes Relative 01/12/2022 25    • Monocytes Relative 01/12/2022 12    • Eosinophils Relative 01/12/2022 2    • Basophils Relative 01/12/2022 0    • Neutrophils Absolute 01/12/2022 3 34    • Immature Grans Absolute 01/12/2022 0 01    • Lymphocytes Absolute 01/12/2022 1 35    • Monocytes Absolute 01/12/2022 0 68    • Eosinophils Absolute 01/12/2022 0 09    • Basophils Absolute 01/12/2022 0 02    • Sodium 01/12/2022 135 (L)    • Potassium 01/12/2022 4 5    • Chloride 01/12/2022 101    • CO2 01/12/2022 32    • ANION GAP 01/12/2022 2 (L)    • BUN 01/12/2022 28 (H)    • Creatinine 01/12/2022 1 02    • Glucose, Fasting 01/12/2022 88    • Calcium 01/12/2022 10 0    • eGFR 01/12/2022 70    • TSH 3RD GENERATON 01/12/2022 1 040    • Vit D, 25-Hydroxy 01/12/2022 50 0    • Hepatitis C Ab 01/12/2022 Non-reactive    • Sodium 06/15/2022 137    • Potassium 06/15/2022 4 3    • Chloride 06/15/2022 102    • CO2 06/15/2022 32    • ANION GAP 06/15/2022 3 (L)    • BUN 06/15/2022 25    • Creatinine 06/15/2022 1 01    • Glucose, Fasting 06/15/2022 95    • Calcium 06/15/2022 9 1    • eGFR 06/15/2022 70           Physical Exam  Vitals and nursing note reviewed  Constitutional:       General: He is not in acute distress  Appearance: He is well-developed and well-nourished  HENT:      Head: Normocephalic and atraumatic  Eyes:      Extraocular Movements: EOM normal    Cardiovascular:      Rate and Rhythm: Normal rate and regular rhythm  Pulses: Intact distal pulses  Heart sounds: Normal heart sounds  No murmur heard  Pulmonary:      Effort: Pulmonary effort is normal       Breath sounds: Normal breath sounds  No wheezing or rales  Abdominal:      General: Bowel sounds are normal  There is no distension  Palpations: Abdomen is soft  Tenderness: There is no abdominal tenderness  There is no guarding or rebound  Musculoskeletal:         General: No tenderness or edema  Normal range of motion  Cervical back: Normal range of motion and neck supple  Lymphadenopathy:      Cervical: No cervical adenopathy  Skin:     General: Skin is warm and dry  Capillary Refill: Capillary refill takes less than 2 seconds  Findings: No rash  Neurological:      Mental Status: He is alert and oriented to person, place, and time  Cranial Nerves: No cranial nerve deficit  Sensory: No sensory deficit  Motor: No abnormal muscle tone  Psychiatric:         Mood and Affect: Mood and affect normal          Behavior: Behavior normal          Thought Content:  Thought content normal          Judgment: Judgment normal              Roxy Martin MD  [place] [id number]

## 2022-12-11 PROBLEM — J06.9 ACUTE UPPER RESPIRATORY INFECTION: Status: RESOLVED | Noted: 2022-10-12 | Resolved: 2022-12-11

## 2022-12-12 ENCOUNTER — HOSPITAL ENCOUNTER (OUTPATIENT)
Dept: ULTRASOUND IMAGING | Facility: HOSPITAL | Age: 77
Discharge: HOME | End: 2022-12-12

## 2022-12-12 ENCOUNTER — APPOINTMENT (OUTPATIENT)
Dept: LAB | Facility: CLINIC | Age: 77
End: 2022-12-12

## 2022-12-12 DIAGNOSIS — C64.2 MALIGNANT NEOPLASM OF LEFT KIDNEY, EXCEPT RENAL PELVIS (HCC): ICD-10-CM

## 2022-12-12 DIAGNOSIS — E78.2 MIXED HYPERLIPIDEMIA: ICD-10-CM

## 2022-12-12 DIAGNOSIS — I48.19 PERSISTENT ATRIAL FIBRILLATION (HCC): ICD-10-CM

## 2022-12-12 DIAGNOSIS — E03.9 ACQUIRED HYPOTHYROIDISM: ICD-10-CM

## 2022-12-12 LAB
ALBUMIN SERPL BCP-MCNC: 4.2 G/DL (ref 3.5–5)
ALP SERPL-CCNC: 102 U/L (ref 34–104)
ALT SERPL W P-5'-P-CCNC: 44 U/L (ref 7–52)
ANION GAP SERPL CALCULATED.3IONS-SCNC: 6 MMOL/L (ref 4–13)
AST SERPL W P-5'-P-CCNC: 28 U/L (ref 13–39)
BASOPHILS # BLD AUTO: 0.03 THOUSANDS/ÂΜL (ref 0–0.1)
BASOPHILS NFR BLD AUTO: 1 % (ref 0–1)
BILIRUB SERPL-MCNC: 0.48 MG/DL (ref 0.2–1)
BUN SERPL-MCNC: 19 MG/DL (ref 5–25)
CALCIUM SERPL-MCNC: 9.5 MG/DL (ref 8.4–10.2)
CHLORIDE SERPL-SCNC: 97 MMOL/L (ref 96–108)
CHOLEST SERPL-MCNC: 242 MG/DL
CO2 SERPL-SCNC: 32 MMOL/L (ref 21–32)
CREAT SERPL-MCNC: 0.87 MG/DL (ref 0.6–1.3)
EOSINOPHIL # BLD AUTO: 0.31 THOUSAND/ÂΜL (ref 0–0.61)
EOSINOPHIL NFR BLD AUTO: 7 % (ref 0–6)
ERYTHROCYTE [DISTWIDTH] IN BLOOD BY AUTOMATED COUNT: 13.4 % (ref 11.6–15.1)
GFR SERPL CREATININE-BSD FRML MDRD: 82 ML/MIN/1.73SQ M
GLUCOSE P FAST SERPL-MCNC: 88 MG/DL (ref 65–99)
HCT VFR BLD AUTO: 39 % (ref 36.5–49.3)
HDLC SERPL-MCNC: 44 MG/DL
HGB BLD-MCNC: 12.4 G/DL (ref 12–17)
IMM GRANULOCYTES # BLD AUTO: 0.02 THOUSAND/UL (ref 0–0.2)
IMM GRANULOCYTES NFR BLD AUTO: 0 % (ref 0–2)
LDLC SERPL CALC-MCNC: 178 MG/DL (ref 0–100)
LYMPHOCYTES # BLD AUTO: 1.13 THOUSANDS/ÂΜL (ref 0.6–4.47)
LYMPHOCYTES NFR BLD AUTO: 25 % (ref 14–44)
MAGNESIUM SERPL-MCNC: 1.9 MG/DL (ref 1.9–2.7)
MCH RBC QN AUTO: 31.9 PG (ref 26.8–34.3)
MCHC RBC AUTO-ENTMCNC: 31.8 G/DL (ref 31.4–37.4)
MCV RBC AUTO: 100 FL (ref 82–98)
MONOCYTES # BLD AUTO: 0.61 THOUSAND/ÂΜL (ref 0.17–1.22)
MONOCYTES NFR BLD AUTO: 13 % (ref 4–12)
NEUTROPHILS # BLD AUTO: 2.48 THOUSANDS/ÂΜL (ref 1.85–7.62)
NEUTS SEG NFR BLD AUTO: 54 % (ref 43–75)
NRBC BLD AUTO-RTO: 0 /100 WBCS
PLATELET # BLD AUTO: 264 THOUSANDS/UL (ref 149–390)
PMV BLD AUTO: 9 FL (ref 8.9–12.7)
POTASSIUM SERPL-SCNC: 4.1 MMOL/L (ref 3.5–5.3)
PROT SERPL-MCNC: 7.9 G/DL (ref 6.4–8.4)
RBC # BLD AUTO: 3.89 MILLION/UL (ref 3.88–5.62)
SODIUM SERPL-SCNC: 135 MMOL/L (ref 135–147)
TRIGL SERPL-MCNC: 102 MG/DL
TSH SERPL DL<=0.05 MIU/L-ACNC: 1.56 UIU/ML (ref 0.45–4.5)
WBC # BLD AUTO: 4.58 THOUSAND/UL (ref 4.31–10.16)

## 2022-12-13 ENCOUNTER — TELEPHONE (OUTPATIENT)
Dept: FAMILY MEDICINE CLINIC | Facility: CLINIC | Age: 77
End: 2022-12-13

## 2022-12-13 NOTE — TELEPHONE ENCOUNTER
----- Message from Gagan Shipman MD sent at 12/12/2022 12:57 PM EST -----  Everything looks pretty stable of course other than the cholesterol which we have discussed no changes at this time

## 2023-01-03 ENCOUNTER — TELEPHONE (OUTPATIENT)
Dept: CARDIOLOGY CLINIC | Facility: CLINIC | Age: 78
End: 2023-01-03

## 2023-01-04 ENCOUNTER — OFFICE VISIT (OUTPATIENT)
Dept: UROLOGY | Facility: AMBULATORY SURGERY CENTER | Age: 78
End: 2023-01-04

## 2023-01-04 VITALS
SYSTOLIC BLOOD PRESSURE: 110 MMHG | WEIGHT: 146 LBS | HEART RATE: 66 BPM | BODY MASS INDEX: 21.62 KG/M2 | DIASTOLIC BLOOD PRESSURE: 68 MMHG | HEIGHT: 69 IN | OXYGEN SATURATION: 96 %

## 2023-01-04 DIAGNOSIS — C64.2 MALIGNANT NEOPLASM OF LEFT KIDNEY, EXCEPT RENAL PELVIS (HCC): Primary | ICD-10-CM

## 2023-01-05 DIAGNOSIS — I48.19 PERSISTENT ATRIAL FIBRILLATION (HCC): ICD-10-CM

## 2023-01-05 DIAGNOSIS — I48.20 CHRONIC ATRIAL FIBRILLATION (HCC): ICD-10-CM

## 2023-01-05 RX ORDER — SOTALOL HYDROCHLORIDE 80 MG/1
80 TABLET ORAL EVERY 12 HOURS SCHEDULED
Qty: 180 TABLET | Refills: 3 | Status: SHIPPED | OUTPATIENT
Start: 2023-01-05

## 2023-01-27 ENCOUNTER — REMOTE DEVICE CLINIC VISIT (OUTPATIENT)
Dept: CARDIOLOGY CLINIC | Facility: CLINIC | Age: 78
End: 2023-01-27

## 2023-01-27 DIAGNOSIS — Z95.0 PRESENCE OF CARDIAC PACEMAKER: Primary | ICD-10-CM

## 2023-01-27 NOTE — PROGRESS NOTES
Results for orders placed or performed in visit on 01/27/23   Cardiac EP device report    Narrative    MDT DUAL PM/ ACTIVE SYSTEM IS MRI CONDITIONAL  CARELINK TRANSMISSION: BATTERY VOLTAGE ADEQUATE (10 4 YRS)  AP: 69 6%  : 96 7% (>40%~DDDR/60)  ALL AVAILABLE LEAD PARAMETERS WITHIN NORMAL LIMITS  1 AT/AF EPISODE W/ EGM SHOWING AF, DURATION 1 HR 44 MINS  PT TAKES XARELTO, SOTALOL  AF BURDEN: <0 1%  PACEMAKER FUNCTIONING APPROPRIATELY   [address] [place] [address]

## 2023-01-31 ENCOUNTER — OFFICE VISIT (OUTPATIENT)
Dept: FAMILY MEDICINE CLINIC | Facility: CLINIC | Age: 78
End: 2023-01-31

## 2023-01-31 VITALS
OXYGEN SATURATION: 97 % | RESPIRATION RATE: 16 BRPM | BODY MASS INDEX: 21.48 KG/M2 | WEIGHT: 145 LBS | HEIGHT: 69 IN | DIASTOLIC BLOOD PRESSURE: 80 MMHG | SYSTOLIC BLOOD PRESSURE: 130 MMHG | HEART RATE: 84 BPM

## 2023-01-31 DIAGNOSIS — J01.90 ACUTE NON-RECURRENT SINUSITIS, UNSPECIFIED LOCATION: Primary | ICD-10-CM

## 2023-01-31 RX ORDER — FLUTICASONE PROPIONATE 50 MCG
1 SPRAY, SUSPENSION (ML) NASAL DAILY
Qty: 16 ML | Refills: 0 | Status: SHIPPED | OUTPATIENT
Start: 2023-01-31

## 2023-01-31 RX ORDER — CEFUROXIME AXETIL 500 MG/1
500 TABLET ORAL EVERY 12 HOURS SCHEDULED
Qty: 20 TABLET | Refills: 0 | Status: SHIPPED | OUTPATIENT
Start: 2023-01-31 | End: 2023-02-10

## 2023-01-31 NOTE — PROGRESS NOTES
Assessment/Plan:    1  Acute non-recurrent sinusitis, unspecified location  -     fluticasone (FLONASE) 50 mcg/act nasal spray; 1 spray into each nostril daily  -     cefuroxime (CEFTIN) 500 mg tablet; Take 1 tablet (500 mg total) by mouth every 12 (twelve) hours for 10 days       Subjective:      Patient ID: Laila Rojas  is a 77 y o  male  HPI    Eating breakfast causes the cough productive   Couple weeks now   Started in the head then moving down     Using tussin     The following portions of the patient's history were reviewed and updated as appropriate: allergies, current medications, past family history, past medical history, past social history, past surgical history and problem list     Review of Systems   All other systems reviewed and are negative  Objective:      /80 (BP Location: Left arm, Patient Position: Sitting, Cuff Size: Standard)   Pulse 84   Resp 16   Ht 5' 9" (1 753 m)   Wt 65 8 kg (145 lb)   SpO2 97%   BMI 21 41 kg/m²     No visits with results within 2 Week(s) from this visit     Latest known visit with results is:   Appointment on 12/12/2022   Component Date Value   • TSH 3RD GENERATON 12/12/2022 1 564    • Sodium 12/12/2022 135    • Potassium 12/12/2022 4 1    • Chloride 12/12/2022 97    • CO2 12/12/2022 32    • ANION GAP 12/12/2022 6    • BUN 12/12/2022 19    • Creatinine 12/12/2022 0 87    • Glucose, Fasting 12/12/2022 88    • Calcium 12/12/2022 9 5    • AST 12/12/2022 28    • ALT 12/12/2022 44    • Alkaline Phosphatase 12/12/2022 102    • Total Protein 12/12/2022 7 9    • Albumin 12/12/2022 4 2    • Total Bilirubin 12/12/2022 0 48    • eGFR 12/12/2022 82    • WBC 12/12/2022 4 58    • RBC 12/12/2022 3 89    • Hemoglobin 12/12/2022 12 4    • Hematocrit 12/12/2022 39 0    • MCV 12/12/2022 100 (H)    • MCH 12/12/2022 31 9    • MCHC 12/12/2022 31 8    • RDW 12/12/2022 13 4    • MPV 12/12/2022 9 0    • Platelets 71/48/2584 264    • nRBC 12/12/2022 0    • Neutrophils Relative 12/12/2022 54    • Immat GRANS % 12/12/2022 0    • Lymphocytes Relative 12/12/2022 25    • Monocytes Relative 12/12/2022 13 (H)    • Eosinophils Relative 12/12/2022 7 (H)    • Basophils Relative 12/12/2022 1    • Neutrophils Absolute 12/12/2022 2 48    • Immature Grans Absolute 12/12/2022 0 02    • Lymphocytes Absolute 12/12/2022 1 13    • Monocytes Absolute 12/12/2022 0 61    • Eosinophils Absolute 12/12/2022 0 31    • Basophils Absolute 12/12/2022 0 03    • Cholesterol 12/12/2022 242 (H)    • Triglycerides 12/12/2022 102    • HDL, Direct 12/12/2022 44    • LDL Calculated 12/12/2022 178 (H)    • Magnesium 12/12/2022 1 9           Physical Exam  Vitals and nursing note reviewed  Constitutional:       Appearance: He is well-developed  HENT:      Head: Normocephalic and atraumatic  Right Ear: A middle ear effusion is present  Left Ear: A middle ear effusion is present  Nose:      Right Sinus: Maxillary sinus tenderness and frontal sinus tenderness present  Left Sinus: Maxillary sinus tenderness and frontal sinus tenderness present  Mouth/Throat:      Mouth: Mucous membranes are pale  Cardiovascular:      Rate and Rhythm: Normal rate and regular rhythm  Heart sounds: Normal heart sounds  Pulmonary:      Effort: Pulmonary effort is normal       Breath sounds: Normal breath sounds  Skin:     General: Skin is warm and dry  Capillary Refill: Capillary refill takes less than 2 seconds  Neurological:      Mental Status: He is alert and oriented to person, place, and time  Psychiatric:         Behavior: Behavior normal          Thought Content:  Thought content normal          Judgment: Judgment normal              Layla Mix MD  [place] [id number]

## 2023-03-02 ENCOUNTER — OFFICE VISIT (OUTPATIENT)
Dept: FAMILY MEDICINE CLINIC | Facility: CLINIC | Age: 78
End: 2023-03-02

## 2023-03-02 VITALS
HEIGHT: 69 IN | HEART RATE: 61 BPM | OXYGEN SATURATION: 100 % | RESPIRATION RATE: 16 BRPM | BODY MASS INDEX: 21.77 KG/M2 | WEIGHT: 147 LBS | SYSTOLIC BLOOD PRESSURE: 124 MMHG | DIASTOLIC BLOOD PRESSURE: 80 MMHG

## 2023-03-02 DIAGNOSIS — I50.32 CHRONIC DIASTOLIC (CONGESTIVE) HEART FAILURE (HCC): ICD-10-CM

## 2023-03-02 DIAGNOSIS — R05.3 PERSISTENT COUGH: ICD-10-CM

## 2023-03-02 DIAGNOSIS — I48.19 PERSISTENT ATRIAL FIBRILLATION (HCC): ICD-10-CM

## 2023-03-02 DIAGNOSIS — B02.8 HERPES ZOSTER WITH COMPLICATION: Primary | ICD-10-CM

## 2023-03-02 RX ORDER — GABAPENTIN 100 MG/1
100 CAPSULE ORAL 3 TIMES DAILY
Qty: 30 CAPSULE | Refills: 0 | Status: SHIPPED | OUTPATIENT
Start: 2023-03-02

## 2023-03-02 RX ORDER — VALACYCLOVIR HYDROCHLORIDE 1 G/1
1000 TABLET, FILM COATED ORAL 3 TIMES DAILY
Qty: 21 TABLET | Refills: 0 | Status: SHIPPED | OUTPATIENT
Start: 2023-03-02 | End: 2023-03-09

## 2023-03-02 NOTE — PROGRESS NOTES
Assessment/Plan:    Thought about adding in orapred for him   But with the active shingles I dont want to use a steroid     There should not be any interaction with his brian or valtrex with his heart meds      I picked brian over amitrypiline due to current sotolo use         1  Herpes zoster with complication  -     valACYclovir (VALTREX) 1,000 mg tablet; Take 1 tablet (1,000 mg total) by mouth 3 (three) times a day for 7 days  -     gabapentin (Neurontin) 100 mg capsule; Take 1 capsule (100 mg total) by mouth 3 (three) times a day    2  Chronic diastolic (congestive) heart failure (HCC)    3  Persistent atrial fibrillation (HCC)    4  Persistent cough       Subjective:      Patient ID: Abdifatah Yee Sr  is a 77 y o  male  HPI   Couple days ago with rash and painful  Tried triple ointment      The following portions of the patient's history were reviewed and updated as appropriate: allergies, current medications, past family history, past medical history, past social history, past surgical history and problem list     Review of Systems   Constitutional: Negative for activity change, appetite change and fever  HENT: Negative for congestion, nosebleeds and trouble swallowing  Eyes: Negative for itching  Respiratory: Positive for cough  Negative for chest tightness  Cardiovascular: Negative for chest pain and palpitations  Gastrointestinal: Negative for abdominal pain, constipation, diarrhea and nausea  Endocrine: Negative for cold intolerance  Genitourinary: Negative for frequency  Musculoskeletal: Negative for gait problem and joint swelling  Skin: Positive for rash  Allergic/Immunologic: Negative for immunocompromised state  Neurological: Negative for dizziness, tremors, seizures, syncope and headaches  Psychiatric/Behavioral: Negative for hallucinations and suicidal ideas           Objective:      /80 (BP Location: Left arm, Patient Position: Sitting, Cuff Size: Standard) Pulse 61   Resp 16   Ht 5' 9" (1 753 m)   Wt 66 7 kg (147 lb)   SpO2 100%   BMI 21 71 kg/m²     No visits with results within 2 Week(s) from this visit  Latest known visit with results is:   Appointment on 12/12/2022   Component Date Value   • TSH 3RD GENERATON 12/12/2022 1 564    • Sodium 12/12/2022 135    • Potassium 12/12/2022 4 1    • Chloride 12/12/2022 97    • CO2 12/12/2022 32    • ANION GAP 12/12/2022 6    • BUN 12/12/2022 19    • Creatinine 12/12/2022 0 87    • Glucose, Fasting 12/12/2022 88    • Calcium 12/12/2022 9 5    • AST 12/12/2022 28    • ALT 12/12/2022 44    • Alkaline Phosphatase 12/12/2022 102    • Total Protein 12/12/2022 7 9    • Albumin 12/12/2022 4 2    • Total Bilirubin 12/12/2022 0 48    • eGFR 12/12/2022 82    • WBC 12/12/2022 4 58    • RBC 12/12/2022 3 89    • Hemoglobin 12/12/2022 12 4    • Hematocrit 12/12/2022 39 0    • MCV 12/12/2022 100 (H)    • MCH 12/12/2022 31 9    • MCHC 12/12/2022 31 8    • RDW 12/12/2022 13 4    • MPV 12/12/2022 9 0    • Platelets 00/82/6098 264    • nRBC 12/12/2022 0    • Neutrophils Relative 12/12/2022 54    • Immat GRANS % 12/12/2022 0    • Lymphocytes Relative 12/12/2022 25    • Monocytes Relative 12/12/2022 13 (H)    • Eosinophils Relative 12/12/2022 7 (H)    • Basophils Relative 12/12/2022 1    • Neutrophils Absolute 12/12/2022 2 48    • Immature Grans Absolute 12/12/2022 0 02    • Lymphocytes Absolute 12/12/2022 1 13    • Monocytes Absolute 12/12/2022 0 61    • Eosinophils Absolute 12/12/2022 0 31    • Basophils Absolute 12/12/2022 0 03    • Cholesterol 12/12/2022 242 (H)    • Triglycerides 12/12/2022 102    • HDL, Direct 12/12/2022 44    • LDL Calculated 12/12/2022 178 (H)    • Magnesium 12/12/2022 1 9           Physical Exam  Constitutional:       Appearance: He is well-developed  HENT:      Head: Normocephalic and atraumatic  Pulmonary:      Effort: Pulmonary effort is normal    Musculoskeletal:      Cervical back: Normal range of motion  Neurological:      Mental Status: He is alert and oriented to person, place, and time  Psychiatric:         Behavior: Behavior normal          Thought Content:  Thought content normal          Judgment: Judgment normal                      MD Donna Murguia

## 2023-04-03 DIAGNOSIS — E03.9 ACQUIRED HYPOTHYROIDISM: ICD-10-CM

## 2023-04-03 RX ORDER — LEVOTHYROXINE SODIUM 0.07 MG/1
75 TABLET ORAL
Qty: 90 TABLET | Refills: 1 | Status: SHIPPED | OUTPATIENT
Start: 2023-04-03

## 2023-04-28 ENCOUNTER — REMOTE DEVICE CLINIC VISIT (OUTPATIENT)
Dept: CARDIOLOGY CLINIC | Facility: CLINIC | Age: 78
End: 2023-04-28

## 2023-04-28 DIAGNOSIS — Z95.0 PRESENCE OF CARDIAC PACEMAKER: Primary | ICD-10-CM

## 2023-04-28 NOTE — PROGRESS NOTES
Results for orders placed or performed in visit on 04/28/23   Cardiac EP device report    Narrative    MDT DUAL PM/ ACTIVE SYSTEM IS MRI CONDITIONAL  CARELINK TRANSMISSION: BATTERY VOLTAGE ADEQUATE (10 1 YRS)  AP: 66 4%  : 97 4% (>40%~MVP-OFF~DDDR/60)  ALL AVAILABLE LEAD PARAMETERS WITHIN NORMAL LIMITS  1 AT/AF EPISODE W/ EGM SHOWING AF, DURATION 8 HRS 41 MINS  AF BURDEN: 0 4%  PT TAKES XARELTO, SOTALOL  EF: 55% (ECHO 6/29/21)  PACEMAKER FUNCTIONING APPROPRIATELY    [address] [place] [address]

## 2023-04-30 NOTE — PROGRESS NOTES
1/4/2023      Assessment and Plan    65 y o  male managed by Dr Noemy German    1  Renal Cell Carcinoma  · Status post cryoablation 6/2020  · Ultrasound of kidney and bladder performed 12/12/2022 with no concerns for recurrence  · Renal function unremarkable  · CT renal study and chest x-ray and BMP to be performed in 1 year  · Follow-up in the office in 1 year    2  Urinary urgency/urge incontinence  · Stable  · Continue with dietary and behavioral modifications to reduce irritative symptoms  · Continue Kegel/pelvic floor exercises    History of Present Illness  Jhonatan Urena Sr  is a 65 y o  male here for follow up evaluation of  left papillary renal cell carcinoma   Patient underwent percutaneous biopsy of left renal mass 06/2020 along with cryoablation   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   Patient reports to the office today for surveillance follow-up   Ultrasound of his kidney and bladder was performed 12/06/2021 with unremarkable findings  [facility] reports doing well since his last office evaluation  Robin [place] does report a mild increasing urinary urgency/urge incontinence and postvoid dribbling   He denies suprapubic abdominal pain and flank pain   He denies hematuria  Component       BUN Creatinine   Latest Ref Rng & Units       5 - 25 mg/dL 0 60 - 1 30 mg/dL   6/30/2021      5:52 AM 23 0 92   7/1/2021      6:39 AM 18 0 94   1/12/2022      10:01 AM 28 (H) 1 02   6/15/2022      7:18 AM 25 1 01   12/12/2022      9:01 AM 19 0 87       Review of Systems   Constitutional: Negative for chills and fever  Respiratory: Negative for cough and shortness of breath  Cardiovascular: Negative for chest pain  Gastrointestinal: Negative for abdominal distention, abdominal pain, blood in stool, nausea and vomiting  Genitourinary: Negative for difficulty urinating, dysuria, enuresis, flank pain, frequency, hematuria and urgency  Skin: Negative for rash       Past Medical History  Past Medical History:   Diagnosis Date   • Dyslipidemia    • Erectile dysfunction of non-organic origin    • Gastroesophageal reflux    • Hyperlipidemia    • Hypothyroidism    • Liver disease    • Malignant neoplasm of kidney (HCC)     Left   • Malignant neoplasm of left kidney, except renal pelvis (HCC)    • Pure hypercholesterolemia    • Thyroid disease    • Vitamin D deficiency, unspecified        Past Social History  Past Surgical History:   Procedure Laterality Date   • CATARACT EXTRACTION Bilateral 2014   • CYSTOSCOPY     • EGD AND COLONOSCOPY  12/15/2009   • HERNIA REPAIR Left     Inguinal   • URETHRA SURGERY  05/2010    Done by Dr Pee Reynolds History     Tobacco Use   Smoking Status Former   Smokeless Tobacco Never   Tobacco Comments    quit at age 23       Past Family History  Family History   Problem Relation Age of Onset   • Cancer Mother         bladder   • Heart disease Mother    • Prostate cancer Father    • Stroke Father    • Heart disease Father    • Hyperlipidemia Family        Past Social history  Social History     Socioeconomic History   • Marital status: /Civil Union     Spouse name: Not on file   • Number of children: Not on file   • Years of education: Not on file   • Highest education level: Not on file   Occupational History   • Not on file   Tobacco Use   • Smoking status: Former   • Smokeless tobacco: Never   • Tobacco comments:     quit at age 23   Vaping Use   • Vaping Use: Former   Substance and Sexual Activity   • Alcohol use: Not Currently   • Drug use: No   • Sexual activity: Not on file   Other Topics Concern   • Not on file   Social History Narrative   • Not on file     Social Determinants of Health     Financial Resource Strain: Not on file   Food Insecurity: Not on file   Transportation Needs: Not on file   Physical Activity: Not on file   Stress: Not on file   Social Connections: Not on file   Intimate Partner Violence: Not on file   Housing Stability: Not on file Current Medications  Current Outpatient Medications   Medication Sig Dispense Refill   • Ascorbic Acid (vitamin C) 1000 MG tablet Take 1,000 mg by mouth daily     • cholecalciferol (VITAMIN D3) 1,000 units tablet Take 1,000 Units by mouth daily     • Coenzyme Q10 (CoQ10) 100 MG CAPS Take 2 capsules (200 mg total) by mouth daily 60 capsule 0   • levothyroxine 75 mcg tablet Take 1 tablet (75 mcg total) by mouth daily in the early morning 90 tablet 1   • magnesium oxide (MAG-OX) 400 mg Take 400 mg by mouth daily     • melatonin 3 mg Take by mouth daily at bedtime     • potassium chloride (MICRO-K) 10 MEQ CR capsule Take 2 capsules (20 mEq total) by mouth daily 180 capsule 3   • pravastatin (PRAVACHOL) 10 mg tablet Take 1 tablet (10 mg total) by mouth daily 30 tablet 3   • Probiotic Product (CULTURELLE PROBIOTICS PO) Take by mouth     • rivaroxaban (XARELTO) 20 mg tablet Take 1 tablet (20 mg total) by mouth daily after dinner 90 tablet 0   • sotalol (BETAPACE) 80 mg tablet Take 1 tablet (80 mg total) by mouth every 12 (twelve) hours 90 tablet 0   • vitamin B-12 (VITAMIN B-12) 1,000 mcg tablet Take by mouth daily     • furosemide (LASIX) 20 mg tablet Take 1 tablet (20 mg total) by mouth daily as needed (for edema/weight gain) (Patient not taking: Reported on 11/7/2022) 90 tablet 0   • ketoconazole (NIZORAL) 2 % cream APPLY TO AFFECTED AREA(S) ON LEFT HAND AND FEET TWO TIMES DAILY (Patient not taking: Reported on 11/7/2022)     • Multiple Vitamins-Minerals (MULTIVITAMIN ADULT PO) Take by mouth every other day  (Patient not taking: Reported on 11/7/2022)       No current facility-administered medications for this visit         Allergies  Allergies   Allergen Reactions   • Atorvastatin      Muscle pain   • Crestor [Rosuvastatin] Dizziness   • Vytorin [Ezetimibe-Simvastatin] Dizziness         The following portions of the patient's history were reviewed and updated as appropriate: allergies, current medications, past medical history, past social history, past surgical history and problem list       Vitals  Vitals:    01/04/23 0927   BP: 110/68   BP Location: Left arm   Patient Position: Sitting   Cuff Size: Adult   Pulse: 66   SpO2: 96%   Weight: 66 2 kg (146 lb)   Height: 5' 9" (1 753 m)           Physical Exam  Physical Exam  Vitals reviewed  Constitutional:       General: He is not in acute distress  Appearance: Normal appearance  He is normal weight  HENT:      Head: Normocephalic  Cardiovascular:      Rate and Rhythm: Normal rate and regular rhythm  Heart sounds: Normal heart sounds  Pulmonary:      Effort: No respiratory distress  Breath sounds: Normal breath sounds  Skin:     General: Skin is warm and dry  Neurological:      General: No focal deficit present  Mental Status: He is alert and oriented to person, place, and time  Psychiatric:         Mood and Affect: Mood normal          Behavior: Behavior normal        Results  No results found for this or any previous visit (from the past 1 hour(s)) ]  Lab Results   Component Value Date    PSA 0 6 12/02/2019    PSA 0 5 09/23/2016     Lab Results   Component Value Date    CALCIUM 9 5 12/12/2022    K 4 1 12/12/2022    CO2 32 12/12/2022    CL 97 12/12/2022    BUN 19 12/12/2022    CREATININE 0 87 12/12/2022     Lab Results   Component Value Date    WBC 4 58 12/12/2022    HGB 12 4 12/12/2022    HCT 39 0 12/12/2022     (H) 12/12/2022     12/12/2022     RENAL ULTRASOUND     INDICATION: Renal cell carcinoma status post cryoablation  C64 2: Malignant neoplasm of left kidney, except renal pelvis      COMPARISON: CT abdomen pelvis 6/17/2022, ultrasound kidney bladder 12/6/2021     TECHNIQUE:   Ultrasound of the retroperitoneum was performed with a curvilinear transducer utilizing volumetric sweeps and still imaging techniques       FINDINGS:     KIDNEYS:     Right kidney:  10 4 x 4 3 x 5 5 cm   Volume 128 0 mL  Left kidney:  10 7 x 5 3 x 5  4 cm  Volume 160 3 mL     Right kidney  Normal echogenicity and contour  No mass is identified  No hydronephrosis  No shadowing calculi  No perinephric fluid collections      Left kidney  Focal cortical thinning at the mid to lower pole posteriorly  This represents the site of prior cryoablation  No findings for recurrent mass  A simple cyst in the upper to midpole measures 2 3 x 1 5 x 1 3 cm  This is slightly smaller previously 3 0 x 1 6 x 1 7 cm  No hydronephrosis  No shadowing calculi  No perinephric fluid collections      URETERS:  Nonvisualized      BLADDER:   Underdistended on presentation limiting evaluation  Bilateral ureteral jets not detected         IMPRESSION:     1  Stable post treatment changes at the left kidney mid to lower pole  No sonographic findings to suggest recurrent mass  2  Limited assessment of the bladder due to underdistention  Orders  Orders Placed This Encounter   Procedures   • CT renal protocol     Standing Status:   Future     Standing Expiration Date:   1/4/2027     Scheduling Instructions:      Nothing to eat 3 hours prior to your test   Clear liquids are also permitted up until the time of the scan  Clear liquids include water, black coffee or tea, apple juice or clear broth  If possible wear clothing without any metal in the abdomen area  Sweat suit, shorts, sports bra or bra without underwire may eliminate the need to change  Please bring your insurance cards, a form of photo ID and a list of your medications with you  Arrive 15 minutes prior to your appointment time in order to register  On the day of your test, please bring any prior CT or MRI studies of this area with you that were not performed at a [facility]  To schedule this appointment, please contact Central Scheduling at [phone]  Order Specific Question:   What is the patient's sedation requirement?      Answer:   No Sedation     Order Specific Question: Contrast information:     Answer:   IV     Order Specific Question:   Did the patient ever have a reaction to x-ray dye? If yes, please verify the type of allergy and order the contrast allergy prep  Answer:   No     Order Specific Question:   Release to patient through Mychart     Answer:   Immediate     Order Specific Question:   Reason for Exam (FREE TEXT)     Answer:   Hx RCC   • XR chest pa & lateral     Standing Status:   Future     Standing Expiration Date:   1/4/2027     Scheduling Instructions:      Bring along any outside films relating to this procedure  • Basic metabolic panel     This is a patient instruction: Patient fasting for 8 hours or longer recommended       Standing Status:   Future     Standing Expiration Date:   1/4/2024       OPAL Umanzor Aas denies pain/discomfort (Rating = 0)

## 2023-06-08 ENCOUNTER — APPOINTMENT (OUTPATIENT)
Dept: RADIOLOGY | Facility: CLINIC | Age: 78
End: 2023-06-08
Payer: MEDICARE

## 2023-06-08 ENCOUNTER — OFFICE VISIT (OUTPATIENT)
Dept: FAMILY MEDICINE CLINIC | Facility: CLINIC | Age: 78
End: 2023-06-08
Payer: MEDICARE

## 2023-06-08 VITALS
RESPIRATION RATE: 16 BRPM | BODY MASS INDEX: 21.18 KG/M2 | OXYGEN SATURATION: 100 % | SYSTOLIC BLOOD PRESSURE: 130 MMHG | HEIGHT: 69 IN | HEART RATE: 59 BPM | DIASTOLIC BLOOD PRESSURE: 76 MMHG | WEIGHT: 143 LBS

## 2023-06-08 DIAGNOSIS — C64.2 RENAL CELL ADENOCARCINOMA, LEFT (HCC): ICD-10-CM

## 2023-06-08 DIAGNOSIS — E03.9 ACQUIRED HYPOTHYROIDISM: ICD-10-CM

## 2023-06-08 DIAGNOSIS — R05.3 PERSISTENT COUGH: Primary | ICD-10-CM

## 2023-06-08 DIAGNOSIS — I48.19 PERSISTENT ATRIAL FIBRILLATION (HCC): ICD-10-CM

## 2023-06-08 DIAGNOSIS — I50.32 CHRONIC DIASTOLIC (CONGESTIVE) HEART FAILURE (HCC): ICD-10-CM

## 2023-06-08 DIAGNOSIS — R05.3 PERSISTENT COUGH: ICD-10-CM

## 2023-06-08 PROCEDURE — 99214 OFFICE O/P EST MOD 30 MIN: CPT | Performed by: FAMILY MEDICINE

## 2023-06-08 PROCEDURE — G0439 PPPS, SUBSEQ VISIT: HCPCS | Performed by: FAMILY MEDICINE

## 2023-06-08 PROCEDURE — 71046 X-RAY EXAM CHEST 2 VIEWS: CPT

## 2023-06-08 RX ORDER — AZITHROMYCIN 250 MG/1
TABLET, FILM COATED ORAL
Qty: 6 TABLET | Refills: 0 | Status: SHIPPED | OUTPATIENT
Start: 2023-06-08 | End: 2023-06-12

## 2023-06-08 RX ORDER — OMEPRAZOLE 40 MG/1
40 CAPSULE, DELAYED RELEASE ORAL
Qty: 90 CAPSULE | Refills: 0 | Status: SHIPPED | OUTPATIENT
Start: 2023-06-08 | End: 2023-12-05

## 2023-06-08 RX ORDER — LEVOFLOXACIN 500 MG/1
500 TABLET, FILM COATED ORAL EVERY 24 HOURS
Qty: 10 TABLET | Refills: 0 | Status: SHIPPED | OUTPATIENT
Start: 2023-06-08 | End: 2023-06-12

## 2023-06-08 RX ORDER — FLUTICASONE PROPIONATE 50 MCG
1 SPRAY, SUSPENSION (ML) NASAL DAILY
Qty: 16 ML | Refills: 0 | Status: SHIPPED | OUTPATIENT
Start: 2023-06-08

## 2023-06-08 NOTE — PROGRESS NOTES
Assessment and Plan:     Problem List Items Addressed This Visit        Endocrine    Hypothyroidism    Relevant Medications    omeprazole (PriLOSEC) 40 MG capsule    fluticasone (FLONASE) 50 mcg/act nasal spray    azithromycin (Zithromax) 250 mg tablet    Other Relevant Orders    CBC and differential    Basic metabolic panel    Lipid Panel with Direct LDL reflex    XR chest pa & lateral    C-reactive protein    Magnesium    TSH, 3rd generation with Free T4 reflex       Cardiovascular and Mediastinum    Persistent atrial fibrillation (HCC)    Relevant Medications    omeprazole (PriLOSEC) 40 MG capsule    fluticasone (FLONASE) 50 mcg/act nasal spray    azithromycin (Zithromax) 250 mg tablet    Other Relevant Orders    CBC and differential    Basic metabolic panel    Lipid Panel with Direct LDL reflex    XR chest pa & lateral    C-reactive protein    Magnesium    TSH, 3rd generation with Free T4 reflex    Chronic diastolic (congestive) heart failure (HCC)       Genitourinary    Renal cell adenocarcinoma, left (HCC)    Relevant Medications    omeprazole (PriLOSEC) 40 MG capsule    fluticasone (FLONASE) 50 mcg/act nasal spray    azithromycin (Zithromax) 250 mg tablet    Other Relevant Orders    CBC and differential    Basic metabolic panel    Lipid Panel with Direct LDL reflex    XR chest pa & lateral    C-reactive protein    Magnesium    TSH, 3rd generation with Free T4 reflex   Other Visit Diagnoses     Persistent cough    -  Primary    Relevant Medications    omeprazole (PriLOSEC) 40 MG capsule    fluticasone (FLONASE) 50 mcg/act nasal spray    azithromycin (Zithromax) 250 mg tablet    Other Relevant Orders    CBC and differential    Basic metabolic panel    Lipid Panel with Direct LDL reflex    XR chest pa & lateral    C-reactive protein    Magnesium    TSH, 3rd generation with Free T4 reflex          Depression Screening and Follow-up Plan: Patient was screened for depression during today's encounter   They screened negative with a PHQ-2 score of 0  Preventive health issues were discussed with patient, and age appropriate screening tests were ordered as noted in patient's After Visit Summary  Personalized health advice and appropriate referrals for health education or preventive services given if needed, as noted in patient's After Visit Summary  History of Present Illness:     Patient presents for a Medicare Wellness Visit    HPI     Here to go over chronic issues and labs / imaging studies if applicable  And persistent cough   X 5 months   ceftin and flonase then    Patient Care Team:  Margarito Mortimer, MD as PCP - General (Family Medicine)     Review of Systems:     Review of Systems   Constitutional: Negative for activity change, appetite change and fever  HENT: Negative for congestion, nosebleeds and trouble swallowing  Eyes: Negative for itching  Respiratory: Positive for cough  Negative for chest tightness  Cardiovascular: Negative for chest pain and palpitations  Gastrointestinal: Negative for abdominal pain, constipation, diarrhea and nausea  Endocrine: Negative for cold intolerance  Genitourinary: Negative for frequency  Musculoskeletal: Negative for gait problem and joint swelling  Skin: Negative for rash  Allergic/Immunologic: Negative for immunocompromised state  Neurological: Negative for dizziness, tremors, seizures, syncope and headaches  Psychiatric/Behavioral: Negative for hallucinations and suicidal ideas          Problem List:     Patient Active Problem List   Diagnosis   • Renal cell adenocarcinoma, left (HCC)   • Vitamin D deficiency, unspecified   • Pure hypercholesterolemia   • Malignant neoplasm of left kidney, except renal pelvis ([facility] [address] )   • Hyperlipidemia   • Hypothyroidism   • Gastroesophageal reflux   • Shortness of breath   • Persistent atrial fibrillation (HCC)   • Gastroesophageal reflux disease without esophagitis   • Mixed hyperlipidemia   • Chronic diastolic (congestive) heart failure (HCC)   • Statin intolerance      Past Medical and Surgical History:     Past Medical History:   Diagnosis Date   • Dyslipidemia    • Erectile dysfunction of non-organic origin    • Gastroesophageal reflux    • Hyperlipidemia    • Hypothyroidism    • Liver disease    • Malignant neoplasm of kidney (HCC)     Left   • Malignant neoplasm of left kidney, except renal pelvis (HCC)    • Pure hypercholesterolemia    • Thyroid disease    • Vitamin D deficiency, unspecified      Past Surgical History:   Procedure Laterality Date   • CATARACT EXTRACTION Bilateral 2014   • CYSTOSCOPY     • EGD AND COLONOSCOPY  12/15/2009   • HERNIA REPAIR Left     Inguinal   • URETHRA SURGERY  05/2010    Done by Dr Livan Gustafson       Family History:     Family History   Problem Relation Age of Onset   • Cancer Mother         bladder   • Heart disease Mother    • Prostate cancer Father    • Stroke Father    • Heart disease Father    • Hyperlipidemia Family       Social History:     Social History     Socioeconomic History   • Marital status: /Civil Union     Spouse name: None   • Number of children: None   • Years of education: None   • Highest education level: None   Occupational History   • None   Tobacco Use   • Smoking status: Former   • Smokeless tobacco: Never   • Tobacco comments:     quit at age 23   Vaping Use   • Vaping Use: Former   Substance and Sexual Activity   • Alcohol use: Not Currently   • Drug use: No   • Sexual activity: None   Other Topics Concern   • None   Social History Narrative   • None     Social Determinants of Health     Financial Resource Strain: Low Risk  (6/8/2023)    Overall Financial Resource Strain (CARDIA)    • Difficulty of Paying Living Expenses: Not hard at all   Food Insecurity: Not on file   Transportation Needs: No Transportation Needs (6/8/2023)    PRAPARE - Transportation    • Lack of Transportation (Medical): No    • Lack of Transportation (Non-Medical):  No   Physical Activity: Not on file   Stress: Not on file   Social Connections: Not on file   Intimate Partner Violence: Not on file   Housing Stability: Not on file      Medications and Allergies:     Current Outpatient Medications   Medication Sig Dispense Refill   • Ascorbic Acid (vitamin C) 1000 MG tablet Take 1,000 mg by mouth daily     • azithromycin (Zithromax) 250 mg tablet Take 2 tablets (500 mg total) by mouth daily for 1 day, THEN 1 tablet (250 mg total) daily for 4 days   6 tablet 0   • cholecalciferol (VITAMIN D3) 1,000 units tablet Take 1,000 Units by mouth daily     • Coenzyme Q10 (CoQ10) 100 MG CAPS Take 2 capsules (200 mg total) by mouth daily 60 capsule 0   • fluticasone (FLONASE) 50 mcg/act nasal spray 1 spray into each nostril daily 16 mL 0   • gabapentin (Neurontin) 100 mg capsule Take 1 capsule (100 mg total) by mouth 3 (three) times a day 30 capsule 0   • levothyroxine 75 mcg tablet Take 1 tablet (75 mcg total) by mouth daily in the early morning 90 tablet 1   • magnesium oxide (MAG-OX) 400 mg Take 400 mg by mouth daily     • melatonin 3 mg Take by mouth daily at bedtime     • omeprazole (PriLOSEC) 40 MG capsule Take 1 capsule (40 mg total) by mouth daily before dinner 90 capsule 0   • potassium chloride (MICRO-K) 10 MEQ CR capsule Take 2 capsules (20 mEq total) by mouth daily 180 capsule 3   • pravastatin (PRAVACHOL) 10 mg tablet Take 1 tablet (10 mg total) by mouth daily 30 tablet 3   • Probiotic Product (CULTURELLE PROBIOTICS PO) Take by mouth     • rivaroxaban (XARELTO) 20 mg tablet Take 1 tablet (20 mg total) by mouth daily after dinner 90 tablet 3   • sotalol (BETAPACE) 80 mg tablet Take 1 tablet (80 mg total) by mouth every 12 (twelve) hours 180 tablet 3   • vitamin B-12 (VITAMIN B-12) 1,000 mcg tablet Take by mouth daily     • furosemide (LASIX) 20 mg tablet Take 1 tablet (20 mg total) by mouth daily as needed (for edema/weight gain) (Patient not taking: Reported on 11/7/2022) 90 tablet 0   • valACYclovir (VALTREX) 1,000 mg tablet Take 1 tablet (1,000 mg total) by mouth 3 (three) times a day for 7 days 21 tablet 0     No current facility-administered medications for this visit  Allergies   Allergen Reactions   • Atorvastatin      Muscle pain   • Crestor [Rosuvastatin] Dizziness   • Vytorin [Ezetimibe-Simvastatin] Dizziness      Immunizations:     Immunization History   Administered Date(s) Administered   • INFLUENZA 09/22/2020, 12/06/2022   • Influenza, high dose seasonal 0 7 mL 12/06/2022   • Pneumococcal 03/23/2009   • Pneumococcal Conjugate 13-Valent 10/07/2015   • Pneumococcal Conjugate PCV 7 03/23/2009   • Pneumococcal Polysaccharide PPV23 12/01/2021   • influenza, trivalent, adjuvanted 09/22/2020      Health Maintenance:         Topic Date Due   • Hepatitis C Screening  Completed         Topic Date Due   • COVID-19 Vaccine (1) Never done      Medicare Screening Tests and Risk Assessments:     Myriam Telles is here for his Subsequent Wellness visit  Last Medicare Wellness visit information reviewed, patient interviewed and updates made to the record today  Health Risk Assessment:   Patient rates overall health as good  Patient feels that their physical health rating is same  Patient is very satisfied with their life  Eyesight was rated as same  Hearing was rated as same  Patient feels that their emotional and mental health rating is same  Patients states they are never, rarely angry  Patient states they are never, rarely unusually tired/fatigued  Pain experienced in the last 7 days has been none  Patient states that he has experienced no weight loss or gain in last 6 months  Fall Risk Screening: In the past year, patient has experienced: no history of falling in past year      Home Safety:  Patient does not have trouble with stairs inside or outside of their home  Patient has working smoke alarms and has no working carbon monoxide detector  Home safety hazards include: none       Nutrition: Current diet is Regular  Medications:   Patient is currently taking over-the-counter supplements  OTC medications include: see medication list  Patient is able to manage medications  Activities of Daily Living (ADLs)/Instrumental Activities of Daily Living (IADLs):   Walk and transfer into and out of bed and chair?: Yes  Dress and groom yourself?: Yes    Bathe or shower yourself?: Yes    Feed yourself? Yes  Do your laundry/housekeeping?: Yes  Manage your money, pay your bills and track your expenses?: Yes  Make your own meals?: Yes    Do your own shopping?: Yes    Previous Hospitalizations:   Any hospitalizations or ED visits within the last 12 months?: No    How many hospitalizations have you had in the last year?: 1-2    Advance Care Planning:   Living will: No    Durable POA for healthcare: No    Advanced directive: No    Five wishes given: No      Cognitive Screening:   Provider or family/friend/caregiver concerned regarding cognition?: No    PREVENTIVE SCREENINGS      Cardiovascular Screening:    General: Screening Not Indicated and History Lipid Disorder      Diabetes Screening:     General: Screening Current      Colorectal Cancer Screening:     General: Screening Not Indicated      Prostate Cancer Screening:    General: Screening Not Indicated      Osteoporosis Screening:    General: Screening Not Indicated      Abdominal Aortic Aneurysm (AAA) Screening:    Risk factors include: tobacco use        Lung Cancer Screening:     General: Screening Not Indicated      Hepatitis C Screening:    General: Screening Current    Screening, Brief Intervention, and Referral to Treatment (SBIRT)    Screening  Typical number of drinks in a day: 0  Typical number of drinks in a week: 0  Interpretation: Low risk drinking behavior      Single Item Drug Screening:  How often have you used an illegal drug (including marijuana) or a prescription medication for non-medical reasons in the past year? never    Single Item "Drug Screen Score: 0  Interpretation: Negative screen for possible drug use disorder    Brief Intervention  Alcohol & drug use screenings were reviewed  No concerns regarding substance use disorder identified  No results found  Physical Exam:     /76 (BP Location: Left arm, Patient Position: Sitting, Cuff Size: Standard)   Pulse 59   Resp 16   Ht 5' 9\" (1 753 m)   Wt 64 9 kg (143 lb)   SpO2 100%   BMI 21 12 kg/m²     Physical Exam  Vitals and nursing note reviewed  Constitutional:       Appearance: He is well-developed  HENT:      Head: Normocephalic and atraumatic  Ears:      Comments: Bilateral aids  Eyes:      Conjunctiva/sclera: Conjunctivae normal       Pupils: Pupils are equal, round, and reactive to light  Cardiovascular:      Rate and Rhythm: Normal rate  Rhythm irregular  Heart sounds: Normal heart sounds  Pulmonary:      Effort: Pulmonary effort is normal       Breath sounds: Normal breath sounds  No wheezing or rales  Abdominal:      General: Bowel sounds are normal  There is no distension  Palpations: Abdomen is soft  Tenderness: There is no abdominal tenderness  Musculoskeletal:         General: No tenderness  Normal range of motion  Cervical back: Normal range of motion and neck supple  Skin:     General: Skin is warm and dry  Findings: No rash  Neurological:      Mental Status: He is alert and oriented to person, place, and time  Cranial Nerves: No cranial nerve deficit  Sensory: No sensory deficit  Coordination: Coordination normal    Psychiatric:         Behavior: Behavior normal          Thought Content:  Thought content normal          Judgment: Judgment normal           Sarah Montes MD  "

## 2023-06-09 ENCOUNTER — APPOINTMENT (OUTPATIENT)
Dept: LAB | Facility: AMBULARY SURGERY CENTER | Age: 78
End: 2023-06-09
Payer: MEDICARE

## 2023-06-09 DIAGNOSIS — R05.3 PERSISTENT COUGH: ICD-10-CM

## 2023-06-09 DIAGNOSIS — I48.19 PERSISTENT ATRIAL FIBRILLATION (HCC): ICD-10-CM

## 2023-06-09 DIAGNOSIS — E03.9 ACQUIRED HYPOTHYROIDISM: ICD-10-CM

## 2023-06-09 DIAGNOSIS — C64.2 RENAL CELL ADENOCARCINOMA, LEFT (HCC): ICD-10-CM

## 2023-06-09 LAB
ANION GAP SERPL CALCULATED.3IONS-SCNC: 0 MMOL/L (ref 4–13)
BASOPHILS # BLD AUTO: 0.01 THOUSANDS/ÂΜL (ref 0–0.1)
BASOPHILS NFR BLD AUTO: 0 % (ref 0–1)
BUN SERPL-MCNC: 18 MG/DL (ref 5–25)
CALCIUM SERPL-MCNC: 9.6 MG/DL (ref 8.3–10.1)
CHLORIDE SERPL-SCNC: 104 MMOL/L (ref 96–108)
CHOLEST SERPL-MCNC: 248 MG/DL
CO2 SERPL-SCNC: 32 MMOL/L (ref 21–32)
CREAT SERPL-MCNC: 0.96 MG/DL (ref 0.6–1.3)
CRP SERPL QL: 8.8 MG/L
EOSINOPHIL # BLD AUTO: 0.13 THOUSAND/ÂΜL (ref 0–0.61)
EOSINOPHIL NFR BLD AUTO: 2 % (ref 0–6)
ERYTHROCYTE [DISTWIDTH] IN BLOOD BY AUTOMATED COUNT: 13.1 % (ref 11.6–15.1)
GFR SERPL CREATININE-BSD FRML MDRD: 74 ML/MIN/1.73SQ M
GLUCOSE P FAST SERPL-MCNC: 94 MG/DL (ref 65–99)
HCT VFR BLD AUTO: 38.6 % (ref 36.5–49.3)
HDLC SERPL-MCNC: 48 MG/DL
HGB BLD-MCNC: 12.8 G/DL (ref 12–17)
IMM GRANULOCYTES # BLD AUTO: 0.03 THOUSAND/UL (ref 0–0.2)
IMM GRANULOCYTES NFR BLD AUTO: 1 % (ref 0–2)
LDLC SERPL CALC-MCNC: 182 MG/DL (ref 0–100)
LYMPHOCYTES # BLD AUTO: 1.27 THOUSANDS/ÂΜL (ref 0.6–4.47)
LYMPHOCYTES NFR BLD AUTO: 22 % (ref 14–44)
MAGNESIUM SERPL-MCNC: 2.3 MG/DL (ref 1.6–2.6)
MCH RBC QN AUTO: 33.9 PG (ref 26.8–34.3)
MCHC RBC AUTO-ENTMCNC: 33.2 G/DL (ref 31.4–37.4)
MCV RBC AUTO: 102 FL (ref 82–98)
MONOCYTES # BLD AUTO: 0.61 THOUSAND/ÂΜL (ref 0.17–1.22)
MONOCYTES NFR BLD AUTO: 11 % (ref 4–12)
NEUTROPHILS # BLD AUTO: 3.69 THOUSANDS/ÂΜL (ref 1.85–7.62)
NEUTS SEG NFR BLD AUTO: 64 % (ref 43–75)
NRBC BLD AUTO-RTO: 0 /100 WBCS
PLATELET # BLD AUTO: 269 THOUSANDS/UL (ref 149–390)
PMV BLD AUTO: 8.9 FL (ref 8.9–12.7)
POTASSIUM SERPL-SCNC: 3.9 MMOL/L (ref 3.5–5.3)
RBC # BLD AUTO: 3.78 MILLION/UL (ref 3.88–5.62)
SODIUM SERPL-SCNC: 136 MMOL/L (ref 135–147)
TRIGL SERPL-MCNC: 89 MG/DL
TSH SERPL DL<=0.05 MIU/L-ACNC: 0.97 UIU/ML (ref 0.45–4.5)
WBC # BLD AUTO: 5.74 THOUSAND/UL (ref 4.31–10.16)

## 2023-06-09 PROCEDURE — 83735 ASSAY OF MAGNESIUM: CPT

## 2023-06-09 PROCEDURE — 36415 COLL VENOUS BLD VENIPUNCTURE: CPT

## 2023-06-09 PROCEDURE — 84443 ASSAY THYROID STIM HORMONE: CPT

## 2023-06-09 PROCEDURE — 80061 LIPID PANEL: CPT

## 2023-06-09 PROCEDURE — 85025 COMPLETE CBC W/AUTO DIFF WBC: CPT

## 2023-06-09 PROCEDURE — 80048 BASIC METABOLIC PNL TOTAL CA: CPT

## 2023-06-09 PROCEDURE — 86140 C-REACTIVE PROTEIN: CPT

## 2023-06-10 DIAGNOSIS — J18.9 MULTIFOCAL PNEUMONIA: Primary | ICD-10-CM

## 2023-06-12 ENCOUNTER — TELEPHONE (OUTPATIENT)
Dept: FAMILY MEDICINE CLINIC | Facility: CLINIC | Age: 78
End: 2023-06-12

## 2023-06-12 DIAGNOSIS — J18.9 MULTIFOCAL PNEUMONIA: Primary | ICD-10-CM

## 2023-06-12 RX ORDER — CEFPODOXIME PROXETIL 200 MG/1
200 TABLET, FILM COATED ORAL 2 TIMES DAILY
Qty: 20 TABLET | Refills: 0 | Status: SHIPPED | OUTPATIENT
Start: 2023-06-12 | End: 2023-06-22

## 2023-06-12 NOTE — TELEPHONE ENCOUNTER
----- Message from Gaurang Garrido MD sent at 6/12/2023  5:26 AM EDT -----  Isaiah x 10 days should help   Finish the course   Andthen cxr in 2weeks please

## 2023-06-12 NOTE — TELEPHONE ENCOUNTER
----- Message from Arlene Marie MD sent at 6/12/2023  6:21 AM EDT -----  Other than pneumonia   This is all stable and fine ! ( I would like you to consider researching new cholesterol lowering medications) just google it

## 2023-06-26 ENCOUNTER — APPOINTMENT (OUTPATIENT)
Dept: RADIOLOGY | Facility: CLINIC | Age: 78
End: 2023-06-26
Payer: MEDICARE

## 2023-06-26 DIAGNOSIS — J18.9 MULTIFOCAL PNEUMONIA: ICD-10-CM

## 2023-06-26 PROCEDURE — 71046 X-RAY EXAM CHEST 2 VIEWS: CPT

## 2023-07-03 ENCOUNTER — TELEPHONE (OUTPATIENT)
Dept: FAMILY MEDICINE CLINIC | Facility: CLINIC | Age: 78
End: 2023-07-03

## 2023-07-03 DIAGNOSIS — J18.9 MULTIFOCAL PNEUMONIA: Primary | ICD-10-CM

## 2023-07-03 NOTE — TELEPHONE ENCOUNTER
----- Message from Allyson Rushing MD sent at 7/3/2023  6:46 AM EDT -----  Repeat in 4-5 weeks please as per recommendations   How do you feel? Any better?

## 2023-07-14 ENCOUNTER — APPOINTMENT (OUTPATIENT)
Dept: RADIOLOGY | Facility: MEDICAL CENTER | Age: 78
End: 2023-07-14
Payer: MEDICARE

## 2023-07-14 DIAGNOSIS — J18.9 MULTIFOCAL PNEUMONIA: ICD-10-CM

## 2023-07-14 PROCEDURE — 71046 X-RAY EXAM CHEST 2 VIEWS: CPT

## 2023-07-20 ENCOUNTER — TELEPHONE (OUTPATIENT)
Dept: FAMILY MEDICINE CLINIC | Facility: CLINIC | Age: 78
End: 2023-07-20

## 2023-07-20 DIAGNOSIS — R91.8 ABNORMAL X-RAY OF LUNG: ICD-10-CM

## 2023-07-20 DIAGNOSIS — J18.9 MULTIFOCAL PNEUMONIA: Primary | ICD-10-CM

## 2023-07-20 DIAGNOSIS — R05.3 PERSISTENT COUGH: ICD-10-CM

## 2023-07-20 NOTE — TELEPHONE ENCOUNTER
----- Message from Lindy Brewer MD sent at 7/20/2023  5:57 AM EDT -----  Marjan Lechuga its still not getting better   Were going to get a high definition look with a chest CT scan

## 2023-07-28 ENCOUNTER — REMOTE DEVICE CLINIC VISIT (OUTPATIENT)
Dept: CARDIOLOGY CLINIC | Facility: CLINIC | Age: 78
End: 2023-07-28
Payer: MEDICARE

## 2023-07-28 DIAGNOSIS — Z95.0 PRESENCE OF CARDIAC PACEMAKER: Primary | ICD-10-CM

## 2023-07-28 PROCEDURE — 93296 REM INTERROG EVL PM/IDS: CPT | Performed by: INTERNAL MEDICINE

## 2023-07-28 PROCEDURE — 93294 REM INTERROG EVL PM/LDLS PM: CPT | Performed by: INTERNAL MEDICINE

## 2023-07-28 NOTE — PROGRESS NOTES
Results for orders placed or performed in visit on 07/28/23   Cardiac EP device report    Narrative    MDT DUAL PM/ ACTIVE SYSTEM IS MRI CONDITIONAL  CARELINK TRANSMISSION: BATTERY VOLTAGE ADEQUATE (9.8 YRS). AP: 72.5%. : 97.7% (>40%~DDDR/60). ALL AVAILABLE LEAD PARAMETERS WITHIN NORMAL LIMITS. NO SIGNIFICANT HIGH RATE EPISODES. NORMAL DEVICE FUNCTION.   [id number] [address]

## 2023-07-30 DIAGNOSIS — I48.19 PERSISTENT ATRIAL FIBRILLATION (HCC): ICD-10-CM

## 2023-07-30 DIAGNOSIS — C64.2 RENAL CELL ADENOCARCINOMA, LEFT (HCC): ICD-10-CM

## 2023-07-30 DIAGNOSIS — E03.9 ACQUIRED HYPOTHYROIDISM: ICD-10-CM

## 2023-07-30 DIAGNOSIS — R05.3 PERSISTENT COUGH: ICD-10-CM

## 2023-07-31 RX ORDER — FLUTICASONE PROPIONATE 50 MCG
SPRAY, SUSPENSION (ML) NASAL
Qty: 24 ML | Refills: 1 | OUTPATIENT
Start: 2023-07-31

## 2023-08-02 ENCOUNTER — HOSPITAL ENCOUNTER (OUTPATIENT)
Dept: CT IMAGING | Facility: HOSPITAL | Age: 78
Discharge: HOME | End: 2023-08-02
Payer: MEDICARE

## 2023-08-02 DIAGNOSIS — J18.9 MULTIFOCAL PNEUMONIA: ICD-10-CM

## 2023-08-02 DIAGNOSIS — R91.8 ABNORMAL X-RAY OF LUNG: ICD-10-CM

## 2023-08-02 DIAGNOSIS — C64.2 MALIGNANT NEOPLASM OF LEFT KIDNEY, EXCEPT RENAL PELVIS (HCC): ICD-10-CM

## 2023-08-02 DIAGNOSIS — R05.3 PERSISTENT COUGH: ICD-10-CM

## 2023-08-02 PROCEDURE — G1004 CDSM NDSC: HCPCS

## 2023-08-02 PROCEDURE — 74178 CT ABD&PLV WO CNTR FLWD CNTR: CPT

## 2023-08-02 PROCEDURE — 71250 CT THORAX DX C-: CPT

## 2023-08-02 RX ADMIN — IOHEXOL 100 ML: 350 INJECTION, SOLUTION INTRAVENOUS at 13:25

## 2023-08-04 ENCOUNTER — TELEPHONE (OUTPATIENT)
Dept: UROLOGY | Facility: AMBULATORY SURGERY CENTER | Age: 78
End: 2023-08-04

## 2023-08-04 NOTE — TELEPHONE ENCOUNTER
Pt under care of Jose Manuel Gorman     Last Seen: 1/4/23    Pt calling due to: appt to discuss CT that was done earlier due to another dr ordering CT as well         Pt can be reached at: [phone]

## 2023-08-10 DIAGNOSIS — J18.9 MULTIFOCAL PNEUMONIA: ICD-10-CM

## 2023-08-10 DIAGNOSIS — R05.3 PERSISTENT COUGH: Primary | ICD-10-CM

## 2023-08-10 NOTE — TELEPHONE ENCOUNTER
Reviewed history and current/past CT scans    I called the home number and the mobile number no answer  I left a voicemail on the mobile number    CT looks good postablation well treated rcc images are stable no residual/recurrent/spread of cancer  Other incidentals are some constipation and a small umbilical hernia and some mild atherosclerosis generally expected at age 77  Annual surveillance through five years  Next ct abdomen and chest xray in august 2024 and august 2025  His pulmonary team will likely follow the chest ct even more between now and then but from surveillance point for us is once a year is good

## 2023-08-29 DIAGNOSIS — R05.3 PERSISTENT COUGH: ICD-10-CM

## 2023-08-29 DIAGNOSIS — E03.9 ACQUIRED HYPOTHYROIDISM: ICD-10-CM

## 2023-08-29 DIAGNOSIS — I48.19 PERSISTENT ATRIAL FIBRILLATION (HCC): ICD-10-CM

## 2023-08-29 DIAGNOSIS — C64.2 RENAL CELL ADENOCARCINOMA, LEFT (HCC): ICD-10-CM

## 2023-08-29 RX ORDER — OMEPRAZOLE 40 MG/1
40 CAPSULE, DELAYED RELEASE ORAL
Qty: 90 CAPSULE | Refills: 0 | Status: SHIPPED | OUTPATIENT
Start: 2023-08-29 | End: 2024-02-25

## 2023-09-05 ENCOUNTER — CONSULT (OUTPATIENT)
Dept: PULMONOLOGY | Facility: CLINIC | Age: 78
End: 2023-09-05
Payer: MEDICARE

## 2023-09-05 VITALS
HEIGHT: 69 IN | SYSTOLIC BLOOD PRESSURE: 126 MMHG | BODY MASS INDEX: 20.44 KG/M2 | HEART RATE: 64 BPM | DIASTOLIC BLOOD PRESSURE: 82 MMHG | RESPIRATION RATE: 18 BRPM | WEIGHT: 138 LBS | OXYGEN SATURATION: 98 % | TEMPERATURE: 97.5 F

## 2023-09-05 DIAGNOSIS — R05.3 PERSISTENT COUGH: Primary | ICD-10-CM

## 2023-09-05 DIAGNOSIS — E44.0 MODERATE PROTEIN-CALORIE MALNUTRITION (HCC): ICD-10-CM

## 2023-09-05 DIAGNOSIS — J18.9 MULTIFOCAL PNEUMONIA: ICD-10-CM

## 2023-09-05 DIAGNOSIS — R13.10 DYSPHAGIA, UNSPECIFIED TYPE: ICD-10-CM

## 2023-09-05 PROCEDURE — 99204 OFFICE O/P NEW MOD 45 MIN: CPT | Performed by: INTERNAL MEDICINE

## 2023-09-05 RX ORDER — SODIUM CHLORIDE FOR INHALATION 3 %
4 VIAL, NEBULIZER (ML) INHALATION
Qty: 240 ML | Refills: 3 | Status: SHIPPED | OUTPATIENT
Start: 2023-09-05

## 2023-09-05 RX ORDER — ALBUTEROL SULFATE 2.5 MG/3ML
2.5 SOLUTION RESPIRATORY (INHALATION)
Qty: 180 ML | Refills: 3 | Status: SHIPPED | OUTPATIENT
Start: 2023-09-05

## 2023-09-05 NOTE — PROGRESS NOTES
Consultation - Pulmonary Medicine   Malissa Pillai Sr. 77 y.o. male MRN: [id number]    Physician Requesting Consult: Dr Karen Sethi  Reason for Consult: cough, abnormal CT    Malissa Pillai Sr. is a 77 y.o. male who presents for evaluation of chronic cough and abnormal CT. Bronchiolitis on CT  Chronic Cough  Pt with cough for 8+ months. CT Chest with extensive tree in bud, scattered nodules and consolidation in RML and lingula. Dx includes MAC vs chronic aspiration. Pt has GERD and patulous esophagus with many years of dysphagia to solid food and some liquids. - airway clearance regimen: albuteorl followed by nebiulizer TID  --- reviewed nebulizer technique  --- nebulizer prescribed  - AFB and respiratory cultures for sputum collection, provided collection specimen  - GI referral for dysphagia     Malnutrition  Poor appetite, weight loss  - Nutrition referral for malnutrition    Vaccines  - COVID unknown  - Flu due this season  - PNA UTD    Immunization History   Administered Date(s) Administered   • INFLUENZA 09/22/2020, 12/06/2022   • Influenza, high dose seasonal 0.7 mL 12/06/2022   • Pneumococcal 03/23/2009   • Pneumococcal Conjugate 13-Valent 10/07/2015   • Pneumococcal Conjugate PCV 7 03/23/2009   • Pneumococcal Polysaccharide PPV23 12/01/2021   • influenza, trivalent, adjuvanted 09/22/2020      ______________________________    HPI:    Malissa Pillai Sr. is a 77 y.o. male who presents for evaluaiton of chornic cough and abnormal CT. PMH includes GERD, Afib, CHF, RCC (left) sp cryoablation in 2020 with no recurrence. Pt reports chronic cough for 8 months. Got 10day course abx (ceftin and azithromycin) x2 which improved symptoms initially but then cough came back. Bring up mucous and phlegm, yellowish gray  Does report some dysphagia for many years, worse with pills and solid food than liquid. Also with significant GERD, PPI didn't help so he stopped using  No fevers, chills.  Did have night sweats a few times in January but this resolved. 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     Current Medications:    Current Outpatient Medications:   •  Ascorbic Acid (vitamin C) 1000 MG tablet, Take 1,000 mg by mouth daily, Disp: , Rfl:   •  cholecalciferol (VITAMIN D3) 1,000 units tablet, Take 1,000 Units by mouth daily, Disp: , Rfl:   •  Coenzyme Q10 (CoQ10) 100 MG CAPS, Take 2 capsules (200 mg total) by mouth daily, Disp: 60 capsule, Rfl: 0  •  fluticasone (FLONASE) 50 mcg/act nasal spray, 1 spray into each nostril daily, Disp: 16 mL, Rfl: 0  •  furosemide (LASIX) 20 mg tablet, Take 1 tablet (20 mg total) by mouth daily as needed (for edema/weight gain) (Patient not taking: Reported on 11/7/2022), Disp: 90 tablet, Rfl: 0  •  gabapentin (Neurontin) 100 mg capsule, Take 1 capsule (100 mg total) by mouth 3 (three) times a day, Disp: 30 capsule, Rfl: 0  •  levothyroxine 75 mcg tablet, Take 1 tablet (75 mcg total) by mouth daily in the early morning, Disp: 90 tablet, Rfl: 1  •  magnesium oxide (MAG-OX) 400 mg, Take 400 mg by mouth daily, Disp: , Rfl:   •  melatonin 3 mg, Take by mouth daily at bedtime, Disp: , Rfl:   •  omeprazole (PriLOSEC) 40 MG capsule, TAKE 1 CAPSULE (40 MG TOTAL) BY MOUTH DAILY BEFORE DINNER, Disp: 90 capsule, Rfl: 0  •  potassium chloride (MICRO-K) 10 MEQ CR capsule, Take 2 capsules (20 mEq total) by mouth daily, Disp: 180 capsule, Rfl: 3  •  pravastatin (PRAVACHOL) 10 mg tablet, Take 1 tablet (10 mg total) by mouth daily, Disp: 30 tablet, Rfl: 3  •  Probiotic Product (CULTURELLE PROBIOTICS PO), Take by mouth, Disp: , Rfl:   •  rivaroxaban (XARELTO) 20 mg tablet, Take 1 tablet (20 mg total) by mouth daily after dinner, Disp: 90 tablet, Rfl: 3  •  sotalol (BETAPACE) 80 mg tablet, Take 1 tablet (80 mg total) by mouth every 12 (twelve) hours, Disp: 180 tablet, Rfl: 3  •  valACYclovir (VALTREX) 1,000 mg tablet, Take 1 tablet (1,000 mg total) by mouth 3 (three) times a day for 7 days, Disp: 21 tablet, Rfl: 0  •  vitamin B-12 (VITAMIN B-12) 1,000 mcg tablet, Take by mouth daily, Disp: , Rfl:     Historical Information   Past Medical History:   Diagnosis Date   • Dyslipidemia    • Erectile dysfunction of non-organic origin    • Gastroesophageal reflux    • Hyperlipidemia    • Hypothyroidism    • Liver disease    • Malignant neoplasm of kidney (HCC)     Left   • Malignant neoplasm of left kidney, except renal pelvis (HCC)    • Pure hypercholesterolemia    • Thyroid disease    • Vitamin D deficiency, unspecified      Past Surgical History:   Procedure Laterality Date   • CATARACT EXTRACTION Bilateral 2014   • CYSTOSCOPY     • EGD AND COLONOSCOPY  12/15/2009   • HERNIA REPAIR Left     Inguinal   • URETHRA SURGERY  05/2010    Done by Dr. Juan Willingham History   Social History     Tobacco Use   Smoking Status Former   Smokeless Tobacco Never   Tobacco Comments    quit at age 23       Family History:   Family History   Problem Relation Age of Onset   • Cancer Mother         bladder   • Heart disease Mother    • Prostate cancer Father    • Stroke Father    • Heart disease Father    • Hyperlipidemia Family          PhysicalExamination:  Vitals: There were no vitals taken for this visit.     Appearance -- NAD, speaking full sentences  HEENT -- anicteric sclera, clear OP, MMM  Neck -- no JVD  Heart -- RRR, no murmurs  Lungs -- mild R mid lung rhonchi  Abdomen -- soft, NTND, +bs  Extremities -- WWP, no LE edema  Skin -- no rash  Neuro -- A&Ox3, wnl  Psych -- no obvious depression or hallucination        Diagnostic Data:  Labs: I personally reviewed the most recent laboratory data pertinent to today's visit    Lab Results   Component Value Date    WBC 5.74 06/09/2023    HGB 12.8 06/09/2023    HCT 38.6 06/09/2023     (H) 06/09/2023     06/09/2023     Lab Results   Component Value Date    CALCIUM 9.6 06/09/2023    K 3.9 06/09/2023    CO2 32 06/09/2023     06/09/2023    BUN 18 06/09/2023    CREATININE 0.96 06/09/2023     No results found for: "IGE"  Lab Results   Component Value Date    ALT 44 12/12/2022    AST 28 12/12/2022    ALKPHOS 102 12/12/2022       PFT results: The most recent pulmonary function tests were reviewed. NONE    Imaging:  I personally reviewed the images on the [facility] system pertinent to today's visit  CT Chest 8/2023:  Extensive bilateral tree-in-bud nodularity and confluent nodules involving all lobes of both lungs with consolidation in the right middle lobe and inferior lingula, present since June 2023, new since June 2021. This could be atypical mycobacterial infection.  Recommend pulmonary referral.  Also with patulous esophagus          Rosa Curiel MD  [facility]

## 2023-09-11 ENCOUNTER — APPOINTMENT (OUTPATIENT)
Dept: LAB | Facility: AMBULARY SURGERY CENTER | Age: 78
End: 2023-09-11
Payer: MEDICARE

## 2023-09-11 DIAGNOSIS — J18.9 MULTIFOCAL PNEUMONIA: ICD-10-CM

## 2023-09-11 DIAGNOSIS — R05.3 PERSISTENT COUGH: ICD-10-CM

## 2023-09-19 ENCOUNTER — TELEPHONE (OUTPATIENT)
Dept: GASTROENTEROLOGY | Facility: AMBULARY SURGERY CENTER | Age: 78
End: 2023-09-19

## 2023-09-19 ENCOUNTER — OFFICE VISIT (OUTPATIENT)
Dept: GASTROENTEROLOGY | Facility: AMBULARY SURGERY CENTER | Age: 78
End: 2023-09-19
Payer: MEDICARE

## 2023-09-19 VITALS
HEART RATE: 64 BPM | BODY MASS INDEX: 20.47 KG/M2 | DIASTOLIC BLOOD PRESSURE: 78 MMHG | HEIGHT: 69 IN | SYSTOLIC BLOOD PRESSURE: 138 MMHG | WEIGHT: 138.2 LBS

## 2023-09-19 DIAGNOSIS — R13.10 DYSPHAGIA, UNSPECIFIED TYPE: ICD-10-CM

## 2023-09-19 DIAGNOSIS — R05.3 PERSISTENT COUGH: Primary | ICD-10-CM

## 2023-09-19 DIAGNOSIS — R05.3 CHRONIC COUGH: ICD-10-CM

## 2023-09-19 DIAGNOSIS — K21.9 GASTROESOPHAGEAL REFLUX DISEASE WITHOUT ESOPHAGITIS: ICD-10-CM

## 2023-09-19 PROCEDURE — 99204 OFFICE O/P NEW MOD 45 MIN: CPT | Performed by: PHYSICIAN ASSISTANT

## 2023-09-19 NOTE — H&P (VIEW-ONLY)
[facility] - Outpatient Consultation  Ila Baltazar Sr. 77 y.o. male MRN: [id number]  Encounter: [id number]          ASSESSMENT AND PLAN:      76-year-old male with history of A-fib on Xarelto, history of kidney cancer, who presents due to recent pneumonia and pulmonology concern for possible chronic aspiration component. 1. GERD  2. Dysphagia  3. Abdominal chest imaging, ?chronic aspiration  4. Chronic cough  Patient does have some improvement after starting nebulizer as recommended by pulmonologist.  He reports on and off reflux symptoms however dysphagia mainly to solid and pills of the upper esophagus. No classic signs of aspiration. Reports prior EGD many years ago. -Recommend scheduling EGD now for further evaluation of dysphagia symptoms  -We will need clearance by cardiology to hold Xarelto. -Patient is not currently on PPI and reports he would be hesitant to continue this long-term due to his concern that this was his cause of kidney cancer in the past.  However he would be agreeable for short-term courses if needed based on EGD results.    -Would also recommend barium swallow with speech due to pulmonology concern for chronic aspiration component    -I also sent a message to his recent pulmonologist to ensure okay to pursue EGD at this time versus if any further pulmonology work-up is going to be pursued first.    -I sent message to [facility] clinical pool to try to obtain his most recent EGD and colonoscopy. Follow-up in the office after EGD above.  ______________________________________________________________________    HPI:      Christophe Marroquin. is a 76-year-old male with history of GERD, hypothyroidism, A-fib on Xarelto, CHF, history of kidney cancer who presents the office for symptoms of reflux and dysphagia. Patient had on and off cough throughout the year.   He ended up getting chest x-ray around 3 months ago which showed pneumonia and he completed antibiotic course. Repeat imaging showed persistent findings. He had CT scan 8/2023 showing extensive bilateral nodularity throughout both lobes. He followed up with pulmonology earlier this month with concern for possible MAC vs chronic aspiration. He was started on nebulizer and does report some improvement of his symptoms currently. He reports occasional reflux however this is rare. He does report some dysphagia for pills and solids. He does not notice any significant coughing after eating. He does report being on Prilosec for many years however believes that this was the cause of his kidney cancer therefore has not ever restarted this. Per chart review he has lost around 10 pounds in the last 6 months. Most recent lab work around 3 months ago showing normal hemoglobin and platelets. BMP unremarkable. Recent CT of the chest last month with no GI findings. Last abdominal imaging with a CT scan over 1 year ago with unremarkable liver, bowel etc.    I am unable to see his prior EGD and colonoscopy reports. The last documentation of this was around 2009 however patient believes he had them again since then. He reports he was told he no longer needs any more colonoscopies. He reports his prior EGD showed narrow esophagus. REVIEW OF SYSTEMS:    CONSTITUTIONAL: Denies any fever, chills, rigors. +10lb weight loss 6 months  HEENT: No earache or tinnitus. Denies hearing loss or visual disturbances. CARDIOVASCULAR: No chest pain or palpitations. RESPIRATORY: +cough, improving  GASTROINTESTINAL: As noted in the History of Present Illness. GENITOURINARY: No problems with urination. Denies any hematuria or dysuria. NEUROLOGIC: No dizziness or vertigo, denies headaches. MUSCULOSKELETAL: Denies any muscle or joint pain. SKIN: Denies skin rashes or itching. ENDOCRINE: Denies excessive thirst. Denies intolerance to heat or cold. PSYCHOSOCIAL: Denies depression or anxiety.  Denies any recent memory loss.        Historical Information   Past Medical History:   Diagnosis Date   • Dyslipidemia    • Erectile dysfunction of non-organic origin    • Gastroesophageal reflux    • Hyperlipidemia    • Hypothyroidism    • Liver disease    • Malignant neoplasm of kidney (HCC)     Left   • Malignant neoplasm of left kidney, except renal pelvis (HCC)    • Pure hypercholesterolemia    • Thyroid disease    • Vitamin D deficiency, unspecified      Past Surgical History:   Procedure Laterality Date   • CARDIAC PACEMAKER PLACEMENT     • CATARACT EXTRACTION Bilateral 2014   • CYSTOSCOPY     • EGD AND COLONOSCOPY  12/15/2009   • HERNIA REPAIR Left     Inguinal   • URETHRA SURGERY  05/2010    Done by Dr. Sheela Tate History   Social History     Substance and Sexual Activity   Alcohol Use Not Currently     Social History     Substance and Sexual Activity   Drug Use No     Social History     Tobacco Use   Smoking Status Former   Smokeless Tobacco Never   Tobacco Comments    quit at age 23     Family History   Problem Relation Age of Onset   • Cancer Mother         bladder   • Heart disease Mother    • Prostate cancer Father    • Stroke Father    • Heart disease Father    • Hyperlipidemia Family        Meds/Allergies       Current Outpatient Medications:   •  albuterol (2.5 mg/3 mL) 0.083 % nebulizer solution  •  Ascorbic Acid (vitamin C) 1000 MG tablet  •  cholecalciferol (VITAMIN D3) 1,000 units tablet  •  Coenzyme Q10 (CoQ10) 100 MG CAPS  •  levothyroxine 75 mcg tablet  •  magnesium oxide (MAG-OX) 400 mg  •  melatonin 3 mg  •  Probiotic Product (CULTURELLE PROBIOTICS PO)  •  rivaroxaban (XARELTO) 20 mg tablet  •  sotalol (BETAPACE) 80 mg tablet  •  vitamin B-12 (VITAMIN B-12) 1,000 mcg tablet  •  fluticasone (FLONASE) 50 mcg/act nasal spray  •  furosemide (LASIX) 20 mg tablet  •  gabapentin (Neurontin) 100 mg capsule  •  potassium chloride (MICRO-K) 10 MEQ CR capsule  •  pravastatin (PRAVACHOL) 10 mg tablet  • sodium chloride 3 % inhalation solution  •  valACYclovir (VALTREX) 1,000 mg tablet    Allergies   Allergen Reactions   • Atorvastatin      Muscle pain   • Crestor [Rosuvastatin] Dizziness   • Vytorin [Ezetimibe-Simvastatin] Dizziness           Objective     Blood pressure 138/78, pulse 64, height 5' 9" (1.753 m), weight 62.7 kg (138 lb 3.2 oz). Body mass index is 20.41 kg/m². PHYSICAL EXAM:      General Appearance:   Alert, cooperative, no distress   HEENT:   Normocephalic, atraumatic, anicteric.     Neck:  Supple, symmetrical, trachea midline   Lungs:   Clear to auscultation bilaterally; no rales, rhonchi or wheezing; respirations unlabored    Heart[de-identified]   Regular rate and rhythm; no murmur, rub, or gallop. Abdomen:   Soft, non-tender, non-distended; normal bowel sounds; no masses, no organomegaly    Genitalia:   Deferred    Rectal:   Deferred    Extremities:  No cyanosis, clubbing or edema    Pulses:  2+ and symmetric    Skin:  No jaundice, rashes, or lesions    Lymph nodes:  No palpable cervical lymphadenopathy        Lab Results:   No visits with results within 1 Day(s) from this visit. Latest known visit with results is:   Transcribe Orders on 09/11/2023   Component Date Value   • Sputum Culture 09/11/2023 4+ Growth of    • Gram Stain Result 09/11/2023 No Polys (A)    • Gram Stain Result 09/11/2023 3+ Gram positive cocci in clusters (A)    • Gram Stain Result 09/11/2023 3+ Gram negative rods (A)    • Gram Stain Result 09/11/2023 2+ Gram positive cocci in pairs and chains (A)          Radiology Results:   No results found.

## 2023-09-19 NOTE — TELEPHONE ENCOUNTER
Our mutual patient is scheduled for procedure: EGD    On: September 26 , 2023     With: Dr. Kae Leigh MD    He/She is taking the following blood thinner: Xarelto (Rivaroxaban)    Can this be stopped        2       days prior to the procedure    Physician Approving clearance:

## 2023-09-19 NOTE — TELEPHONE ENCOUNTER
Plan to pursue EGD as planned. Cleared from pulmonology standpoint.    ----- Message from Иван Young sent at 9/19/2023 10:35 AM EDT -----  Poly Stephen,    That sounds like a great plan! He is optimized, thank you for the update.     Hector Allan

## 2023-09-19 NOTE — PATIENT INSTRUCTIONS
Scheduled date of EGD(as of today):9/26/2023  Physician performing EGD:   Location of EGD: [facility]   Instructions reviewed with patient by: anabelle   Clearances:  cardiac clearance

## 2023-09-19 NOTE — PROGRESS NOTES
[facility] - Outpatient Consultation  Daniel Earl Sr. 77 y.o. male MRN: [id number]  Encounter: [id number]          ASSESSMENT AND PLAN:      24-year-old male with history of A-fib on Xarelto, history of kidney cancer, who presents due to recent pneumonia and pulmonology concern for possible chronic aspiration component. 1. GERD  2. Dysphagia  3. Abdominal chest imaging, ?chronic aspiration  4. Chronic cough  Patient does have some improvement after starting nebulizer as recommended by pulmonologist.  He reports on and off reflux symptoms however dysphagia mainly to solid and pills of the upper esophagus. No classic signs of aspiration. Reports prior EGD many years ago. -Recommend scheduling EGD now for further evaluation of dysphagia symptoms  -We will need clearance by cardiology to hold Xarelto. -Patient is not currently on PPI and reports he would be hesitant to continue this long-term due to his concern that this was his cause of kidney cancer in the past.  However he would be agreeable for short-term courses if needed based on EGD results.    -Would also recommend barium swallow with speech due to pulmonology concern for chronic aspiration component    -I also sent a message to his recent pulmonologist to ensure okay to pursue EGD at this time versus if any further pulmonology work-up is going to be pursued first.    -I sent message to [facility] clinical pool to try to obtain his most recent EGD and colonoscopy. Follow-up in the office after EGD above.  ______________________________________________________________________    HPI:      Ángela Hogue. is a 24-year-old male with history of GERD, hypothyroidism, A-fib on Xarelto, CHF, history of kidney cancer who presents the office for symptoms of reflux and dysphagia. Patient had on and off cough throughout the year.   He ended up getting chest x-ray around 3 months ago which showed pneumonia and he completed antibiotic course. Repeat imaging showed persistent findings. He had CT scan 8/2023 showing extensive bilateral nodularity throughout both lobes. He followed up with pulmonology earlier this month with concern for possible MAC vs chronic aspiration. He was started on nebulizer and does report some improvement of his symptoms currently. He reports occasional reflux however this is rare. He does report some dysphagia for pills and solids. He does not notice any significant coughing after eating. He does report being on Prilosec for many years however believes that this was the cause of his kidney cancer therefore has not ever restarted this. Per chart review he has lost around 10 pounds in the last 6 months. Most recent lab work around 3 months ago showing normal hemoglobin and platelets. BMP unremarkable. Recent CT of the chest last month with no GI findings. Last abdominal imaging with a CT scan over 1 year ago with unremarkable liver, bowel etc.    I am unable to see his prior EGD and colonoscopy reports. The last documentation of this was around 2009 however patient believes he had them again since then. He reports he was told he no longer needs any more colonoscopies. He reports his prior EGD showed narrow esophagus. REVIEW OF SYSTEMS:    CONSTITUTIONAL: Denies any fever, chills, rigors. +10lb weight loss 6 months  HEENT: No earache or tinnitus. Denies hearing loss or visual disturbances. CARDIOVASCULAR: No chest pain or palpitations. RESPIRATORY: +cough, improving  GASTROINTESTINAL: As noted in the History of Present Illness. GENITOURINARY: No problems with urination. Denies any hematuria or dysuria. NEUROLOGIC: No dizziness or vertigo, denies headaches. MUSCULOSKELETAL: Denies any muscle or joint pain. SKIN: Denies skin rashes or itching. ENDOCRINE: Denies excessive thirst. Denies intolerance to heat or cold. PSYCHOSOCIAL: Denies depression or anxiety.  Denies any recent memory loss.        Historical Information   Past Medical History:   Diagnosis Date   • Dyslipidemia    • Erectile dysfunction of non-organic origin    • Gastroesophageal reflux    • Hyperlipidemia    • Hypothyroidism    • Liver disease    • Malignant neoplasm of kidney (HCC)     Left   • Malignant neoplasm of left kidney, except renal pelvis (HCC)    • Pure hypercholesterolemia    • Thyroid disease    • Vitamin D deficiency, unspecified      Past Surgical History:   Procedure Laterality Date   • CARDIAC PACEMAKER PLACEMENT     • CATARACT EXTRACTION Bilateral 2014   • CYSTOSCOPY     • EGD AND COLONOSCOPY  12/15/2009   • HERNIA REPAIR Left     Inguinal   • URETHRA SURGERY  05/2010    Done by Dr. Arben Chauhan History   Social History     Substance and Sexual Activity   Alcohol Use Not Currently     Social History     Substance and Sexual Activity   Drug Use No     Social History     Tobacco Use   Smoking Status Former   Smokeless Tobacco Never   Tobacco Comments    quit at age 23     Family History   Problem Relation Age of Onset   • Cancer Mother         bladder   • Heart disease Mother    • Prostate cancer Father    • Stroke Father    • Heart disease Father    • Hyperlipidemia Family        Meds/Allergies       Current Outpatient Medications:   •  albuterol (2.5 mg/3 mL) 0.083 % nebulizer solution  •  Ascorbic Acid (vitamin C) 1000 MG tablet  •  cholecalciferol (VITAMIN D3) 1,000 units tablet  •  Coenzyme Q10 (CoQ10) 100 MG CAPS  •  levothyroxine 75 mcg tablet  •  magnesium oxide (MAG-OX) 400 mg  •  melatonin 3 mg  •  Probiotic Product (CULTURELLE PROBIOTICS PO)  •  rivaroxaban (XARELTO) 20 mg tablet  •  sotalol (BETAPACE) 80 mg tablet  •  vitamin B-12 (VITAMIN B-12) 1,000 mcg tablet  •  fluticasone (FLONASE) 50 mcg/act nasal spray  •  furosemide (LASIX) 20 mg tablet  •  gabapentin (Neurontin) 100 mg capsule  •  potassium chloride (MICRO-K) 10 MEQ CR capsule  •  pravastatin (PRAVACHOL) 10 mg tablet  • sodium chloride 3 % inhalation solution  •  valACYclovir (VALTREX) 1,000 mg tablet    Allergies   Allergen Reactions   • Atorvastatin      Muscle pain   • Crestor [Rosuvastatin] Dizziness   • Vytorin [Ezetimibe-Simvastatin] Dizziness           Objective     Blood pressure 138/78, pulse 64, height 5' 9" (1.753 m), weight 62.7 kg (138 lb 3.2 oz). Body mass index is 20.41 kg/m². PHYSICAL EXAM:      General Appearance:   Alert, cooperative, no distress   HEENT:   Normocephalic, atraumatic, anicteric.     Neck:  Supple, symmetrical, trachea midline   Lungs:   Clear to auscultation bilaterally; no rales, rhonchi or wheezing; respirations unlabored    Heart[de-identified]   Regular rate and rhythm; no murmur, rub, or gallop. Abdomen:   Soft, non-tender, non-distended; normal bowel sounds; no masses, no organomegaly    Genitalia:   Deferred    Rectal:   Deferred    Extremities:  No cyanosis, clubbing or edema    Pulses:  2+ and symmetric    Skin:  No jaundice, rashes, or lesions    Lymph nodes:  No palpable cervical lymphadenopathy        Lab Results:   No visits with results within 1 Day(s) from this visit. Latest known visit with results is:   Transcribe Orders on 09/11/2023   Component Date Value   • Sputum Culture 09/11/2023 4+ Growth of    • Gram Stain Result 09/11/2023 No Polys (A)    • Gram Stain Result 09/11/2023 3+ Gram positive cocci in clusters (A)    • Gram Stain Result 09/11/2023 3+ Gram negative rods (A)    • Gram Stain Result 09/11/2023 2+ Gram positive cocci in pairs and chains (A)          Radiology Results:   No results found.

## 2023-09-29 ENCOUNTER — HOSPITAL ENCOUNTER (OUTPATIENT)
Dept: RADIOLOGY | Facility: HOSPITAL | Age: 78
Discharge: HOME | End: 2023-09-29
Payer: MEDICARE

## 2023-09-29 DIAGNOSIS — R13.10 DYSPHAGIA, UNSPECIFIED TYPE: ICD-10-CM

## 2023-09-29 DIAGNOSIS — R05.3 PERSISTENT COUGH: ICD-10-CM

## 2023-09-29 PROCEDURE — 74230 X-RAY XM SWLNG FUNCJ C+: CPT

## 2023-09-29 PROCEDURE — 92611 MOTION FLUOROSCOPY/SWALLOW: CPT | Performed by: SPEECH-LANGUAGE PATHOLOGIST

## 2023-09-29 NOTE — PROCEDURES
Speech-Language Pathology Video Barium Swallow Study        Patient Name: Luba Fajardo Sr. Today's Date: 9/29/2023     Problem List  Patient Active Problem List   Diagnosis   • Renal cell adenocarcinoma, left ([address])   • Vitamin D deficiency, unspecified   • Pure hypercholesterolemia   • Malignant neoplasm of left kidney, except renal pelvis ([address])   • Hyperlipidemia   • Hypothyroidism   • Gastroesophageal reflux   • Shortness of breath   • Persistent atrial fibrillation (HCC)   • Gastroesophageal reflux disease without esophagitis   • Mixed hyperlipidemia   • Chronic diastolic (congestive) heart failure (HCC)   • Statin intolerance   • Persistent cough   • Multifocal pneumonia       Past Medical History  Past Medical History:   Diagnosis Date   • Dyslipidemia    • Erectile dysfunction of non-organic origin    • Gastroesophageal reflux    • Hyperlipidemia    • Hypothyroidism    • Liver disease    • Malignant neoplasm of kidney (HCC)     Left   • Malignant neoplasm of left kidney, except renal pelvis (HCC)    • Pure hypercholesterolemia    • Thyroid disease    • Vitamin D deficiency, unspecified        Past Surgical History  Past Surgical History:   Procedure Laterality Date   • CARDIAC PACEMAKER PLACEMENT     • CATARACT EXTRACTION Bilateral 2014   • CYSTOSCOPY     • EGD AND COLONOSCOPY  12/15/2009   • HERNIA REPAIR Left     Inguinal   • URETHRA SURGERY  05/2010    Done by Dr. Nils Alvarenga;  Pt is a 77 y.o. male who presented for MBSS to further assess the swallow given reported increased dysphagia. He characterizes this as globus sensation and coughing ( primarily with dry solids). He reports liquid wash is beneficial. He admits to GERD/reflux sxs as well as retrograde flow ( especially when bending down/changing position). He was referred by GI and is currently pending EGD.  There is some concern from his Pulmonologist of aspiration due to persistent PNA. He also reports chronic cough and increased dysphonia since his dysphagia began. Previous VBS: none    Consistencies Assessed and Performance   Pt was seen in radiology for a Video Barium Swallow Study, seated in the upright position and viewed laterally with the following consistencies:     Administered: thin liquids, puree, soft solids and hard solids  Specific materials administered: Barium laden pudding, cookie, thin liquids, 13mm barium pill. Liquids were administered by cup and straw. Results are as follows:     **Images are available for review on PACS    Oral stage:  Lip closure: adequate  Mastication: adequate  Bolus formation: adequate  Bolus control: adequate  Transfer: adequate  Residue: none    Pharyngeal stage:  Swallow promptness: prompt  Spill to valleculae: -  Spill to pyriforms:-  Epiglottic inversion: mostly adequate however narrow pharynx with epiglottis abutting PPW at times  Laryngeal rise: adequate  Pharyngeal constriction: adequate  Vallecular retention: min with puree (WFL)  Pyriform retention:  Trace with puree/regular  PPW coating: -  Osteophytes: present near C3-C5   CP prominence: not visualized  Retropulsion from prominence: -  Transient penetration: -  Epiglottic undercoat: -  Penetration: observed with consecutive sips of thin liquids  Aspiration: none    Screening of Esophageal stage:  Brief hold up in upper esophagus which eventually cleared. No obvious strictures or reflux however todays assessment was not a formal assessment of the swallow and limited to small amounts in the upright position.      Penetration/Aspiration:  Thin: PAS - 2  Nectar: PAS-  DNT  Honey: PAS- DNT  Puree: PAS- 1  Solid: PAS- 1  Response to Aspiration: n/a  Strategies/Efficacy: single sips reduced/eliminated penetration           8-Point Penetration-Aspiration Scale   1 Material does not enter the airway   2 Material enters the airway, remains above the vocal folds, and is ejected  from the airway    3 Material enters the airway, remains above the vocal folds, and is not ejected from the airway   4 Material enters the airway, contacts the vocal folds, and is ejected from the airway   5 Material enters the airway, contacts the vocal folds, and is not ejected from the airway    6 Material enters the airway, passes below the vocal folds and is ejected into the larynx or out of the airway    7 Material enters the airway, passes below the vocal folds, and is not ejected from the trachea despite effort    8 Material enters the airway, passes below the vocal folds, and no effort is made to eject          Assessment Summary;  Pts oropharyngeal swallow function appears generally WFL at this time with the materials administered today. He is noted to have cervical osteophytes which results in narrow PES. This does not appear to impede the bolus but could result in retrograde flow/choking if bolus size is too large. Additionally, penetration was noted with consecutive cup sips increasing potential risk for aspiration- this appears to be as a result of the epiglottis abutting the PPW slowing inversion and airway closure. This risk appears eliminated with controlled sips. Recommendations; Recommend regular diet and thin liquids, with upright posture, slow rate of feeding, small bites/sips and alternating bites and sips. Recommended Form of Meds: as tolerated (no difficulty with whole pill today)     Aspiration precautions   Reflux Precautions    Consider consult with: Laryngologist for dysphonia/LPR evaluation     If a dedicated assessment of the esophagus is desired, consider esophagram/barium swallow. Results reviewed with patient with image review.       Belen Rothman M.S., [address] [facility]  Speech Language Pathologist   Available via [address] [place] [address] #[id number]  [address] [place] [address] Road #[id number]

## 2023-09-30 DIAGNOSIS — E03.9 ACQUIRED HYPOTHYROIDISM: ICD-10-CM

## 2023-10-02 RX ORDER — LEVOTHYROXINE SODIUM 0.07 MG/1
75 TABLET ORAL
Qty: 90 TABLET | Refills: 1 | Status: SHIPPED | OUTPATIENT
Start: 2023-10-02

## 2023-10-17 RX ORDER — SODIUM CHLORIDE, SODIUM LACTATE, POTASSIUM CHLORIDE, CALCIUM CHLORIDE 600; 310; 30; 20 MG/100ML; MG/100ML; MG/100ML; MG/100ML
75 INJECTION, SOLUTION INTRAVENOUS CONTINUOUS
Status: CANCELLED | OUTPATIENT
Start: 2023-10-17

## 2023-10-18 ENCOUNTER — ANESTHESIA (OUTPATIENT)
Dept: PERIOP | Facility: HOSPITAL | Age: 78
End: 2023-10-18

## 2023-10-18 ENCOUNTER — HOSPITAL ENCOUNTER (OUTPATIENT)
Dept: PERIOP | Facility: HOSPITAL | Age: 78
Setting detail: OUTPATIENT SURGERY
Discharge: HOME | End: 2023-10-18
Admitting: INTERNAL MEDICINE
Payer: MEDICARE

## 2023-10-18 ENCOUNTER — ANESTHESIA EVENT (OUTPATIENT)
Dept: PERIOP | Facility: HOSPITAL | Age: 78
End: 2023-10-18

## 2023-10-18 VITALS
DIASTOLIC BLOOD PRESSURE: 57 MMHG | RESPIRATION RATE: 18 BRPM | HEART RATE: 60 BPM | OXYGEN SATURATION: 100 % | SYSTOLIC BLOOD PRESSURE: 105 MMHG | TEMPERATURE: 97.4 F

## 2023-10-18 DIAGNOSIS — R13.10 DYSPHAGIA, UNSPECIFIED TYPE: ICD-10-CM

## 2023-10-18 PROBLEM — I10 HTN (HYPERTENSION): Status: ACTIVE | Noted: 2023-10-18

## 2023-10-18 PROBLEM — Z95.0 PACEMAKER: Status: ACTIVE | Noted: 2023-10-18

## 2023-10-18 PROCEDURE — 88305 TISSUE EXAM BY PATHOLOGIST: CPT | Performed by: PATHOLOGY

## 2023-10-18 PROCEDURE — 43239 EGD BIOPSY SINGLE/MULTIPLE: CPT | Performed by: INTERNAL MEDICINE

## 2023-10-18 RX ORDER — PROPOFOL 10 MG/ML
INJECTION, EMULSION INTRAVENOUS AS NEEDED
Status: DISCONTINUED | OUTPATIENT
Start: 2023-10-18 | End: 2023-10-18

## 2023-10-18 RX ORDER — SODIUM CHLORIDE, SODIUM LACTATE, POTASSIUM CHLORIDE, CALCIUM CHLORIDE 600; 310; 30; 20 MG/100ML; MG/100ML; MG/100ML; MG/100ML
INJECTION, SOLUTION INTRAVENOUS CONTINUOUS PRN
Status: DISCONTINUED | OUTPATIENT
Start: 2023-10-18 | End: 2023-10-18

## 2023-10-18 RX ORDER — EPHEDRINE SULFATE 50 MG/ML
INJECTION INTRAVENOUS AS NEEDED
Status: DISCONTINUED | OUTPATIENT
Start: 2023-10-18 | End: 2023-10-18

## 2023-10-18 RX ORDER — LIDOCAINE HYDROCHLORIDE 10 MG/ML
INJECTION, SOLUTION EPIDURAL; INFILTRATION; INTRACAUDAL; PERINEURAL AS NEEDED
Status: DISCONTINUED | OUTPATIENT
Start: 2023-10-18 | End: 2023-10-18

## 2023-10-18 RX ADMIN — PROPOFOL 80 MG: 10 INJECTION, EMULSION INTRAVENOUS at 15:27

## 2023-10-18 RX ADMIN — SODIUM CHLORIDE, SODIUM LACTATE, POTASSIUM CHLORIDE, AND CALCIUM CHLORIDE: .6; .31; .03; .02 INJECTION, SOLUTION INTRAVENOUS at 15:24

## 2023-10-18 RX ADMIN — PROPOFOL 40 MG: 10 INJECTION, EMULSION INTRAVENOUS at 15:30

## 2023-10-18 RX ADMIN — EPHEDRINE SULFATE 5 MG: 50 INJECTION, SOLUTION INTRAVENOUS at 15:35

## 2023-10-18 RX ADMIN — LIDOCAINE HYDROCHLORIDE 50 MG: 10 INJECTION, SOLUTION EPIDURAL; INFILTRATION; INTRACAUDAL; PERINEURAL at 15:27

## 2023-10-18 NOTE — ANESTHESIA POSTPROCEDURE EVALUATION
Post-Op Assessment Note    CV Status:  Stable  Pain Score: 0    Pain management: adequate     Mental Status:  Sleepy   Hydration Status:  Euvolemic and stable   PONV Controlled:  Controlled   Airway Patency:  Patent      Post Op Vitals Reviewed: Yes      Staff: CRNA         No notable events documented.     BP   96/52   Temp     Pulse  60   Resp   15   SpO2   100%

## 2023-10-18 NOTE — INTERVAL H&P NOTE
H&P reviewed. After examining the patient I find no changes in the patients condition since the H&P had been written.     Vitals:    10/18/23 1359   BP: 162/89   Pulse: 86   Resp: 18   Temp: (!) 97.4 °F (36.3 °C)   SpO2: 97%

## 2023-10-18 NOTE — ANESTHESIA PREPROCEDURE EVALUATION
Procedure:  EGD    Relevant Problems   CARDIO   (+) HTN (hypertension)   (+) Hyperlipidemia   (+) Mixed hyperlipidemia   (+) Pacemaker   (+) Persistent atrial fibrillation (HCC)   (+) Pure hypercholesterolemia      ENDO   (+) Hypothyroidism      GI/HEPATIC   (+) Gastroesophageal reflux   (+) Gastroesophageal reflux disease without esophagitis      /RENAL   (+) Malignant neoplasm of left kidney, except renal pelvis (HCC)   (+) Renal cell adenocarcinoma, left (HCC)      PULMONARY   (+) Multifocal pneumonia   (+) Shortness of breath        Physical Exam    Airway    Mallampati score: II  TM Distance: >3 FB  Neck ROM: full     Dental       Cardiovascular  Rhythm: irregular, Rate: normal    Pulmonary   Breath sounds clear to auscultation    Other Findings        Anesthesia Plan  ASA Score- 3     Anesthesia Type- IV sedation with anesthesia with ASA Monitors. Additional Monitors:     Airway Plan:            Plan Factors-    Chart reviewed. Patient is not a current smoker. Induction- intravenous. Postoperative Plan-     Informed Consent- Anesthetic plan and risks discussed with patient. I personally reviewed this patient with the CRNA. Discussed and agreed on the Anesthesia Plan with the CRNA. [place]

## 2023-10-25 PROCEDURE — 88305 TISSUE EXAM BY PATHOLOGIST: CPT | Performed by: PATHOLOGY

## 2023-10-26 ENCOUNTER — IN-CLINIC DEVICE VISIT (OUTPATIENT)
Dept: CARDIOLOGY CLINIC | Facility: CLINIC | Age: 78
End: 2023-10-26
Payer: MEDICARE

## 2023-10-26 DIAGNOSIS — Z95.0 PRESENCE OF PERMANENT CARDIAC PACEMAKER: Primary | ICD-10-CM

## 2023-10-26 PROCEDURE — 93280 PM DEVICE PROGR EVAL DUAL: CPT | Performed by: INTERNAL MEDICINE

## 2023-10-31 ENCOUNTER — CLINICAL SUPPORT (OUTPATIENT)
Dept: NUTRITION | Facility: HOSPITAL | Age: 78
End: 2023-10-31
Attending: INTERNAL MEDICINE
Payer: MEDICARE

## 2023-10-31 VITALS — BODY MASS INDEX: 20.29 KG/M2 | WEIGHT: 137.4 LBS

## 2023-10-31 DIAGNOSIS — E44.0 MODERATE PROTEIN-CALORIE MALNUTRITION (HCC): ICD-10-CM

## 2023-10-31 NOTE — PROGRESS NOTES
Nutrition Assessment Form    Patient Name: Ximena Gu Sr.     YOB: 1945    Sex: Male     Assessment Date: 10/31/2023  Start Time: 9 Stop Time: 10 Total Minutes: 60     Data:  Present at session: Self  and wife   Parent/Patient Concerns/reason for visit: "I had a lot going on this past year- I have no goal"   Medical Dx/Reason for Referral: malnutrition   Past Medical History:   Diagnosis Date    Dyslipidemia     Erectile dysfunction of non-organic origin     Gastroesophageal reflux     Hyperlipidemia     Hypothyroidism     Liver disease     Malignant neoplasm of kidney (HCC)     Left    Malignant neoplasm of left kidney, except renal pelvis (HCC)     Pure hypercholesterolemia     Thyroid disease     Vitamin D deficiency, unspecified        Current Outpatient Medications   Medication Sig Dispense Refill    albuterol (2.5 mg/3 mL) 0.083 % nebulizer solution Take 3 mL (2.5 mg total) by nebulization 3 (three) times a day Prior to saline neb 180 mL 3    Ascorbic Acid (vitamin C) 1000 MG tablet Take 1,000 mg by mouth daily      cholecalciferol (VITAMIN D3) 1,000 units tablet Take 1,000 Units by mouth daily      Coenzyme Q10 (CoQ10) 100 MG CAPS Take 2 capsules (200 mg total) by mouth daily 60 capsule 0    fluticasone (FLONASE) 50 mcg/act nasal spray 1 spray into each nostril daily (Patient not taking: Reported on 9/19/2023) 16 mL 0    furosemide (LASIX) 20 mg tablet Take 1 tablet (20 mg total) by mouth daily as needed (for edema/weight gain) (Patient not taking: Reported on 11/7/2022) 90 tablet 0    gabapentin (Neurontin) 100 mg capsule Take 1 capsule (100 mg total) by mouth 3 (three) times a day (Patient not taking: Reported on 9/19/2023) 30 capsule 0    levothyroxine 75 mcg tablet TAKE 1 TABLET (75 MCG TOTAL) BY MOUTH DAILY IN THE EARLY MORNING 90 tablet 1    magnesium oxide (MAG-OX) 400 mg Take 400 mg by mouth daily      melatonin 3 mg Take by mouth daily at bedtime      potassium chloride (MICRO-K) 10 MEQ CR capsule Take 2 capsules (20 mEq total) by mouth daily (Patient not taking: Reported on 9/19/2023) 180 capsule 3    pravastatin (PRAVACHOL) 10 mg tablet Take 1 tablet (10 mg total) by mouth daily (Patient not taking: Reported on 9/19/2023) 30 tablet 3    Probiotic Product (CULTURELLE PROBIOTICS PO) Take by mouth      rivaroxaban (XARELTO) 20 mg tablet Take 1 tablet (20 mg total) by mouth daily after dinner 90 tablet 3    sodium chloride 3 % inhalation solution Take 4 mL by nebulization 3 (three) times a day (Patient not taking: Reported on 9/19/2023) 240 mL 3    sotalol (BETAPACE) 80 mg tablet Take 1 tablet (80 mg total) by mouth every 12 (twelve) hours 180 tablet 3    valACYclovir (VALTREX) 1,000 mg tablet Take 1 tablet (1,000 mg total) by mouth 3 (three) times a day for 7 days (Patient not taking: Reported on 9/19/2023) 21 tablet 0    vitamin B-12 (VITAMIN B-12) 1,000 mcg tablet Take by mouth daily       No current facility-administered medications for this visit. Additional Meds/Supplements: Digestive enzyme OTC   Special Learning Needs/barriers to learning/any new barriers N/a   Height: HC Readings from Last 5 Encounters:   No data found for [facility].      Weight: Wt Readings from Last 10 Encounters:   09/19/23 62.7 kg (138 lb 3.2 oz)   09/05/23 62.6 kg (138 lb)   06/08/23 64.9 kg (143 lb)   03/02/23 66.7 kg (147 lb)   01/31/23 65.8 kg (145 lb)   01/04/23 66.2 kg (146 lb)   12/06/22 66.2 kg (146 lb)   11/07/22 64.4 kg (141 lb 14.4 oz)   10/12/22 66.7 kg (147 lb)   06/29/22 67.1 kg (148 lb)     Estimated body mass index is 20.41 kg/m² as calculated from the following:    Height as of 9/19/23: 5' 9" (1.753 m). Weight as of 9/19/23: 62.7 kg (138 lb 3.2 oz). Recent Weight Change: [x]Yes     []No  Amount:      6# loss since June; states wt of 147# in march was error  and included heavier clothes   Energy Needs:     Allergies   Allergen Reactions    Atorvastatin      Muscle pain    Crestor [Rosuvastatin] Dizziness    Vytorin [Ezetimibe-Simvastatin] Dizziness    or intolerances NKFA   Social History     Substance and Sexual Activity   Alcohol Use Not Currently    Maybe 1x/month 1 beer   Social History     Tobacco Use   Smoking Status Former   Smokeless Tobacco Never   Tobacco Comments    quit at age 23       Who shops? patient  and wife   Who cooks/cooking methods/Eating out/take out habits   patient  and wife  Cooking methods: bake/erickson/air erickson/grill/boil/other________    May eat out 1x/month   Exercise: Lawn mowing house chores  Used to walk daily but not since sick     Other: ie: Sleep habits/ stress level/ work habits household-lives with ?/ food security Bed at  and awake at 6- uses bathroom 3x/wk-  does not nap in the day  Not overly stressed  Lives with his wife   Prior Nutritional Counseling? []Yes     [x]No  When:      Why:         Diet Hx:hyperglycemic -does not skip any meals-  40oz water at least daily   Breakfast: 28oz through the morning Diet:   8-830  1.5c cheerios and 1c 2% lactaid  Enlgish muffin - butter  Water - 16oz w/meds-   Lunch:  20 oz water  12-1   buffalo burger -roll- ketchup- cheese- american  3 sweet gherkins  Or pizza slice  Potato soup pumpkin cupcake 2c lactaid milk     Dinner: 5  pork chop 4.8oz butternut squash w/diced apple butter walnuts and pecans   Cheese and spinach ravioli -chicken bone broth   Or 2.7 oz ham and 1 c peas and carrots       Snacks: AM -   PM - one pumpkin cupcake or 1 slice cheese and/or 3 malt balls  HS - n/a   Other Notes/ Initial Assessment:  Joe Gerber is here to see if he can stop losing weight per his pulmonologist's suggestion. Last October weighed 150#, my pulmonary doctor is worried about me losing further weight. I also have stomach problems- I have pain on and off over the years endoscopes have been WNL". He does admit to some constipation and takes miralax either daily or every other day with digestive enzyme.   When his fluid is less when the weather is colder he can tell his stool gets harder. This past year he did get the flu twice and PNA in March. Is eating and color has come back per wife since March. Discussed GERD guidelines, importance of regular meals and not skipping small frequent vs 3 larger to help control REFLUX. Discussed how to increase calories at meals, importance of adequate fluids, etc. Provided Underweight Nutrition Therapy and High Protein High Calorie Nutrition Therapy and GERD Nutrition Therapy rom NCM and RD name and number for home use. Updated assessment (Follow up note only):         Nutrition Diagnosis:   Involuntary weight loss  related to Physiological causes increasing nutrient needs due to prolonged catabolic illness, trauma, malabsorption as  evidenced by Poor intake, change in eating habits, early satiety, skipped meals       Any change or new dx since previous visit:     Nutrition Diagnosis:   N/a      Medical Nutrition Therapy Intervention:  [x]Individualized Meal Plan-Discussed the importance of eating 3 meals daily and not skipping, and how metabolism is affected. Also discussed adding in small snacks or 5-6 small meals daily if desired vs 3 larger ones, and appropriate options and portions. Appropriate timing of meals, including eating within 1 hr of waking and eating meals slowly 20mins/meals, minimizing mindless/boredom or habitual eating, etc was also mentioned. Fluid intake discussed and appropriate amounts and choices, 16 oz with meals and additional 32oz during the day. S/S dehydration also covered.  []Understanding Lab Values   []Basic Pathophysiology of Disease []Food/Medication Interactions   []Food Diary []Exercise   []Lifestyle/Behavior Modification Techniques []Medication, Mechanism of Action   []Label Reading: CHO/ Na/ Fat/ other_________ []Self Blood Glucose Monitoring   [x]Weight/BMI Goals: gain/lose/maintain- []Other -           Comprehension: []Excellent  []Very Good  [x]Good  []Fair   []Poor    Receptivity: []Excellent  []Very Good  [x]Good  []Fair   []Poor    Expected Compliance: []Excellent  []Very Good  [x]Good  []Fair   []Poor        Goals (initial)/ Progress made on previous goals/new goals:   5 small meals daily no skipping   2.  64 oz fluid daily   3. No follow-ups on file.   Labs:  CMP  Lab Results   Component Value Date    K 3.9 06/09/2023     06/09/2023    CO2 32 06/09/2023    BUN 18 06/09/2023    CREATININE 0.96 06/09/2023    GLUF 94 06/09/2023    CALCIUM 9.6 06/09/2023    AST 28 12/12/2022    ALT 44 12/12/2022    ALKPHOS 102 12/12/2022    EGFR 74 06/09/2023       BMP  Lab Results   Component Value Date    CALCIUM 9.6 06/09/2023    K 3.9 06/09/2023    CO2 32 06/09/2023     06/09/2023    BUN 18 06/09/2023    CREATININE 0.96 06/09/2023       Lipids  No results found for: "CHOL"  Lab Results   Component Value Date    HDL 48 06/09/2023    HDL 44 12/12/2022    HDL 55 01/12/2022     Lab Results   Component Value Date    LDLCALC 182 (H) 06/09/2023    LDLCALC 178 (H) 12/12/2022    LDLCALC 227 (H) 01/12/2022     Lab Results   Component Value Date    TRIG 89 06/09/2023    TRIG 102 12/12/2022    TRIG 89 01/12/2022     No results found for: "CHOLHDL"    Hemoglobin A1C  No results found for: "HGBA1C"    Fasting Glucose  Lab Results   Component Value Date    GLUF 94 06/09/2023       Insulin     Thyroid  No results found for: "TSH", "U4PHWZE", "T0ILMGW", "THYROIDAB"    Hepatic Function Panel  Lab Results   Component Value Date    ALT 44 12/12/2022    AST 28 12/12/2022    ALKPHOS 102 12/12/2022       Celiac Disease Antibody Panel  No results found for: "ENDOMYSIAL IGA", "GLIADIN IGA", "GLIADIN IGG", "IGA", "TISSUE TRANSGLUT AB", "TTG IGA"   Iron  No results found for: "IRON", "TIBC", "FERRITIN"         Kentrell Fabian, [address] [facility] [id number]

## 2023-11-02 ENCOUNTER — OFFICE VISIT (OUTPATIENT)
Dept: PULMONOLOGY | Facility: CLINIC | Age: 78
End: 2023-11-02
Payer: MEDICARE

## 2023-11-02 VITALS
SYSTOLIC BLOOD PRESSURE: 118 MMHG | WEIGHT: 136 LBS | OXYGEN SATURATION: 99 % | HEART RATE: 70 BPM | DIASTOLIC BLOOD PRESSURE: 68 MMHG | HEIGHT: 69 IN | TEMPERATURE: 97.4 F | BODY MASS INDEX: 20.14 KG/M2

## 2023-11-02 DIAGNOSIS — J18.9 MULTIFOCAL PNEUMONIA: Primary | ICD-10-CM

## 2023-11-02 DIAGNOSIS — R05.3 PERSISTENT COUGH: ICD-10-CM

## 2023-11-02 PROCEDURE — 99215 OFFICE O/P EST HI 40 MIN: CPT | Performed by: INTERNAL MEDICINE

## 2023-11-02 NOTE — PROGRESS NOTES
Consultation - Pulmonary Medicine   Zhao Schrader Sr. 77 y.o. male MRN: [id number]    Physician Requesting Consult: Dr Madi Young  Reason for Consult: cough, abnormal CT    Zhao Schrader Sr. is a 77 y.o. male who presents for evaluation of chronic cough and abnormal CT. Bronchiolitis on CT  Chronic Cough  Pt with cough for 8+ months. CT Chest with extensive tree in bud, scattered nodules and consolidation in RML and lingula. Dx includes MAC vs chronic aspiration. Pt has GERD and patulous esophagus with many years of dysphagia to solid food and some liquids. Undergoing GI workup, overall had "risk of aspiration" on barium swallow but with small, slow sips and small bites, no overt aspiration. Sputum culture with normal respiratory mahi but AFB not done. With mucous clearance therapy, cough and dyspnea significantly improved, and lugns sound clear on exam. Will repeat CT chest to see if multifocal nodular and tree in bud opacities improving.     - repeat CT chest in 4 weeks  - resend AFB cultures for sputum collection, provided collection specimen  --- depending on CT and cultures, will determine need for bronchoscopy  - airway clearance regimen: albuteorl followed by nebiulizer BID  --- reviewed nebulizer technique  --- nebulizer prescribed  - continue GI follow up and aspiration precautions  - f/u 6 weeks    Weight loss  Poor appetite, weight loss saw Nutrition for malnutrition/weight loss.  Has been eating better    Vaccines  - COVID unknown  - Flu due this season  - PNA UTD    Immunization History   Administered Date(s) Administered    INFLUENZA 09/22/2020, 12/06/2022    Influenza, Seasonal Vaccine, Quadrivalent, Adjuvanted, .5e 09/07/2023    Influenza, high dose seasonal 0.7 mL 12/06/2022    Pneumococcal 03/23/2009    Pneumococcal Conjugate 13-Valent 10/07/2015    Pneumococcal Conjugate PCV 7 03/23/2009    Pneumococcal Polysaccharide PPV23 12/01/2021    influenza, trivalent, adjuvanted 09/22/2020 ______________________________    HPI:    Malissa Pillai Sr. is a 77 y.o. male who presents for evaluaiton of chornic cough and abnormal CT. PMH includes GERD, Afib, CHF, RCC (left) sp cryoablation in 2020 with no recurrence. Pt reports chronic cough for 8 months. Got 10day course abx (ceftin and azithromycin) x2 which improved symptoms initially but then cough came back. Bring up mucous and phlegm, yellowish gray  Does report some dysphagia for many years, worse with pills and solid food than liquid. Also with significant GERD, PPI didn't help so he stopped using  No fevers, chills. Did have night sweats a few times in January but this resolved. Wt loss: 10-15 lbs in the last 6 months, poor appetite  Wt Readings from Last 3 Encounters:   11/02/23 61.7 kg (136 lb)   10/31/23 62.3 kg (137 lb 6.4 oz)   09/19/23 62.7 kg (138 lb 3.2 oz)     Interval Hx:  Saw GI, had EGD done. Barium swallow: overall passed but with ridsk of aspiration if doesn't eat slow small bites. 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     Current Medications:    Current Outpatient Medications:     albuterol (2.5 mg/3 mL) 0.083 % nebulizer solution, Take 3 mL (2.5 mg total) by nebulization 3 (three) times a day Prior to saline neb, Disp: 180 mL, Rfl: 3    Ascorbic Acid (vitamin C) 1000 MG tablet, Take 1,000 mg by mouth daily, Disp: , Rfl:     cholecalciferol (VITAMIN D3) 1,000 units tablet, Take 1,000 Units by mouth daily, Disp: , Rfl:     Coenzyme Q10 (CoQ10) 100 MG CAPS, Take 2 capsules (200 mg total) by mouth daily, Disp: 60 capsule, Rfl: 0    levothyroxine 75 mcg tablet, TAKE 1 TABLET (75 MCG TOTAL) BY MOUTH DAILY IN THE EARLY MORNING, Disp: 90 tablet, Rfl: 1    magnesium oxide (MAG-OX) 400 mg, Take 400 mg by mouth daily, Disp: , Rfl:     melatonin 3 mg, Take by mouth daily at bedtime, Disp: , Rfl:     Probiotic Product (CULTURELLE PROBIOTICS PO), Take by mouth, Disp: , Rfl:     rivaroxaban (XARELTO) 20 mg tablet, Take 1 tablet (20 mg total) by mouth daily after dinner, Disp: 90 tablet, Rfl: 3    sodium chloride 3 % inhalation solution, Take 4 mL by nebulization 3 (three) times a day, Disp: 240 mL, Rfl: 3    sotalol (BETAPACE) 80 mg tablet, Take 1 tablet (80 mg total) by mouth every 12 (twelve) hours, Disp: 180 tablet, Rfl: 3    vitamin B-12 (VITAMIN B-12) 1,000 mcg tablet, Take by mouth daily, Disp: , Rfl:     fluticasone (FLONASE) 50 mcg/act nasal spray, 1 spray into each nostril daily (Patient not taking: Reported on 9/19/2023), Disp: 16 mL, Rfl: 0    furosemide (LASIX) 20 mg tablet, Take 1 tablet (20 mg total) by mouth daily as needed (for edema/weight gain) (Patient not taking: Reported on 11/7/2022), Disp: 90 tablet, Rfl: 0    gabapentin (Neurontin) 100 mg capsule, Take 1 capsule (100 mg total) by mouth 3 (three) times a day (Patient not taking: Reported on 9/19/2023), Disp: 30 capsule, Rfl: 0    potassium chloride (MICRO-K) 10 MEQ CR capsule, Take 2 capsules (20 mEq total) by mouth daily (Patient not taking: Reported on 9/19/2023), Disp: 180 capsule, Rfl: 3    pravastatin (PRAVACHOL) 10 mg tablet, Take 1 tablet (10 mg total) by mouth daily (Patient not taking: Reported on 9/19/2023), Disp: 30 tablet, Rfl: 3    valACYclovir (VALTREX) 1,000 mg tablet, Take 1 tablet (1,000 mg total) by mouth 3 (three) times a day for 7 days, Disp: 21 tablet, Rfl: 0    Historical Information   Past Medical History:   Diagnosis Date    Dyslipidemia     Erectile dysfunction of non-organic origin     Gastroesophageal reflux     Hyperlipidemia     Hypothyroidism     Liver disease     Malignant neoplasm of kidney (HCC) Left    Malignant neoplasm of left kidney, except renal pelvis (HCC)     Pure hypercholesterolemia     Thyroid disease     Vitamin D deficiency, unspecified      Past Surgical History:   Procedure Laterality Date    CARDIAC PACEMAKER PLACEMENT      CATARACT EXTRACTION Bilateral 2014    CYSTOSCOPY      EGD AND COLONOSCOPY  12/15/2009    HERNIA REPAIR Left     Inguinal    URETHRA SURGERY  05/2010    Done by Dr. Mamie Oneal History     Tobacco Use   Smoking Status Former   Smokeless Tobacco Never   Tobacco Comments    quit at age 23       Family History:   Family History   Problem Relation Age of Onset    Cancer Mother         bladder    Heart disease Mother     Prostate cancer Father     Stroke Father     Heart disease Father     Hyperlipidemia Family          PhysicalExamination:  Vitals:   Ht 5' 9" (1.753 m)   Wt 61.7 kg (136 lb)   BMI 20.08 kg/m²     Appearance -- NAD, speaking full sentences  HEENT -- anicteric sclera, clear OP, MMM  Neck -- no JVD  Heart -- RRR, no murmurs  Lungs -- clear lungs  Abdomen -- soft, NTND, +bs  Extremities -- WWP, no LE edema  Skin -- no rash  Neuro -- A&Ox3, wnl  Psych -- no obvious depression or hallucination        Diagnostic Data:  Labs: I personally reviewed the most recent laboratory data pertinent to today's visit    Lab Results   Component Value Date    WBC 5.74 06/09/2023    HGB 12.8 06/09/2023    HCT 38.6 06/09/2023     (H) 06/09/2023     06/09/2023     Lab Results   Component Value Date    CALCIUM 9.6 06/09/2023    K 3.9 06/09/2023    CO2 32 06/09/2023     06/09/2023    BUN 18 06/09/2023    CREATININE 0.96 06/09/2023     No results found for: "IGE"  Lab Results   Component Value Date    ALT 44 12/12/2022    AST 28 12/12/2022    ALKPHOS 102 12/12/2022       PFT results: The most recent pulmonary function tests were reviewed.   NONE    Imaging:  I personally reviewed the images on the [facility] system pertinent to today's visit  CT Chest 8/2023:  Extensive bilateral tree-in-bud nodularity and confluent nodules involving all lobes of both lungs with consolidation in the right middle lobe and inferior lingula, present since June 2023, new since June 2021. This could be atypical mycobacterial infection.  Recommend pulmonary referral.  Also with patulous esophagus          Angelo Tapia MD  [facility]

## 2023-11-04 ENCOUNTER — APPOINTMENT (OUTPATIENT)
Dept: LAB | Facility: CLINIC | Age: 78
End: 2023-11-04
Payer: MEDICARE

## 2023-11-04 DIAGNOSIS — J18.9 MULTIFOCAL PNEUMONIA: ICD-10-CM

## 2023-11-04 PROCEDURE — 87116 MYCOBACTERIA CULTURE: CPT

## 2023-11-04 PROCEDURE — 87206 SMEAR FLUORESCENT/ACID STAI: CPT

## 2023-11-07 LAB
MYCOBACTERIUM SPEC CULT: NORMAL
RHODAMINE-AURAMINE STN SPEC: NORMAL

## 2023-11-14 LAB
MYCOBACTERIUM SPEC CULT: NORMAL
RHODAMINE-AURAMINE STN SPEC: NORMAL

## 2023-11-21 LAB
MYCOBACTERIUM SPEC CULT: NORMAL
RHODAMINE-AURAMINE STN SPEC: NORMAL

## 2023-11-28 LAB
MYCOBACTERIUM SPEC CULT: NORMAL
RHODAMINE-AURAMINE STN SPEC: NORMAL

## 2023-12-01 DIAGNOSIS — I48.19 PERSISTENT ATRIAL FIBRILLATION (HCC): ICD-10-CM

## 2023-12-01 DIAGNOSIS — I48.20 CHRONIC ATRIAL FIBRILLATION (HCC): ICD-10-CM

## 2023-12-01 RX ORDER — SOTALOL HYDROCHLORIDE 80 MG/1
80 TABLET ORAL EVERY 12 HOURS SCHEDULED
Qty: 180 TABLET | Refills: 3 | Status: SHIPPED | OUTPATIENT
Start: 2023-12-01

## 2023-12-05 LAB
MYCOBACTERIUM SPEC CULT: NORMAL
RHODAMINE-AURAMINE STN SPEC: NORMAL

## 2023-12-06 ENCOUNTER — HOSPITAL ENCOUNTER (OUTPATIENT)
Dept: CT IMAGING | Facility: HOSPITAL | Age: 78
Discharge: HOME | End: 2023-12-06
Attending: INTERNAL MEDICINE
Payer: MEDICARE

## 2023-12-06 DIAGNOSIS — J18.9 MULTIFOCAL PNEUMONIA: ICD-10-CM

## 2023-12-06 PROCEDURE — 71250 CT THORAX DX C-: CPT

## 2023-12-06 PROCEDURE — G1004 CDSM NDSC: HCPCS

## 2023-12-12 LAB
MYCOBACTERIUM SPEC CULT: NORMAL
RHODAMINE-AURAMINE STN SPEC: NORMAL

## 2023-12-19 LAB
MYCOBACTERIUM SPEC CULT: NORMAL
RHODAMINE-AURAMINE STN SPEC: NORMAL

## 2024-01-08 ENCOUNTER — OFFICE VISIT (OUTPATIENT)
Dept: CARDIOLOGY CLINIC | Facility: CLINIC | Age: 79
End: 2024-01-08
Payer: MEDICARE

## 2024-01-08 ENCOUNTER — TELEPHONE (OUTPATIENT)
Dept: CARDIOLOGY CLINIC | Facility: CLINIC | Age: 79
End: 2024-01-08

## 2024-01-08 VITALS
BODY MASS INDEX: 21.03 KG/M2 | DIASTOLIC BLOOD PRESSURE: 64 MMHG | WEIGHT: 142 LBS | HEIGHT: 69 IN | OXYGEN SATURATION: 99 % | HEART RATE: 65 BPM | SYSTOLIC BLOOD PRESSURE: 116 MMHG

## 2024-01-08 DIAGNOSIS — I48.19 PERSISTENT ATRIAL FIBRILLATION (HCC): Primary | ICD-10-CM

## 2024-01-08 DIAGNOSIS — I48.20 CHRONIC ATRIAL FIBRILLATION (HCC): ICD-10-CM

## 2024-01-08 DIAGNOSIS — I50.32 CHRONIC DIASTOLIC (CONGESTIVE) HEART FAILURE (HCC): ICD-10-CM

## 2024-01-08 PROCEDURE — 99214 OFFICE O/P EST MOD 30 MIN: CPT | Performed by: INTERNAL MEDICINE

## 2024-01-08 PROCEDURE — 93000 ELECTROCARDIOGRAM COMPLETE: CPT | Performed by: INTERNAL MEDICINE

## 2024-01-08 RX ORDER — SOTALOL HYDROCHLORIDE 80 MG/1
80 TABLET ORAL EVERY 12 HOURS SCHEDULED
Qty: 180 TABLET | Refills: 3 | Status: SHIPPED | OUTPATIENT
Start: 2024-01-08

## 2024-01-08 NOTE — TELEPHONE ENCOUNTER
Patient was provided with 8 sample bottles of Xarelto 10 mg (4 week supply at 20 mg daily).   4 bottles  Lot#: [id number]  Exp: 06/2024    4 bottles  Lot#: [id number]  Exp: 09/2024

## 2024-01-08 NOTE — PROGRESS NOTES
HEART AND VASCULAR  CARDIAC ELECTROPHYSIOLOGY   [facility]  [facility]    Outpatient   Follow-up  Today's Date: 01/08/24        Patient name: Simba Dawn Sr.  YOB: 1945  Sex: male         Chief Complaint: f/u afib/pacemaker      ASSESSMENT:  Problem List Items Addressed This Visit          Cardiovascular and Mediastinum    Persistent atrial fibrillation (HCC) - Primary    Relevant Medications    rivaroxaban (XARELTO) 20 mg tablet    sotalol (BETAPACE) 80 mg tablet    Other Relevant Orders    POCT ECG    Chronic diastolic (congestive) heart failure (HCC)    Relevant Medications    sotalol (BETAPACE) 80 mg tablet     Other Visit Diagnoses       Chronic atrial fibrillation (HCC)        Relevant Medications    rivaroxaban (XARELTO) 20 mg tablet    sotalol (BETAPACE) 80 mg tablet          79 yo  1) h/o persistent afib, in NSR since DCCV and on sotalol 80mg bid  2) Dual chamber ppm AV payal disease. Slow afib. 100% RV paced w LPFB lead  3) Dyslipidemia, LDL >300 and intolerant to statins in past but now on pravastatin 10mg daily and   4) Atheroscloerosis/CAD 50% Mid LAD      QTc  457ms.       PLAN:  1>DOing great with no afib, on sotalol cont  curretn dose.   2. Cont pravastain 10mg daily (didn't tolerate other statins).     Cont Sotalol,, xarelto        Orders Placed This Encounter   Procedures    POCT ECG     Medications Discontinued During This Encounter   Medication Reason    rivaroxaban (XARELTO) 20 mg tablet Reorder    sotalol (BETAPACE) 80 mg tablet Reorder         .............................................................................................    HPI/Subjective:   79 yo  1) h/o persistent afib, in NSR since DCCV and on sotalol 80mg bid  2) Dual chamber ppm AV payal disease. Slow afib. 100% RV paced w LPFB lead  3) Dyslipidemia, LDL >300 and intolerant to statins in past but now on pravastatin 10mg daily and   4) Atheroscloerosis/CAD 50%  Mid LAD      Dealt with bad Pneumonia last spring, but has recovered.       Past Medical History:   Diagnosis Date    Dyslipidemia     Erectile dysfunction of non-organic origin     Gastroesophageal reflux     Hyperlipidemia     Hypothyroidism     Liver disease     Malignant neoplasm of kidney (HCC)     Left    Malignant neoplasm of left kidney, except renal pelvis (HCC)     Pure hypercholesterolemia     Thyroid disease     Vitamin D deficiency, unspecified        Allergies   Allergen Reactions    Atorvastatin      Muscle pain    Crestor [Rosuvastatin] Dizziness    Vytorin [Ezetimibe-Simvastatin] Dizziness     I reviewed the Home Medication list and Allergies in the chart.   Scheduled Meds:  Current Outpatient Medications   Medication Sig Dispense Refill    albuterol (2.5 mg/3 mL) 0.083 % nebulizer solution Take 3 mL (2.5 mg total) by nebulization 3 (three) times a day Prior to saline neb 180 mL 3    Ascorbic Acid (vitamin C) 1000 MG tablet Take 1,000 mg by mouth daily      cholecalciferol (VITAMIN D3) 1,000 units tablet Take 1,000 Units by mouth daily      Coenzyme Q10 (CoQ10) 100 MG CAPS Take 2 capsules (200 mg total) by mouth daily 60 capsule 0    levothyroxine 75 mcg tablet TAKE 1 TABLET (75 MCG TOTAL) BY MOUTH DAILY IN THE EARLY MORNING 90 tablet 1    magnesium oxide (MAG-OX) 400 mg Take 400 mg by mouth daily      melatonin 3 mg Take by mouth daily at bedtime      Probiotic Product (CULTURELLE PROBIOTICS PO) Take by mouth      rivaroxaban (XARELTO) 20 mg tablet Take 1 tablet (20 mg total) by mouth daily after dinner 90 tablet 3    sodium chloride 3 % inhalation solution Take 4 mL by nebulization 3 (three) times a day 240 mL 3    sotalol (BETAPACE) 80 mg tablet Take 1 tablet (80 mg total) by mouth every 12 (twelve) hours 180 tablet 3    vitamin B-12 (VITAMIN B-12) 1,000 mcg tablet Take by mouth daily      fluticasone (FLONASE) 50 mcg/act nasal spray 1 spray into each nostril daily (Patient not taking: Reported  on 9/19/2023) 16 mL 0    furosemide (LASIX) 20 mg tablet Take 1 tablet (20 mg total) by mouth daily as needed (for edema/weight gain) (Patient not taking: Reported on 11/7/2022) 90 tablet 0    gabapentin (Neurontin) 100 mg capsule Take 1 capsule (100 mg total) by mouth 3 (three) times a day (Patient not taking: Reported on 9/19/2023) 30 capsule 0    potassium chloride (MICRO-K) 10 MEQ CR capsule Take 2 capsules (20 mEq total) by mouth daily (Patient not taking: Reported on 9/19/2023) 180 capsule 3    pravastatin (PRAVACHOL) 10 mg tablet Take 1 tablet (10 mg total) by mouth daily (Patient not taking: Reported on 9/19/2023) 30 tablet 3    valACYclovir (VALTREX) 1,000 mg tablet Take 1 tablet (1,000 mg total) by mouth 3 (three) times a day for 7 days (Patient not taking: Reported on 1/8/2024) 21 tablet 0     No current facility-administered medications for this visit.     PRN Meds:.        Family History   Problem Relation Age of Onset    Cancer Mother         bladder    Heart disease Mother     Prostate cancer Father     Stroke Father     Heart disease Father     Hyperlipidemia Family        Social History     Socioeconomic History    Marital status: /Civil Union     Spouse name: Not on file    Number of children: Not on file    Years of education: Not on file    Highest education level: Not on file   Occupational History    Not on file   Tobacco Use    Smoking status: Former    Smokeless tobacco: Never    Tobacco comments:     quit at age 20   Vaping Use    Vaping status: Former   Substance and Sexual Activity    Alcohol use: Not Currently    Drug use: No    Sexual activity: Not on file   Other Topics Concern    Not on file   Social History Narrative    Not on file     Social Determinants of Health     Financial Resource Strain: Low Risk  (6/8/2023)    Overall Financial Resource Strain (CARDIA)     Difficulty of Paying Living Expenses: Not hard at all   Food Insecurity: Not on file   Transportation Needs: No  "Transportation Needs (6/8/2023)    PRAPARE - Transportation     Lack of Transportation (Medical): No     Lack of Transportation (Non-Medical): No   Physical Activity: Not on file   Stress: Not on file   Social Connections: Not on file   Intimate Partner Violence: Not on file   Housing Stability: Not on file         OBJECTIVE:    /64 (BP Location: Left arm, Patient Position: Sitting, Cuff Size: Standard)   Pulse 65   Ht 5' 9\" (1.753 m)   Wt 64.4 kg (142 lb)   SpO2 99%   BMI 20.97 kg/m²   Vitals:    01/08/24 0936   Weight: 64.4 kg (142 lb)     GEN: No acute distress, Alert and oriented, well appearing  HEENT:External ears normal, oral pharynx clear, mucous membranes moist  EYES: Pupils equal, sclera anicteric, midline, normal conjuctiva  NECK: No JVD, supple, no obvious masses or thryomegaly or goiter  CARDIOVASCULAR:  RRR, No murmur, rub, gallops S1,S2  LUNGS: Clear To auscultation bilaterally, normal effort, no rales, rhonchi, crackles   ABDOMEN:  nondistended,  without obvious organomegaly or ascites  EXTREMITIES/VASCULAR:  No edema. warm an well perfused.  PSYCH: Normal Affect,  linear speech pattern without evidence of psychosis.   NEURO: Grossly intact, moving all extremiteis equal, face symmetric, alert and responsive, no obvious focal defecits   GAIT: Ambulates normally without difficulty  HEME: No bleeding, bruising, petechia, purpura   SKIN: No significant rashes on visibile skin, warm, no diaphoresis or pallor.     Lab Results:       LABS:      Chemistry        Component Value Date/Time    K 3.9 06/09/2023 0838     06/09/2023 0838    CO2 32 06/09/2023 0838    BUN 18 06/09/2023 0838    CREATININE 0.96 06/09/2023 0838        Component Value Date/Time    CALCIUM 9.6 06/09/2023 0838    ALKPHOS 102 12/12/2022 0901    AST 28 12/12/2022 0901    ALT 44 12/12/2022 0901            No results found for: \"CHOL\"  Lab Results   Component Value Date    HDL 48 06/09/2023    HDL 44 12/12/2022    HDL 55 " "2022     Lab Results   Component Value Date    LDLCALC 182 (H) 2023    LDLCALC 178 (H) 2022    LDLCALC 227 (H) 2022     Lab Results   Component Value Date    TRIG 89 2023    TRIG 102 2022    TRIG 89 2022     No results found for: \"CHOLHDL\"    IMAGING: No results found.     Cardiac testing:   Results for orders placed during the hospital encounter of 21    Echo complete with contrast if indicated    [facility]  [address]  [place] [address]  [phone]    Transthoracic Echocardiogram  2D, M-mode, Doppler, and Color Doppler    Study date:  24-Mar-2021    Patient: KAYDEN PIPER  MR number: [id number]  Account number: [id number]  : 1945  Age: 76 years  Gender: Male  Status: Outpatient  Location: [facility]  Height: 69 in  Weight: 149.6 lb  BP: 122/ 72 mmHg    Indications: Atrial fib SOB    Diagnoses: I48.0 - Atrial fibrillation    Sonographer:  RACH Kim  Referring Physician:  Arias Montaño MD  Group:  [facility]  Interpreting Physician:  Lisbeth Hines DO    SUMMARY    PROCEDURE INFORMATION:  This was a technically difficult study.    LEFT VENTRICLE:  Systolic function was normal. Ejection fraction was estimated to be 55 %.  Although no diagnostic regional wall motion abnormality was identified, this possibility cannot be completely excluded on the basis of this study.    RIGHT VENTRICLE:  The ventricle was mildly to moderately dilated.  Systolic function was mildly reduced.  Wall thickness was increased.    LEFT ATRIUM:  The atrium was dilated.    RIGHT ATRIUM:  The atrium was dilated.    MITRAL VALVE:  There was moderate regurgitation.  The regurgitant jet was centrally directed.    AORTIC VALVE:  There was mild regurgitation.    TRICUSPID VALVE:  There was moderate regurgitation.  Estimated peak PA pressure was 41 mmHg.  The findings suggest mild pulmonary " hypertension.    PULMONIC VALVE:  There was mild regurgitation.    IVC, HEPATIC VEINS:  The inferior vena cava was mildly dilated.  Respirophasic changes were blunted (less than 50% variation).    HISTORY: PRIOR HISTORY: HLD, hypothyroid    PROCEDURE: The study was performed in the [facility]. This was a routine study. The transthoracic approach was used. The study included complete 2D imaging, M-mode, complete spectral Doppler, and color Doppler. The  heart rate was 71 bpm, at the start of the study. Images were obtained from the parasternal, apical, subcostal, and suprasternal notch acoustic windows. Echocardiographic views were limited due to poor acoustic window availability. This  was a technically difficult study.    LEFT VENTRICLE: Size was normal. Systolic function was normal. Ejection fraction was estimated to be 55 %. Although no diagnostic regional wall motion abnormality was identified, this possibility cannot be completely excluded on the basis  of this study. Wall thickness was normal. DOPPLER: Normal sinus rhythm was absent. The study was not technically sufficient to allow evaluation of LV diastolic function.    RIGHT VENTRICLE: The ventricle was mildly to moderately dilated. Systolic function was mildly reduced. Wall thickness was increased.    LEFT ATRIUM: The atrium was dilated.    RIGHT ATRIUM: The atrium was dilated.    MITRAL VALVE: Valve structure was normal. There was normal leaflet separation. DOPPLER: The transmitral velocity was within the normal range. There was no evidence for stenosis. There was moderate regurgitation. The regurgitant jet was  centrally directed.    AORTIC VALVE: The valve was trileaflet. Leaflets exhibited normal cuspal separation and sclerosis. DOPPLER: Transaortic velocity was within the normal range. There was no evidence for stenosis. There was mild regurgitation.    TRICUSPID VALVE: The valve structure was normal. There was normal leaflet  separation. DOPPLER: The transtricuspid velocity was within the normal range. There was no evidence for stenosis. There was moderate regurgitation. Pulmonary artery  systolic pressure was mildly increased. Estimated peak PA pressure was 41 mmHg. The findings suggest mild pulmonary hypertension.    PULMONIC VALVE: Leaflets exhibited normal thickness, no calcification, and normal cuspal separation. DOPPLER: The transpulmonic velocity was within the normal range. There was mild regurgitation.    PERICARDIUM: There was no pericardial effusion. The pericardium was normal in appearance.    AORTA: The root exhibited normal size.    SYSTEMIC VEINS: IVC: The inferior vena cava was mildly dilated. Respirophasic changes were blunted (less than 50% variation).    SYSTEM MEASUREMENT TABLES    2D  %FS: 32.74 %  Ao Diam: 3.49 cm  Ao asc: 3.54 cm  EDV(Teich): 101.42 ml  EF(Teich): 61.17 %  ESV(Teich): 39.38 ml  IVSd: 0.71 cm  LA Diam: 4.63 cm  LVIDd: 4.68 cm  LVIDs: 3.15 cm  LVPWd: 0.73 cm  SV(Teich): 62.04 ml    CW  AV MaxP.84 mmHg  AV Vmax: 0.68 m/s  PV Vmax: 0.75 m/s  PV maxP.25 mmHg  TR MaxP.6 mmHg  TR Vmax: 2.53 m/s    MM  TAPSE: 1.56 cm    PW  E' Sept: 0.1 m/s  E/E' Sept: 6.96  LVOT Vmax: 0.48 m/s  LVOT maxP.93 mmHg  MV Dec [place]: 5.16 m/s2  MV DecT: 135.86 ms  MV E [place]: 0.69 m/s  MV PHT: 39.4 ms  MVA By PHT: 5.58 cm2  RVOT Vmax: 0.5 m/s  RVOT maxP mmHg    In[employer]oci[employer] Commission Accredited Echocardiography Laboratory    Prepared and electronically signed by    Lisbeth Hines DO  Signed 24-Mar-2021 09:01:07    Results for orders placed during the hospital encounter of 21    [facility]    Narrative  [address] [address] [phone]    Transesophageal Echocardiogram  2D, Doppler, and Color Doppler    Study date:  2021    Patient: KAYDEN PIPER  MR number: [id number]  Account number: [id number]  : 1945  Age: 76 years  Gender:  Male  Status: Inpatient  Location: Cath lab  Height: 69 in  Weight: 148 lb  BP: 99646/ 72 mmHg    Indications: A-Fib    Diagnoses: I48.0 - Atrial fibrillation    Sonographer:  RACH Gonzalez  Interpreting Physician:  Ganga Newman MD  Primary Physician:  Arias Montaño MD  Referring Physician:  Ganga Newman MD  Group:  [facility]    SUMMARY    LEFT VENTRICLE:  Systolic function was normal. Ejection fraction was estimated to be 55 %.    LEFT ATRIUM:  The atrium was mildly dilated.    LEFT ATRIAL APPENDAGE:  The size was normal.  The function was mildly reduced (mildly reduced emptying velocity).  No thrombus was identified.    ATRIAL SEPTUM:  No defect or patent foramen ovale was identified.    MITRAL VALVE:  There was mild regurgitation.    AORTIC VALVE:  There was trace regurgitation.    TRICUSPID VALVE:  There was mild regurgitation.    HISTORY: PRIOR HISTORY: HLD and Hypothyroidism    PROCEDURE: The procedure was performed in the catheterization laboratory. This was a routine study. The risks and alternatives of the procedure were explained to the patient and informed consent was obtained. The transesophageal approach  was used. The study included complete 2D imaging, limited spectral Doppler, and color Doppler. The heart rate was 55 bpm, at the start of the study. An adult omniplane probe was inserted by the attending cardiologist. Intubated with ease.  One intubation attempt(s). There was no blood detected on the probe. Image quality was good. There were no complications during the procedure. MEDICATIONS: Anesthesia administered by anesthesia team.    LEFT VENTRICLE: Size was normal. Systolic function was normal. Ejection fraction was estimated to be 55 %.    RIGHT VENTRICLE: The size was normal. Systolic function was normal. Wall thickness was normal.    LEFT ATRIUM: The atrium was mildly dilated. No thrombus was identified. APPENDAGE: The size was normal. No thrombus was  identified. DOPPLER: The function was mildly reduced (mildly reduced emptying velocity).    ATRIAL SEPTUM: No defect or patent foramen ovale was identified.    RIGHT ATRIUM: Size was normal. No thrombus was identified.    MITRAL VALVE: Valve structure was normal. There was normal leaflet separation. There was no echocardiographic evidence of vegetation. DOPPLER: There was mild regurgitation.    AORTIC VALVE: The valve was trileaflet. Leaflets exhibited normal thickness and normal cuspal separation. There was no echocardiographic evidence of vegetation. DOPPLER: There was trace regurgitation.    TRICUSPID VALVE: The valve structure was normal. There was normal leaflet separation. There was no echocardiographic evidence of vegetation. DOPPLER: There was mild regurgitation.    PERICARDIUM: There was no pericardial effusion. The pericardium was normal in appearance.    [employer] Commission Accredited Echocardiography Laboratory    Prepared and electronically signed by    Ganga Newman MD  Signed 29-Jun-2021 14:24:23    No results found for this or any previous visit.    No results found for this or any previous visit.          I reviewed and interpreted the following LABS/EKG/TELE/IMAGING and below is summary of my interpretation (if data available):        Current EKG and Rhythm Strip: AV pacing w narrow QRS.  Qtc 457ms    PPM check no afib.

## 2024-01-12 DIAGNOSIS — R05.3 PERSISTENT COUGH: ICD-10-CM

## 2024-01-12 DIAGNOSIS — J18.9 MULTIFOCAL PNEUMONIA: ICD-10-CM

## 2024-01-12 RX ORDER — ALBUTEROL SULFATE 2.5 MG/3ML
2.5 SOLUTION RESPIRATORY (INHALATION)
Qty: 180 ML | Refills: 1 | Status: SHIPPED | OUTPATIENT
Start: 2024-01-12

## 2024-01-12 NOTE — TELEPHONE ENCOUNTER
Reason for call:   [x] Refill   [] Prior Auth  [] Other:     Office:   [] PCP/Provider -   [x] Specialty/Provider - pulmonolgy    Medication: albuterol 2.5/3mL     Dose/Frequency: 2-3 times a day    Quantity: 180    Pharmacy: [facility]/pharmacy #[id number]  [place] - [address] [phone]     Does the patient have enough for 3 days?   [x] Yes   [] No - Send as HP to POD

## 2024-01-22 ENCOUNTER — OFFICE VISIT (OUTPATIENT)
Dept: PULMONOLOGY | Facility: CLINIC | Age: 79
End: 2024-01-22
Payer: MEDICARE

## 2024-01-22 VITALS
BODY MASS INDEX: 20.88 KG/M2 | OXYGEN SATURATION: 94 % | HEIGHT: 69 IN | WEIGHT: 141 LBS | SYSTOLIC BLOOD PRESSURE: 114 MMHG | DIASTOLIC BLOOD PRESSURE: 58 MMHG | HEART RATE: 60 BPM

## 2024-01-22 DIAGNOSIS — R06.2 WHEEZING: ICD-10-CM

## 2024-01-22 DIAGNOSIS — J18.9 MULTIFOCAL PNEUMONIA: Primary | ICD-10-CM

## 2024-01-22 DIAGNOSIS — R05.3 PERSISTENT COUGH: ICD-10-CM

## 2024-01-22 PROCEDURE — 99214 OFFICE O/P EST MOD 30 MIN: CPT | Performed by: INTERNAL MEDICINE

## 2024-01-22 RX ORDER — ALBUTEROL SULFATE 2.5 MG/3ML
2.5 SOLUTION RESPIRATORY (INHALATION)
Qty: 270 ML | Refills: 2 | Status: SHIPPED | OUTPATIENT
Start: 2024-01-22 | End: 2024-04-21

## 2024-01-22 NOTE — PROGRESS NOTES
"    Consultation - Pulmonary Medicine   Simba Dawn Sr. 79 y.o. male MRN: [id number]    Physician Requesting Consult: Dr Terrazas  Reason for Consult: cough, abnormal CT    Simba Dawn Sr. is a 79 y.o. male who presents for evaluation of chronic cough and abnormal CT.    Bronchiolitis on CT  Chronic Cough  Wheezing  Pt with cough for 8+ months. CT Chest with extensive tree in bud, scattered nodules and consolidation in RML and lingula. Dx includes MAC vs chronic aspiration. Pt has GERD and patulous esophagus with many years of dysphagia to solid food and some liquids. Undergoing GI workup, overall had \"risk of aspiration\" on barium swallow but with small, slow sips and small bites, no overt aspiration.   Sputum culture with normal respiratory mahi but AFB not done. With mucous clearance therapy, cough and dyspnea significantly improved, and lugns sound clear on exam. CT Chest with waxing and waning reticulonodular infiltrates. Repeat AFB sputum negative.     - discussed risks and benefits of bronchi and BAL to send for micro, because pt feels cough and Sob improving and no infectious symptoms, wants to hold off for now  - airway clearance regimen: albuterol followed by 3% saline nebulizer BID  --- reviewed nebulizer technique  --- has nebulizer   --- reordered albuterol (needs PA)  - continue GI follow up and aspiration precautions  - CT chest 6 months from prior     f/u 3 month     Weight loss  Poor appetite, weight loss saw Nutrition for malnutrition/weight loss. Has been eating better and weight stable    Vaccines  - COVID unknown  - Flu UTD  - PNA UTD    Immunization History   Administered Date(s) Administered    INFLUENZA 09/22/2020, 12/06/2022    Influenza, Seasonal Vaccine, Quadrivalent, Adjuvanted, .5e 09/07/2023    Influenza, high dose seasonal 0.7 mL 12/06/2022    Pneumococcal 03/23/2009    Pneumococcal Conjugate 13-Valent 10/07/2015    Pneumococcal Conjugate PCV 7 03/23/2009    Pneumococcal " Polysaccharide PPV23 12/01/2021    influenza, trivalent, adjuvanted 09/22/2020      I have spent a total time of 30 minutes on 01/22/24 in caring for this patient including Diagnostic results, Prognosis, Risks and benefits of tx options, Instructions for management, Patient and family education, Importance of tx compliance, Risk factor reductions, Impressions, Counseling / Coordination of care, Documenting in the medical record, Reviewing / ordering tests, medicine, procedures  , and Obtaining or reviewing history  .   ______________________________    Interval Hx:  No fevers always a buit cold    Still coughing after eating snd drinking but minor  Using albuterol + saline BID and only a bit of mucous coming up  No blood  Weight has bal stable    HPI:    Simba Kaplanjosefannia . is a 79 y.o. male who presents for evaluaiton of chornic cough and abnormal CT. PMH includes GERD, Afib, CHF, RCC (left) sp cryoablation in 2020 with no recurrence.    Pt reports chronic cough for 8 months. Got 10day course abx (ceftin and azithromycin) x2 which improved symptoms initially but then cough came back.  Bring up mucous and phlegm, yellowish gray  Does report some dysphagia for many years, worse with pills and solid food than liquid. Also with significant GERD, PPI didn't help so he stopped using  No fevers, chills. Did have night sweats a few times in January but this resolved.   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    Current Medications:    Current Outpatient Medications:     albuterol (2.5 mg/3 mL) 0.083 % nebulizer solution, Take 3 mL (2.5 mg  total) by nebulization 3 (three) times a day Prior to saline neb, Disp: 270 mL, Rfl: 2    Ascorbic Acid (vitamin C) 1000 MG tablet, Take 1,000 mg by mouth daily, Disp: , Rfl:     cholecalciferol (VITAMIN D3) 1,000 units tablet, Take 1,000 Units by mouth daily, Disp: , Rfl:     Coenzyme Q10 (CoQ10) 100 MG CAPS, Take 2 capsules (200 mg total) by mouth daily, Disp: 60 capsule, Rfl: 0    Probiotic Product (CULTURELLE PROBIOTICS PO), Take by mouth, Disp: , Rfl:     rivaroxaban (XARELTO) 20 mg tablet, Take 1 tablet (20 mg total) by mouth daily after dinner, Disp: 90 tablet, Rfl: 3    sodium chloride 3 % inhalation solution, Take 4 mL by nebulization 3 (three) times a day, Disp: 240 mL, Rfl: 3    sotalol (BETAPACE) 80 mg tablet, Take 1 tablet (80 mg total) by mouth every 12 (twelve) hours, Disp: 180 tablet, Rfl: 3    vitamin B-12 (VITAMIN B-12) 1,000 mcg tablet, Take by mouth daily, Disp: , Rfl:     levothyroxine 75 mcg tablet, TAKE 1 TABLET (75 MCG TOTAL) BY MOUTH DAILY IN THE EARLY MORNING, Disp: 90 tablet, Rfl: 1    magnesium oxide (MAG-OX) 400 mg, Take 400 mg by mouth daily, Disp: , Rfl:     melatonin 3 mg, Take by mouth daily at bedtime, Disp: , Rfl:     Historical Information   Past Medical History:   Diagnosis Date    Dyslipidemia     Erectile dysfunction of non-organic origin     Gastroesophageal reflux     Hyperlipidemia     Hypothyroidism     Liver disease     Malignant neoplasm of kidney (HCC)     Left    Malignant neoplasm of left kidney, except renal pelvis (HCC)     Pure hypercholesterolemia     Thyroid disease     Vitamin D deficiency, unspecified      Past Surgical History:   Procedure Laterality Date    CARDIAC PACEMAKER PLACEMENT      CATARACT EXTRACTION Bilateral 2014    CYSTOSCOPY      EGD AND COLONOSCOPY  12/15/2009    HERNIA REPAIR Left     Inguinal    URETHRA SURGERY  05/2010    Done by Dr. Mohan      Social History   Social History     Tobacco Use   Smoking Status Former   Smokeless Tobacco Never  "  Tobacco Comments    quit at age 20       Family History:   Family History   Problem Relation Age of Onset    Cancer Mother         bladder    Heart disease Mother     Prostate cancer Father     Stroke Father     Heart disease Father     Hyperlipidemia Family          PhysicalExamination:  Vitals:   /58 (BP Location: Right arm, Patient Position: Sitting, Cuff Size: Standard)   Pulse 60   Ht 5' 9\" (1.753 m)   Wt 64 kg (141 lb)   SpO2 94%   BMI 20.82 kg/m²     Appearance -- NAD, speaking full sentences  HEENT -- anicteric sclera, clear OP, MMM  Neck -- no JVD  Heart -- RRR, no murmurs  Lungs -- clear lungs  Abdomen -- soft, NTND, +bs  Extremities -- WWP, no LE edema  Skin -- no rash  Neuro -- A&Ox3, wnl  Psych -- no obvious depression or hallucination        Diagnostic Data:  Labs:  I personally reviewed the most recent laboratory data pertinent to today's visit    Lab Results   Component Value Date    WBC 5.74 06/09/2023    HGB 12.8 06/09/2023    HCT 38.6 06/09/2023     (H) 06/09/2023     06/09/2023     Lab Results   Component Value Date    CALCIUM 9.6 06/09/2023    K 3.9 06/09/2023    CO2 32 06/09/2023     06/09/2023    BUN 18 06/09/2023    CREATININE 0.96 06/09/2023     No results found for: \"IGE\"  Lab Results   Component Value Date    ALT 44 12/12/2022    AST 28 12/12/2022    ALKPHOS 102 12/12/2022       PFT results:  The most recent pulmonary function tests were reviewed.  NONE    Imaging:  I personally reviewed the images on the PAC system pertinent to today's visit  CT Chest 8/2023:  Extensive bilateral tree-in-bud nodularity and confluent nodules involving all lobes of both lungs with consolidation in the right middle lobe and inferior lingula, present since June 2023, new since June 2021. This could be atypical mycobacterial infection. Recommend pulmonary referral.  Also with patulous esophagus          Emmanuelle Mathews MD  [facility]  "

## 2024-01-30 ENCOUNTER — REMOTE DEVICE CLINIC VISIT (OUTPATIENT)
Dept: CARDIOLOGY CLINIC | Facility: CLINIC | Age: 79
End: 2024-01-30
Payer: MEDICARE

## 2024-01-30 DIAGNOSIS — Z95.0 CARDIAC PACEMAKER IN SITU: Primary | ICD-10-CM

## 2024-01-30 PROCEDURE — 93296 REM INTERROG EVL PM/IDS: CPT | Performed by: STUDENT IN AN ORGANIZED HEALTH CARE EDUCATION/TRAINING PROGRAM

## 2024-01-30 PROCEDURE — 93294 REM INTERROG EVL PM/LDLS PM: CPT | Performed by: STUDENT IN AN ORGANIZED HEALTH CARE EDUCATION/TRAINING PROGRAM

## 2024-01-30 NOTE — PROGRESS NOTES
"Results for orders placed or performed in visit on 01/30/24   Cardiac EP device report    Narrative    MDT DUAL PM/ ACTIVE SYSTEM IS MRI CONDITIONAL  CARELINK TRANSMISSION: BATTERY STATUS \"9 YRS.\" AP 70%  98%. ALL AVAILABLE LEAD PARAMETERS WITHIN NORMAL LIMITS. 1 NSVT NOTED; APPROX 6 BEATS@ 240 BPM. PT ON SOTALOL & EF 55% (ECHO 2021). NORMAL DEVICE FUNCTION. NC         "

## 2024-02-08 ENCOUNTER — RA CDI HCC (OUTPATIENT)
Dept: OTHER | Facility: HOSPITAL | Age: 79
End: 2024-02-08

## 2024-02-08 PROBLEM — I11.0 HYPERTENSIVE HEART DISEASE WITH CONGESTIVE HEART FAILURE (HCC): Status: ACTIVE | Noted: 2024-02-08

## 2024-02-08 PROBLEM — I11.0 HYPERTENSIVE HEART DISEASE WITH CONGESTIVE HEART FAILURE (HCC): Status: ACTIVE | Noted: 2023-10-18

## 2024-02-14 ENCOUNTER — OFFICE VISIT (OUTPATIENT)
Dept: FAMILY MEDICINE CLINIC | Facility: CLINIC | Age: 79
End: 2024-02-14
Payer: MEDICARE

## 2024-02-14 VITALS
RESPIRATION RATE: 16 BRPM | HEIGHT: 69 IN | WEIGHT: 145 LBS | BODY MASS INDEX: 21.48 KG/M2 | DIASTOLIC BLOOD PRESSURE: 76 MMHG | HEART RATE: 80 BPM | SYSTOLIC BLOOD PRESSURE: 118 MMHG | OXYGEN SATURATION: 98 %

## 2024-02-14 DIAGNOSIS — C64.2 MALIGNANT NEOPLASM OF LEFT KIDNEY, EXCEPT RENAL PELVIS (HCC): ICD-10-CM

## 2024-02-14 DIAGNOSIS — I48.19 PERSISTENT ATRIAL FIBRILLATION (HCC): ICD-10-CM

## 2024-02-14 DIAGNOSIS — E78.2 MIXED HYPERLIPIDEMIA: Primary | ICD-10-CM

## 2024-02-14 DIAGNOSIS — Z95.0 PACEMAKER: ICD-10-CM

## 2024-02-14 DIAGNOSIS — I50.32 CHRONIC DIASTOLIC (CONGESTIVE) HEART FAILURE (HCC): ICD-10-CM

## 2024-02-14 DIAGNOSIS — E53.8 B12 DEFICIENCY: ICD-10-CM

## 2024-02-14 DIAGNOSIS — E03.9 ACQUIRED HYPOTHYROIDISM: ICD-10-CM

## 2024-02-14 DIAGNOSIS — Z78.9 STATIN INTOLERANCE: ICD-10-CM

## 2024-02-14 PROCEDURE — 99214 OFFICE O/P EST MOD 30 MIN: CPT | Performed by: FAMILY MEDICINE

## 2024-02-14 NOTE — PROGRESS NOTES
Name: Simba Dawn Sr.      : 1945      MRN: [id number]  Encounter Provider: Dontae Terrazas MD  Encounter Date: 2024   Encounter department: [facility]    Assessment & Plan     Assessment & Plan  1. Multifocal pneumonia.  His cough and shortness of breath are getting better. He can use the nebulizer 1 time in the afternoon as needed.    2. Weight management.  His weight has increased from 135 pounds to 145 pounds. He is gaining weight.    3. Kidney cancer.  There is no recurrence on the CT scan.    Follow-up  The patient will follow up in 6 months.     No orders of the defined types were placed in this encounter.  1. Mixed hyperlipidemia  -     Lipid Panel with Direct LDL reflex; Future; Expected date: 06/10/2024    2. B12 deficiency  -     Vitamin B12; Future; Expected date: 06/10/2024    3. Persistent atrial fibrillation (HCC)  -     TSH, 3rd generation with Free T4 reflex; Future; Expected date: 06/10/2024  -     UA w Reflex to Microscopic w Reflex to Culture; Future; Expected date: 06/10/2024  -     Lipid Panel with Direct LDL reflex; Future; Expected date: 06/10/2024  -     CBC and differential; Future; Expected date: 06/10/2024  -     Comprehensive metabolic panel; Future; Expected date: 06/10/2024  -     Magnesium; Future; Expected date: 06/10/2024  -     Vitamin B12; Future; Expected date: 06/10/2024    4. Acquired hypothyroidism  -     TSH, 3rd generation with Free T4 reflex; Future; Expected date: 06/10/2024  -     UA w Reflex to Microscopic w Reflex to Culture; Future; Expected date: 06/10/2024  -     Lipid Panel with Direct LDL reflex; Future; Expected date: 06/10/2024  -     CBC and differential; Future; Expected date: 06/10/2024  -     Comprehensive metabolic panel; Future; Expected date: 06/10/2024  -     Magnesium; Future; Expected date: 06/10/2024  -     Vitamin B12; Future; Expected date: 06/10/2024    5. Chronic diastolic (congestive) heart failure (HCC)  -      TSH, 3rd generation with Free T4 reflex; Future; Expected date: 06/10/2024  -     UA w Reflex to Microscopic w Reflex to Culture; Future; Expected date: 06/10/2024  -     Lipid Panel with Direct LDL reflex; Future; Expected date: 06/10/2024  -     CBC and differential; Future; Expected date: 06/10/2024  -     Comprehensive metabolic panel; Future; Expected date: 06/10/2024  -     Magnesium; Future; Expected date: 06/10/2024  -     Vitamin B12; Future; Expected date: 06/10/2024    6. Statin intolerance    7. Pacemaker    8. Malignant neoplasm of left kidney, except renal pelvis (HCC)  Assessment & Plan:  cT neg             Subjective      History of Present Illness  The patient is an 79-year-old male who presents for a 6-month follow-up.    His right lung is messed up. He went through a fit yesterday after shoveling snow and working outside, which stirred up his lung. He started coughing and spitting stuff up. He used the nebulizer a couple of times yesterday. He uses it in the morning and after supper at night. His appetite has been good. He started to gain weight after he was on the nebulizer. He is not taking pravastatin. He is not interested in injections. He denies any problems with his bowel movements.   He is intolerant to STATINS.   He saw Dr. Newman for atrial fibrillation and CHF. He has not seen his nephrologist for a while.  Review of Systems   Constitutional:  Negative for chills and fever.   HENT:  Negative for ear pain and sore throat.    Eyes:  Negative for pain and visual disturbance.   Respiratory:  Negative for cough and shortness of breath.    Cardiovascular:  Negative for chest pain and palpitations.   Gastrointestinal:  Negative for abdominal pain and vomiting.   Genitourinary:  Negative for dysuria and hematuria.   Musculoskeletal:  Negative for arthralgias and back pain.   Skin:  Negative for color change and rash.   Neurological:  Negative for seizures and syncope.   All other systems  "reviewed and are negative.        Objective     /76 (BP Location: Left arm, Patient Position: Sitting, Cuff Size: Standard)   Pulse 80   Resp 16   Ht 5' 9\" (1.753 m)   Wt 65.8 kg (145 lb)   SpO2 98%   BMI 21.41 kg/m²     Physical Exam  Vital Signs  Weight is 145.  Physical Exam  Vitals and nursing note reviewed.   Constitutional:       General: He is not in acute distress.     Appearance: He is well-developed.   HENT:      Head: Normocephalic and atraumatic.   Cardiovascular:      Rate and Rhythm: Normal rate and regular rhythm.      Heart sounds: Normal heart sounds. No murmur heard.  Pulmonary:      Effort: Pulmonary effort is normal.      Breath sounds: Normal breath sounds. No wheezing or rales.   Abdominal:      General: Bowel sounds are normal. There is no distension.      Palpations: Abdomen is soft.      Tenderness: There is no abdominal tenderness. There is no guarding or rebound.   Musculoskeletal:         General: No tenderness. Normal range of motion.      Cervical back: Normal range of motion and neck supple.   Lymphadenopathy:      Cervical: No cervical adenopathy.   Skin:     General: Skin is warm and dry.      Capillary Refill: Capillary refill takes less than 2 seconds.      Findings: No rash.   Neurological:      Mental Status: He is alert and oriented to person, place, and time.      Cranial Nerves: No cranial nerve deficit.      Sensory: No sensory deficit.      Motor: No abnormal muscle tone.   Psychiatric:         Behavior: Behavior normal.         Thought Content: Thought content normal.         Judgment: Judgment normal.           "

## 2024-02-22 ENCOUNTER — TELEPHONE (OUTPATIENT)
Dept: PULMONOLOGY | Facility: CLINIC | Age: 79
End: 2024-02-22

## 2024-02-22 DIAGNOSIS — J18.9 MULTIFOCAL PNEUMONIA: ICD-10-CM

## 2024-02-22 DIAGNOSIS — J21.9 BRONCHIOLITIS: Primary | ICD-10-CM

## 2024-02-22 DIAGNOSIS — R05.3 PERSISTENT COUGH: ICD-10-CM

## 2024-02-22 NOTE — TELEPHONE ENCOUNTER
Pt came into office to request new prescription for nebulizer supplies. Please send to [email or web address] and will need dx. Thank you

## 2024-02-28 LAB
DME PARACHUTE DELIVERY DATE ACTUAL: NORMAL
DME PARACHUTE DELIVERY DATE REQUESTED: NORMAL
DME PARACHUTE ITEM DESCRIPTION: NORMAL
DME PARACHUTE ORDER STATUS: NORMAL
DME PARACHUTE SUPPLIER NAME: NORMAL
DME PARACHUTE SUPPLIER PHONE: NORMAL

## 2024-03-27 DIAGNOSIS — E03.9 ACQUIRED HYPOTHYROIDISM: ICD-10-CM

## 2024-03-27 RX ORDER — LEVOTHYROXINE SODIUM 0.07 MG/1
75 TABLET ORAL
Qty: 90 TABLET | Refills: 1 | Status: SHIPPED | OUTPATIENT
Start: 2024-03-27

## 2024-03-27 NOTE — TELEPHONE ENCOUNTER
Reason for call:   [x] Refill   [] Prior Auth  [] Other:     Office:   [x] PCP/Provider -   [] Specialty/Provider -     Medication: levothyroxine 75 mcg tablet     Dose/Frequency: TAKE 1 TABLET (75 MCG TOTAL) BY MOUTH DAILY IN THE EARLY MORNING     Quantity: 90    Pharmacy: [facility] #[id number]  [address] - [id number] [phone]    Does the patient have enough for 3 days?   [x] Yes   [] No - Send as HP to POD

## 2024-04-22 PROBLEM — J21.9 BRONCHIOLITIS: Status: RESOLVED | Noted: 2024-02-22 | Resolved: 2024-04-22

## 2024-04-22 PROBLEM — J18.9 MULTIFOCAL PNEUMONIA: Status: RESOLVED | Noted: 2024-02-22 | Resolved: 2024-04-22

## 2024-04-30 ENCOUNTER — REMOTE DEVICE CLINIC VISIT (OUTPATIENT)
Dept: CARDIOLOGY CLINIC | Facility: CLINIC | Age: 79
End: 2024-04-30
Payer: MEDICARE

## 2024-04-30 DIAGNOSIS — Z95.0 PRESENCE OF PERMANENT CARDIAC PACEMAKER: Primary | ICD-10-CM

## 2024-04-30 PROCEDURE — 93296 REM INTERROG EVL PM/IDS: CPT | Performed by: INTERNAL MEDICINE

## 2024-04-30 PROCEDURE — 93294 REM INTERROG EVL PM/LDLS PM: CPT | Performed by: INTERNAL MEDICINE

## 2024-04-30 NOTE — PROGRESS NOTES
Results for orders placed or performed in visit on 04/30/24   Cardiac EP device report    Narrative    MDT DUAL PM/ ACTIVE SYSTEM IS MRI CONDITIONAL  CARELINK TRANSMISSION: BATTERY VOLTAGE ADEQUATE. (9 YRS) AP 78%  99%. ALL AVAILABLE LEAD PARAMETERS WITHIN NORMAL LIMITS. NO SIGNIFICANT HIGH RATE EPISODES. NORMAL DEVICE FUNCTION.---LYNCH

## 2024-06-07 ENCOUNTER — HOSPITAL ENCOUNTER (OUTPATIENT)
Dept: CT IMAGING | Facility: HOSPITAL | Age: 79
Discharge: HOME | End: 2024-06-07
Attending: INTERNAL MEDICINE
Payer: MEDICARE

## 2024-06-07 DIAGNOSIS — J18.9 MULTIFOCAL PNEUMONIA: ICD-10-CM

## 2024-06-07 DIAGNOSIS — R05.3 PERSISTENT COUGH: ICD-10-CM

## 2024-06-07 PROCEDURE — G1004 CDSM NDSC: HCPCS

## 2024-06-07 PROCEDURE — 71250 CT THORAX DX C-: CPT

## 2024-06-10 ENCOUNTER — APPOINTMENT (OUTPATIENT)
Dept: LAB | Facility: MEDICAL CENTER | Age: 79
End: 2024-06-10
Payer: MEDICARE

## 2024-06-10 DIAGNOSIS — E53.8 B12 DEFICIENCY: ICD-10-CM

## 2024-06-10 DIAGNOSIS — E78.2 MIXED HYPERLIPIDEMIA: ICD-10-CM

## 2024-06-10 DIAGNOSIS — I48.19 PERSISTENT ATRIAL FIBRILLATION (HCC): ICD-10-CM

## 2024-06-10 DIAGNOSIS — E03.9 ACQUIRED HYPOTHYROIDISM: ICD-10-CM

## 2024-06-10 DIAGNOSIS — I50.32 CHRONIC DIASTOLIC (CONGESTIVE) HEART FAILURE (HCC): ICD-10-CM

## 2024-06-10 LAB
ALBUMIN SERPL BCP-MCNC: 4.2 G/DL (ref 3.5–5)
ALP SERPL-CCNC: 70 U/L (ref 34–104)
ALT SERPL W P-5'-P-CCNC: 17 U/L (ref 7–52)
ANION GAP SERPL CALCULATED.3IONS-SCNC: 6 MMOL/L (ref 4–13)
AST SERPL W P-5'-P-CCNC: 23 U/L (ref 13–39)
BACTERIA UR QL AUTO: NORMAL /HPF
BASOPHILS # BLD AUTO: 0.02 THOUSANDS/ÂΜL (ref 0–0.1)
BASOPHILS NFR BLD AUTO: 0 % (ref 0–1)
BILIRUB SERPL-MCNC: 0.63 MG/DL (ref 0.2–1)
BILIRUB UR QL STRIP: NEGATIVE
BUN SERPL-MCNC: 24 MG/DL (ref 5–25)
CALCIUM SERPL-MCNC: 9.6 MG/DL (ref 8.4–10.2)
CHLORIDE SERPL-SCNC: 101 MMOL/L (ref 96–108)
CHOLEST SERPL-MCNC: 248 MG/DL
CLARITY UR: CLEAR
CO2 SERPL-SCNC: 31 MMOL/L (ref 21–32)
COLOR UR: ABNORMAL
CREAT SERPL-MCNC: 0.84 MG/DL (ref 0.6–1.3)
EOSINOPHIL # BLD AUTO: 0.1 THOUSAND/ÂΜL (ref 0–0.61)
EOSINOPHIL NFR BLD AUTO: 2 % (ref 0–6)
ERYTHROCYTE [DISTWIDTH] IN BLOOD BY AUTOMATED COUNT: 13 % (ref 11.6–15.1)
GFR SERPL CREATININE-BSD FRML MDRD: 82 ML/MIN/1.73SQ M
GLUCOSE P FAST SERPL-MCNC: 83 MG/DL (ref 65–99)
GLUCOSE UR STRIP-MCNC: NEGATIVE MG/DL
HCT VFR BLD AUTO: 40.2 % (ref 36.5–49.3)
HDLC SERPL-MCNC: 50 MG/DL
HGB BLD-MCNC: 13 G/DL (ref 12–17)
HGB UR QL STRIP.AUTO: NEGATIVE
IMM GRANULOCYTES # BLD AUTO: 0.01 THOUSAND/UL (ref 0–0.2)
IMM GRANULOCYTES NFR BLD AUTO: 0 % (ref 0–2)
KETONES UR STRIP-MCNC: NEGATIVE MG/DL
LDLC SERPL CALC-MCNC: 181 MG/DL (ref 0–100)
LEUKOCYTE ESTERASE UR QL STRIP: NEGATIVE
LYMPHOCYTES # BLD AUTO: 1.29 THOUSANDS/ÂΜL (ref 0.6–4.47)
LYMPHOCYTES NFR BLD AUTO: 26 % (ref 14–44)
MAGNESIUM SERPL-MCNC: 2.1 MG/DL (ref 1.9–2.7)
MCH RBC QN AUTO: 34 PG (ref 26.8–34.3)
MCHC RBC AUTO-ENTMCNC: 32.3 G/DL (ref 31.4–37.4)
MCV RBC AUTO: 105 FL (ref 82–98)
MONOCYTES # BLD AUTO: 0.64 THOUSAND/ÂΜL (ref 0.17–1.22)
MONOCYTES NFR BLD AUTO: 13 % (ref 4–12)
NEUTROPHILS # BLD AUTO: 2.84 THOUSANDS/ÂΜL (ref 1.85–7.62)
NEUTS SEG NFR BLD AUTO: 59 % (ref 43–75)
NITRITE UR QL STRIP: NEGATIVE
NON-SQ EPI CELLS URNS QL MICRO: NORMAL /HPF
NRBC BLD AUTO-RTO: 0 /100 WBCS
PH UR STRIP.AUTO: 6.5 [PH]
PLATELET # BLD AUTO: 238 THOUSANDS/UL (ref 149–390)
PMV BLD AUTO: 9.6 FL (ref 8.9–12.7)
POTASSIUM SERPL-SCNC: 4.5 MMOL/L (ref 3.5–5.3)
PROT SERPL-MCNC: 7.5 G/DL (ref 6.4–8.4)
PROT UR STRIP-MCNC: ABNORMAL MG/DL
RBC # BLD AUTO: 3.82 MILLION/UL (ref 3.88–5.62)
RBC #/AREA URNS AUTO: NORMAL /HPF
SODIUM SERPL-SCNC: 138 MMOL/L (ref 135–147)
SP GR UR STRIP.AUTO: 1.02 (ref 1–1.03)
TRIGL SERPL-MCNC: 87 MG/DL
TSH SERPL DL<=0.05 MIU/L-ACNC: 0.66 UIU/ML (ref 0.45–4.5)
UROBILINOGEN UR STRIP-ACNC: <2 MG/DL
VIT B12 SERPL-MCNC: 1313 PG/ML (ref 180–914)
WBC # BLD AUTO: 4.9 THOUSAND/UL (ref 4.31–10.16)
WBC #/AREA URNS AUTO: NORMAL /HPF

## 2024-06-10 PROCEDURE — 80053 COMPREHEN METABOLIC PANEL: CPT

## 2024-06-10 PROCEDURE — 85025 COMPLETE CBC W/AUTO DIFF WBC: CPT

## 2024-06-10 PROCEDURE — 82607 VITAMIN B-12: CPT

## 2024-06-10 PROCEDURE — 84443 ASSAY THYROID STIM HORMONE: CPT

## 2024-06-10 PROCEDURE — 81001 URINALYSIS AUTO W/SCOPE: CPT

## 2024-06-10 PROCEDURE — 83735 ASSAY OF MAGNESIUM: CPT

## 2024-06-10 PROCEDURE — 36415 COLL VENOUS BLD VENIPUNCTURE: CPT

## 2024-06-10 PROCEDURE — 80061 LIPID PANEL: CPT

## 2024-06-28 ENCOUNTER — OFFICE VISIT (OUTPATIENT)
Dept: PULMONOLOGY | Facility: CLINIC | Age: 79
End: 2024-06-28
Payer: MEDICARE

## 2024-06-28 VITALS
HEART RATE: 70 BPM | BODY MASS INDEX: 20.76 KG/M2 | OXYGEN SATURATION: 97 % | SYSTOLIC BLOOD PRESSURE: 102 MMHG | DIASTOLIC BLOOD PRESSURE: 56 MMHG | WEIGHT: 140.6 LBS | TEMPERATURE: 97.7 F

## 2024-06-28 DIAGNOSIS — J21.9 BRONCHIOLITIS: Primary | ICD-10-CM

## 2024-06-28 DIAGNOSIS — J18.9 MULTIFOCAL PNEUMONIA: ICD-10-CM

## 2024-06-28 DIAGNOSIS — R05.3 PERSISTENT COUGH: ICD-10-CM

## 2024-06-28 PROCEDURE — G2211 COMPLEX E/M VISIT ADD ON: HCPCS | Performed by: INTERNAL MEDICINE

## 2024-06-28 PROCEDURE — 99214 OFFICE O/P EST MOD 30 MIN: CPT | Performed by: INTERNAL MEDICINE

## 2024-06-28 RX ORDER — SODIUM CHLORIDE FOR INHALATION 3 %
4 VIAL, NEBULIZER (ML) INHALATION 2 TIMES DAILY
Qty: 720 ML | Refills: 1 | Status: SHIPPED | OUTPATIENT
Start: 2024-06-28 | End: 2024-12-25

## 2024-06-28 RX ORDER — ALBUTEROL SULFATE 2.5 MG/3ML
2.5 SOLUTION RESPIRATORY (INHALATION) 2 TIMES DAILY
Qty: 540 ML | Refills: 1 | Status: SHIPPED | OUTPATIENT
Start: 2024-06-28 | End: 2024-12-25

## 2024-06-28 NOTE — PROGRESS NOTES
"    Follow Up - Pulmonary Medicine   Simba Dawn Sr. 79 y.o. male MRN: [id number]    Physician Requesting Consult: Dr Terrazas  Reason for Consult: cough, abnormal CT    Simba Dawn Sr. is a 79 y.o. male who presents for evaluation of chronic cough and abnormal CT.    Bronchiolitis on CT  Chronic Cough  Wheezing  Pt with cough for 8+ months. CT Chest with extensive tree in bud, scattered nodules and consolidation in RML and lingula. Dx includes MAC vs chronic aspiration. Pt has GERD and patulous esophagus with many years of dysphagia to solid food and some liquids. Undergoing GI workup, overall had \"risk of aspiration\" on barium swallow but with small, slow sips and small bites, no overt aspiration.   Sputum culture with normal respiratory mahi and Afb also negative in the past. With mucous clearance therapy, cough and dyspnea significantly improved, and lugns sound clear on exam. CT Chest with waxing and waning reticulonodular infiltrates, persistent tree in bud, mucoid impaction.   Eos low    - discussed risks and benefits of bronchi and BAL to send for micro, because pt feels cough and Sob improving and no infectious symptoms, reasonable to hold off  - repeat sputum now (and at least yearly) for bacterial cultures and AFB  - airway clearance regimen: albuterol followed by 3% saline nebulizer BID  --- reviewed nebulizer technique  --- has nebulizer   --- reordered albuterol+ saline  --- flutter valve given  - continue GI follow up and aspiration precautions     f/u 6 month     Weight loss  Poor appetite, weight loss saw Nutrition for malnutrition/weight loss.   - Has been eating better and weight stable    Vaccines  - COVID unknown  - Flu UTD  - PNA UTD    Immunization History   Administered Date(s) Administered    INFLUENZA 09/22/2020, 10/04/2021, 12/06/2022, 09/07/2023    Influenza, Seasonal Vaccine, Quadrivalent, Adjuvanted, .5e 09/07/2023    Influenza, high dose seasonal 0.7 mL 12/06/2022    " Pneumococcal 03/23/2009    Pneumococcal Conjugate 13-Valent 10/07/2015    Pneumococcal Conjugate PCV 7 03/23/2009    Pneumococcal Polysaccharide PPV23 12/01/2021    influenza, trivalent, adjuvanted 09/22/2020      I have spent a total time of 30 minutes on 06/28/24 in caring for this patient including Diagnostic results, Prognosis, Risks and benefits of tx options, Instructions for management, Patient and family education, Importance of tx compliance, Risk factor reductions, Impressions, Counseling / Coordination of care, Documenting in the medical record, Reviewing / ordering tests, medicine, procedures  , and Obtaining or reviewing history  .   ______________________________    Interval Hx:  No infectious sx or antibiotics  Has good days and bad days in terms of cough. Doing mucous clearance twice a day, sometimes little mucous sometimes more  No fevers, hemoptysis, wheezing      HPI:    Simba Dawn  is a 79 y.o. male who presents for evaluaiton of chornic cough and abnormal CT. PMH includes GERD, Afib, CHF, RCC (left) sp cryoablation in 2020 with no recurrence.    Pt reports chronic cough for 8 months. Got 10day course abx (ceftin and azithromycin) x2 which improved symptoms initially but then cough came back.  Bring up mucous and phlegm, yellowish gray  Does report some dysphagia for many years, worse with pills and solid food than liquid. Also with significant GERD, PPI didn't help so he stopped using  No fevers, chills. Did have night sweats a few times in January but this resolved.   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    Current Medications:    Current Outpatient Medications:     Ascorbic Acid (vitamin C) 1000 MG tablet, Take 1,000 mg by mouth daily, Disp: , Rfl:     cholecalciferol (VITAMIN D3) 1,000 units tablet, Take 1,000 Units by mouth daily, Disp: , Rfl:     Coenzyme Q10 (CoQ10) 100 MG CAPS, Take 2 capsules (200 mg total) by mouth daily, Disp: 60 capsule, Rfl: 0    levothyroxine 75 mcg tablet, Take 1 tablet (75 mcg total) by mouth daily in the early morning, Disp: 90 tablet, Rfl: 1    magnesium oxide (MAG-OX) 400 mg, Take 400 mg by mouth daily, Disp: , Rfl:     melatonin 3 mg, Take by mouth daily at bedtime, Disp: , Rfl:     Probiotic Product (CULTURELLE PROBIOTICS PO), Take by mouth, Disp: , Rfl:     rivaroxaban (XARELTO) 20 mg tablet, Take 1 tablet (20 mg total) by mouth daily after dinner, Disp: 90 tablet, Rfl: 3    sodium chloride 3 % inhalation solution, Take 4 mL by nebulization 3 (three) times a day, Disp: 240 mL, Rfl: 3    sotalol (BETAPACE) 80 mg tablet, Take 1 tablet (80 mg total) by mouth every 12 (twelve) hours, Disp: 180 tablet, Rfl: 3    vitamin B-12 (VITAMIN B-12) 1,000 mcg tablet, Take by mouth daily, Disp: , Rfl:     Historical Information   Past Medical History:   Diagnosis Date    Dyslipidemia     Erectile dysfunction of non-organic origin     Gastroesophageal reflux     Hyperlipidemia     Hypothyroidism     Liver disease     Malignant neoplasm of kidney (HCC)     Left    Malignant neoplasm of left kidney, except renal pelvis (HCC)     Pure hypercholesterolemia     Thyroid disease     Vitamin D deficiency, unspecified      Past Surgical History:   Procedure Laterality Date    CARDIAC PACEMAKER PLACEMENT      CATARACT EXTRACTION Bilateral 2014    CYSTOSCOPY      EGD AND COLONOSCOPY  12/15/2009    HERNIA REPAIR Left     Inguinal    URETHRA SURGERY  05/2010    Done by Dr. Mohan      Social History   Social History     Tobacco Use   Smoking Status Former   Smokeless Tobacco Never  "  Tobacco Comments    quit at age 20       Family History:   Family History   Problem Relation Age of Onset    Cancer Mother         bladder    Heart disease Mother     Prostate cancer Father     Stroke Father     Heart disease Father     Hyperlipidemia Family          PhysicalExamination:  Vitals:   /56   Pulse 70   Temp 97.7 °F (36.5 °C)   Wt 63.8 kg (140 lb 9.6 oz)   SpO2 97%   BMI 20.76 kg/m²     Appearance -- thin elderly man, NAD  HEENT -- anicteric sclera, clear OP, MMM  Neck -- no JVD  Heart -- RRR, no murmurs  Lungs -- clear lungs  Abdomen -- soft, NTND, +bs  Extremities -- WWP, no LE edema  Skin -- no rash  Neuro -- A&Ox3, wnl  Psych -- no obvious depression or hallucination        Diagnostic Data:  Labs:  I personally reviewed the most recent laboratory data pertinent to today's visit    Lab Results   Component Value Date    WBC 4.90 06/10/2024    HGB 13.0 06/10/2024    HCT 40.2 06/10/2024     (H) 06/10/2024     06/10/2024     Lab Results   Component Value Date    CALCIUM 9.6 06/10/2024    K 4.5 06/10/2024    CO2 31 06/10/2024     06/10/2024    BUN 24 06/10/2024    CREATININE 0.84 06/10/2024     No results found for: \"IGE\"  Lab Results   Component Value Date    ALT 17 06/10/2024    AST 23 06/10/2024    ALKPHOS 70 06/10/2024       PFT results:  The most recent pulmonary function tests were reviewed.  NONE    Imaging:  I personally reviewed the images on the PAC system pertinent to today's visit  CT Chest 8/2023:  Extensive bilateral tree-in-bud nodularity and confluent nodules involving all lobes of both lungs with consolidation in the right middle lobe and inferior lingula, present since June 2023, new since June 2021. This could be atypical mycobacterial infection. Recommend pulmonary referral.  Also with patulous esophagus    CT Chest 6.2024:   Previous right apical paramediastinal consolidation has improved. A mild lingular basilar paramediastinal consolidation has " worsened.  2.  Otherwise, findings of tree-in-bud nodularity, mucoid impaction, and multifocal consolidation persist, again favoring atypical mycobacterial infection, with chronic aspiration considered less likely.        Emmanuelle Mathews MD  [facility]

## 2024-07-15 ENCOUNTER — TELEPHONE (OUTPATIENT)
Dept: PULMONOLOGY | Facility: CLINIC | Age: 79
End: 2024-07-15

## 2024-07-17 ENCOUNTER — TELEPHONE (OUTPATIENT)
Dept: PULMONOLOGY | Facility: CLINIC | Age: 79
End: 2024-07-17

## 2024-07-18 LAB
DME PARACHUTE DELIVERY DATE REQUESTED: NORMAL
DME PARACHUTE ITEM DESCRIPTION: NORMAL
DME PARACHUTE ITEM DESCRIPTION: NORMAL
DME PARACHUTE ORDER STATUS: NORMAL
DME PARACHUTE SUPPLIER NAME: NORMAL
DME PARACHUTE SUPPLIER PHONE: NORMAL

## 2024-07-19 ENCOUNTER — APPOINTMENT (OUTPATIENT)
Dept: LAB | Facility: MEDICAL CENTER | Age: 79
End: 2024-07-19
Payer: MEDICARE

## 2024-07-19 DIAGNOSIS — J18.9 MULTIFOCAL PNEUMONIA: ICD-10-CM

## 2024-07-19 DIAGNOSIS — R05.3 PERSISTENT COUGH: ICD-10-CM

## 2024-07-19 DIAGNOSIS — J21.9 BRONCHIOLITIS: ICD-10-CM

## 2024-07-19 PROCEDURE — 87206 SMEAR FLUORESCENT/ACID STAI: CPT

## 2024-07-19 PROCEDURE — 87116 MYCOBACTERIA CULTURE: CPT

## 2024-07-19 PROCEDURE — 87205 SMEAR GRAM STAIN: CPT

## 2024-07-19 PROCEDURE — 87070 CULTURE OTHR SPECIMN AEROBIC: CPT

## 2024-07-19 NOTE — TELEPHONE ENCOUNTER
Called the [facility] few times to let him know that I ordered Albuterol via parachute. [facility] had made multiple attempts to contact this pt with no return call. They  have set a follow up for 1 week to try to reach the pt. I L V/M to ask him to reach out to the New Patient Department at: [phone]. Left our office phone number if he has any questions.

## 2024-07-20 LAB — RHODAMINE-AURAMINE STN SPEC: NORMAL

## 2024-07-21 LAB
BACTERIA SPT RESP CULT: ABNORMAL
GRAM STN SPEC: ABNORMAL

## 2024-07-23 LAB
MYCOBACTERIUM SPEC CULT: NORMAL
RHODAMINE-AURAMINE STN SPEC: NORMAL

## 2024-07-30 ENCOUNTER — REMOTE DEVICE CLINIC VISIT (OUTPATIENT)
Dept: CARDIOLOGY CLINIC | Facility: CLINIC | Age: 79
End: 2024-07-30
Payer: MEDICARE

## 2024-07-30 DIAGNOSIS — Z95.0 PRESENCE OF PERMANENT CARDIAC PACEMAKER: Primary | ICD-10-CM

## 2024-07-30 LAB
MYCOBACTERIUM SPEC CULT: NORMAL
RHODAMINE-AURAMINE STN SPEC: NORMAL

## 2024-07-30 PROCEDURE — 93296 REM INTERROG EVL PM/IDS: CPT | Performed by: INTERNAL MEDICINE

## 2024-07-30 PROCEDURE — 93294 REM INTERROG EVL PM/LDLS PM: CPT | Performed by: INTERNAL MEDICINE

## 2024-07-30 NOTE — PROGRESS NOTES
Results for orders placed or performed in visit on 07/30/24   Cardiac EP device report    Narrative    MDT DUAL PM/ ACTIVE SYSTEM IS MRI CONDITIONAL  CARELINK TRANSMISSION: BATTERY VOLTAGE ADEQUATE (8.8 YRS). AP 79.9%  98.8% (>40%/DDDR 60) ALL AVAILABLE LEAD PARAMETERS WITHIN NORMAL LIMITS. 3 VT-NS EPISODES W/ EGMS SHOWING NSVT (11 BEATS  BPM, 12 BEATS  BPM, 10 BEATS  BPM). TAKES XARELTO, SOTALOL. EF 55% (ECHO 3/24/21). NORMAL DEVICE FUNCTION. AM

## 2024-08-06 LAB
MYCOBACTERIUM SPEC CULT: NORMAL
RHODAMINE-AURAMINE STN SPEC: NORMAL

## 2024-08-08 ENCOUNTER — RA CDI HCC (OUTPATIENT)
Dept: OTHER | Facility: HOSPITAL | Age: 79
End: 2024-08-08

## 2024-08-13 LAB
MYCOBACTERIUM SPEC CULT: NORMAL
RHODAMINE-AURAMINE STN SPEC: NORMAL

## 2024-08-14 ENCOUNTER — OFFICE VISIT (OUTPATIENT)
Dept: FAMILY MEDICINE CLINIC | Facility: CLINIC | Age: 79
End: 2024-08-14
Payer: MEDICARE

## 2024-08-14 VITALS
HEIGHT: 69 IN | SYSTOLIC BLOOD PRESSURE: 124 MMHG | BODY MASS INDEX: 20.88 KG/M2 | HEART RATE: 78 BPM | DIASTOLIC BLOOD PRESSURE: 78 MMHG | RESPIRATION RATE: 16 BRPM | OXYGEN SATURATION: 98 % | WEIGHT: 141 LBS

## 2024-08-14 DIAGNOSIS — Z78.9 STATIN INTOLERANCE: ICD-10-CM

## 2024-08-14 DIAGNOSIS — Z95.0 PACEMAKER: ICD-10-CM

## 2024-08-14 DIAGNOSIS — R05.3 PERSISTENT COUGH: ICD-10-CM

## 2024-08-14 DIAGNOSIS — Z00.00 WELL ADULT EXAM: ICD-10-CM

## 2024-08-14 DIAGNOSIS — I48.19 PERSISTENT ATRIAL FIBRILLATION (HCC): ICD-10-CM

## 2024-08-14 DIAGNOSIS — L85.3 DRY SKIN DERMATITIS: Primary | ICD-10-CM

## 2024-08-14 DIAGNOSIS — E78.2 MIXED HYPERLIPIDEMIA: ICD-10-CM

## 2024-08-14 DIAGNOSIS — E03.9 ACQUIRED HYPOTHYROIDISM: ICD-10-CM

## 2024-08-14 DIAGNOSIS — J18.9 MULTIFOCAL PNEUMONIA: ICD-10-CM

## 2024-08-14 PROBLEM — R06.02 SHORTNESS OF BREATH: Status: RESOLVED | Noted: 2021-03-22 | Resolved: 2024-08-14

## 2024-08-14 PROCEDURE — 99214 OFFICE O/P EST MOD 30 MIN: CPT | Performed by: FAMILY MEDICINE

## 2024-08-14 PROCEDURE — G0439 PPPS, SUBSEQ VISIT: HCPCS | Performed by: FAMILY MEDICINE

## 2024-08-14 RX ORDER — KETOCONAZOLE 20 MG/G
CREAM TOPICAL DAILY
Qty: 60 G | Refills: 1 | Status: SHIPPED | OUTPATIENT
Start: 2024-08-14

## 2024-08-14 NOTE — PATIENT INSTRUCTIONS
Medicare Preventive Visit Patient Instructions  Thank you for completing your Welcome to Medicare Visit or Medicare Annual Wellness Visit today. Your next wellness visit will be due in one year (8/15/2025).  The screening/preventive services that you may require over the next 5-10 years are detailed below. Some tests may not apply to you based off risk factors and/or age. Screening tests ordered at today's visit but not completed yet may show as past due. Also, please note that scanned in results may not display below.  Preventive Screenings:  Service Recommendations Previous Testing/Comments   Colorectal Cancer Screening  Colonoscopy    Fecal Occult Blood Test (FOBT)/Fecal Immunochemical Test (FIT)  Fecal DNA/Cologuard Test  Flexible Sigmoidoscopy Age: 45-75 years old   Colonoscopy: every 10 years (May be performed more frequently if at higher risk)  OR  FOBT/FIT: every 1 year  OR  Cologuard: every 3 years  OR  Sigmoidoscopy: every 5 years  Screening may be recommended earlier than age 45 if at higher risk for colorectal cancer. Also, an individualized decision between you and your healthcare provider will decide whether screening between the ages of 76-85 would be appropriate. Colonoscopy: 02/08/2018  FOBT/FIT: Not on file  Cologuard: Not on file  Sigmoidoscopy: Not on file    Screening Not Indicated     Prostate Cancer Screening Individualized decision between patient and health care provider in men between ages of 55-69   Medicare will cover every 12 months beginning on the day after your 50th birthday PSA: 0.6 ng/mL     Screening Not Indicated     Hepatitis C Screening Once for adults born between 1945 and 1965  More frequently in patients at high risk for Hepatitis C Hep C Antibody: 01/12/2022    Screening Current   Diabetes Screening 1-2 times per year if you're at risk for diabetes or have pre-diabetes Fasting glucose: 83 mg/dL (6/10/2024)  A1C: No results in last 5 years (No results in last 5  years)  Screening Current   Cholesterol Screening Once every 5 years if you don't have a lipid disorder. May order more often based on risk factors. Lipid panel: 06/10/2024  Screening Not Indicated  History Lipid Disorder      Other Preventive Screenings Covered by Medicare:  Abdominal Aortic Aneurysm (AAA) Screening: covered once if your at risk. You're considered to be at risk if you have a family history of AAA or a male between the age of 65-75 who smoking at least 100 cigarettes in your lifetime.  Lung Cancer Screening: covers low dose CT scan once per year if you meet all of the following conditions: (1) Age 55-77; (2) No signs or symptoms of lung cancer; (3) Current smoker or have quit smoking within the last 15 years; (4) You have a tobacco smoking history of at least 20 pack years (packs per day x number of years you smoked); (5) You get a written order from a healthcare provider.  Glaucoma Screening: covered annually if you're considered high risk: (1) You have diabetes OR (2) Family history of glaucoma OR (3)  aged 50 and older OR (4)  American aged 65 and older  Osteoporosis Screening: covered every 2 years if you meet one of the following conditions: (1) Have a vertebral abnormality; (2) On glucocorticoid therapy for more than 3 months; (3) Have primary hyperparathyroidism; (4) On osteoporosis medications and need to assess response to drug therapy.  HIV Screening: covered annually if you're between the age of 15-65. Also covered annually if you are younger than 15 and older than 65 with risk factors for HIV infection. For pregnant patients, it is covered up to 3 times per pregnancy.    Immunizations:  Immunization Recommendations   Influenza Vaccine Annual influenza vaccination during flu season is recommended for all persons aged >= 6 months who do not have contraindications   Pneumococcal Vaccine   * Pneumococcal conjugate vaccine = PCV13 (Prevnar 13), PCV15 (Vaxneuvance),  PCV20 (Prevnar 20)  * Pneumococcal polysaccharide vaccine = PPSV23 (Pneumovax) Adults 19-63 yo with certain risk factors or if 65+ yo  If never received any pneumonia vaccine: recommend Prevnar 20 (PCV20)  Give PCV20 if previously received 1 dose of PCV13 or PPSV23   Hepatitis B Vaccine 3 dose series if at intermediate or high risk (ex: diabetes, end stage renal disease, liver disease)   Respiratory syncytial virus (RSV) Vaccine - COVERED BY MEDICARE PART D  * RSVPreF3 (Arexvy) CDC recommends that adults 60 years of age and older may receive a single dose of RSV vaccine using shared clinical decision-making (SCDM)   Tetanus (Td) Vaccine - COST NOT COVERED BY MEDICARE PART B Following completion of primary series, a booster dose should be given every 10 years to maintain immunity against tetanus. Td may also be given as tetanus wound prophylaxis.   Tdap Vaccine - COST NOT COVERED BY MEDICARE PART B Recommended at least once for all adults. For pregnant patients, recommended with each pregnancy.   Shingles Vaccine (Shingrix) - COST NOT COVERED BY MEDICARE PART B  2 shot series recommended in those 19 years and older who have or will have weakened immune systems or those 50 years and older     Health Maintenance Due:      Topic Date Due   • Hepatitis C Screening  Completed     Immunizations Due:      Topic Date Due   • COVID-19 Vaccine (1 - 2023-24 season) Never done   • Influenza Vaccine (1) 09/01/2024     Advance Directives   What are advance directives?  Advance directives are legal documents that state your wishes and plans for medical care. These plans are made ahead of time in case you lose your ability to make decisions for yourself. Advance directives can apply to any medical decision, such as the treatments you want, and if you want to donate organs.   What are the types of advance directives?  There are many types of advance directives, and each state has rules about how to use them. You may choose a  combination of any of the following:  Living will:  This is a written record of the treatment you want. You can also choose which treatments you do not want, which to limit, and which to stop at a certain time. This includes surgery, medicine, IV fluid, and tube feedings.   Durable power of  for healthcare (DPAHC):  This is a written record that states who you want to make healthcare choices for you when you are unable to make them for yourself. This person, called a proxy, is usually a family member or a friend. You may choose more than 1 proxy.  Do not resuscitate (DNR) order:  A DNR order is used in case your heart stops beating or you stop breathing. It is a request not to have certain forms of treatment, such as CPR. A DNR order may be included in other types of advance directives.  Medical directive:  This covers the care that you want if you are in a coma, near death, or unable to make decisions for yourself. You can list the treatments you want for each condition. Treatment may include pain medicine, surgery, blood transfusions, dialysis, IV or tube feedings, and a ventilator (breathing machine).  Values history:  This document has questions about your views, beliefs, and how you feel and think about life. This information can help others choose the care that you would choose.  Why are advance directives important?  An advance directive helps you control your care. Although spoken wishes may be used, it is better to have your wishes written down. Spoken wishes can be misunderstood, or not followed. Treatments may be given even if you do not want them. An advance directive may make it easier for your family to make difficult choices about your care.       © Copyright [employer] 2018 Information is for End User's use only and may not be sold, redistributed or otherwise used for commercial purposes. All illustrations and images included in CareNotes® are the copyrighted property of A.D.A.M., Inc.  or [employer]

## 2024-08-14 NOTE — PROGRESS NOTES
Ambulatory Visit  Name: Simba Dawn Sr.      : 1945      MRN: [id number]  Encounter Provider: Dontae Terrazas MD  Encounter Date: 2024   Encounter department: [facility]    Assessment & Plan   1. Dry skin dermatitis  -     ketoconazole (NIZORAL) 2 % cream; Apply topically daily  2. Well adult exam  3. Acquired hypothyroidism  Assessment & Plan:  -stable/controlled, continue same medication. Will evaluate again next visit     4. Statin intolerance  Assessment & Plan:  Monitoring   5. Persistent cough  Assessment & Plan:  -stable/controlled, continue same medication. Will evaluate again next visit     6. Persistent atrial fibrillation (HCC)  Assessment & Plan:  -stable/controlled, continue same medication. Will evaluate again next visit     7. Pacemaker  8. Mixed hyperlipidemia  Assessment & Plan:  No meds   9. Multifocal pneumonia  Comments:  follows with pulm      Assessment & Plan  1. Multifocal pneumonia.  The patient's condition is gradually improving with the use of a 3% saline nebulizer twice a day. He reports less coughing and spitting up, indicating progress. The potential benefits of a bronchoscopy and bronchial alveolar lavage (BAL) were discussed, but he prefers to continue with the current treatment. A follow-up appointment with Dr. Gottlieb is recommended in 2025.     2. Persistent cough.  The persistent cough is showing signs of improvement with the current nebulizer treatment. He reports increased energy levels and less mucus production. No new CAT scan is scheduled, but it may be considered during the next visit with Dr. Gottlieb    3. Bronchiolitis.  The current treatment regimen with the 3% saline nebulizer is helping to manage the bronchiolitis. The patient is advised to continue this treatment. The possibility of a bronchoscopy was discussed but not pursued at this time.    4. Elevated cholesterol.  Lab results from 2024 indicate elevated cholesterol  Bloody urine draining around nephrostomy and with stent in place  Nephrostomy removed today and we will see how he does. levels. He is advised to continue monitoring his cholesterol and maintain a healthy diet. Annual blood work is recommended, with the next one due in June 2025.    5. Skin peeling.  A prescription for ketoconazole 10% cream will be sent to [facility]. This treatment has been effective in the past for managing his skin condition.    6. Health Maintenance.  He was provided with a document detailing end-of-life care options for his consideration. He is advised to bring in his advanced directive or power of  documents for scanning into his medical file.          Depression Screening and Follow-up Plan: Patient was screened for depression during today's encounter. They screened negative with a PHQ-2 score of 0.      Preventive health issues were discussed with patient, and age appropriate screening tests were ordered as noted in patient's After Visit Summary. Personalized health advice and appropriate referrals for health education or preventive services given if needed, as noted in patient's After Visit Summary.    History of Present Illness     HPI     History of Present Illness  The patient is an 79-year-old male who presents for evaluation of multiple medical concerns.    He reports feeling well overall, with no chest pain or palpitations. He has been using a nebulizer twice daily and notes improvement in his condition. His coughing episodes vary, with some resulting in significant expectoration, while others are mild with minimal sputum production. He feels his energy levels have returned to normal. He has declined the option of bronchoscopy and anticipates needing the nebulizer indefinitely. He has been practicing deep breathing exercises, which initially led to increased sputum production, but this has since reduced. He does not have a scheduled CT scan but expects one to be ordered during his next visit with Dr. Phoenix in January 2025.    He has not visited a dermatologist for a couple of years due to the  COVID-19 pandemic. He has been experiencing skin peeling and was previously prescribed ketoconazole, which he found effective. He requests a refill of this prescription. He also mentions that his fingers are no longer sore and the cracking has not yet started.    He has an advanced directive, power of  for healthcare, and a living will in place. His son has a copy of it, and he also keeps one. He had a pizza yesterday, which caused stomach pain that resolved after taking Pepcid.      Patient Care Team:  Dontae Terrazas MD as PCP - General (Family Medicine)    Review of Systems   Constitutional:  Negative for chills and fever.   HENT:  Negative for ear pain and sore throat.    Eyes:  Negative for pain and visual disturbance.   Respiratory:  Positive for cough. Negative for shortness of breath.    Cardiovascular:  Negative for chest pain and palpitations.   Gastrointestinal:  Negative for abdominal pain and vomiting.   Genitourinary:  Negative for dysuria and hematuria.   Musculoskeletal:  Negative for arthralgias and back pain.   Skin:  Negative for color change and rash.   Neurological:  Negative for seizures and syncope.   All other systems reviewed and are negative.    Medical History Reviewed by provider this encounter:  Tobacco  Allergies  Meds  Problems  Med Hx  Surg Hx  Fam Hx       Annual Wellness Visit Questionnaire   Simba is here for his Subsequent Wellness visit. Last Medicare Wellness visit information reviewed, patient interviewed and updates made to the record today.      Health Risk Assessment:   Patient rates overall health as good. Patient feels that their physical health rating is same. Patient is very satisfied with their life. Eyesight was rated as same. Hearing was rated as same. Patient feels that their emotional and mental health rating is same. Patients states they are never, rarely angry. Patient states they are never, rarely unusually tired/fatigued. Pain experienced in the  last 7 days has been none. Patient states that he has experienced no weight loss or gain in last 6 months.     Depression Screening:   PHQ-2 Score: 0      Fall Risk Screening:   In the past year, patient has experienced: no history of falling in past year      Home Safety:  Patient does not have trouble with stairs inside or outside of their home. Patient has working smoke alarms and has no working carbon monoxide detector. Home safety hazards include: none.     Nutrition:   Current diet is Regular.     Medications:   Patient is currently taking over-the-counter supplements. OTC medications include: see medication list. Patient is able to manage medications.     Activities of Daily Living (ADLs)/Instrumental Activities of Daily Living (IADLs):   Walk and transfer into and out of bed and chair?: Yes  Dress and groom yourself?: Yes    Bathe or shower yourself?: Yes    Feed yourself? Yes  Do your laundry/housekeeping?: Yes  Manage your money, pay your bills and track your expenses?: Yes  Make your own meals?: Yes    Do your own shopping?: Yes    Previous Hospitalizations:   Any hospitalizations or ED visits within the last 12 months?: No    How many hospitalizations have you had in the last year?: 1-2    Advance Care Planning:   Living will: No    Durable POA for healthcare: No    Advanced directive: No    ACP document given: Yes      Cognitive Screening:   Provider or family/friend/caregiver concerned regarding cognition?: No    PREVENTIVE SCREENINGS      Cardiovascular Screening:    General: Screening Not Indicated and History Lipid Disorder      Diabetes Screening:     General: Screening Current      Colorectal Cancer Screening:     General: Screening Not Indicated      Prostate Cancer Screening:    General: Screening Not Indicated      Osteoporosis Screening:    General: Screening Not Indicated      Abdominal Aortic Aneurysm (AAA) Screening:    Risk factors include: tobacco use        Lung Cancer Screening:      "General: Screening Not Indicated      Hepatitis C Screening:    General: Screening Current    Screening, Brief Intervention, and Referral to Treatment (SBIRT)    Screening  Typical number of drinks in a day: 0  Typical number of drinks in a week: 0  Interpretation: Low risk drinking behavior.    Single Item Drug Screening:  How often have you used an illegal drug (including marijuana) or a prescription medication for non-medical reasons in the past year? never    Single Item Drug Screen Score: 0  Interpretation: Negative screen for possible drug use disorder    Brief Intervention  Alcohol & drug use screenings were reviewed. No concerns regarding substance use disorder identified.     Social Determinants of Health     Financial Resource Strain: Low Risk  (6/8/2023)    Overall Financial Resource Strain (CARDIA)     Difficulty of Paying Living Expenses: Not hard at all   Food Insecurity: No Food Insecurity (8/14/2024)    Hunger Vital Sign     Worried About Running Out of Food in the Last Year: Never true     Ran Out of Food in the Last Year: Never true   Transportation Needs: No Transportation Needs (8/14/2024)    PRAPARE - Transportation     Lack of Transportation (Medical): No     Lack of Transportation (Non-Medical): No   Housing Stability: Low Risk  (8/14/2024)    Housing Stability Vital Sign     Unable to Pay for Housing in the Last Year: No     Number of Times Moved in the Last Year: 1     Homeless in the Last Year: No   Utilities: Not At Risk (8/14/2024)    [facility] Utilities     Threatened with loss of utilities: No     No results found.    Objective     /78 (BP Location: Left arm, Patient Position: Sitting, Cuff Size: Standard)   Pulse 78   Resp 16   Ht 5' 9\" (1.753 m)   Wt 64 kg (141 lb)   SpO2 98%   BMI 20.82 kg/m²     Physical Exam  Vitals and nursing note reviewed.   Constitutional:       Appearance: He is well-developed.   HENT:      Head: Normocephalic and atraumatic.   Eyes:      " Conjunctiva/sclera: Conjunctivae normal.      Pupils: Pupils are equal, round, and reactive to light.   Cardiovascular:      Rate and Rhythm: Normal rate and regular rhythm.      Heart sounds: Normal heart sounds.   Pulmonary:      Effort: Pulmonary effort is normal.      Breath sounds: Normal breath sounds. No wheezing or rales.   Abdominal:      General: Bowel sounds are normal. There is no distension.      Palpations: Abdomen is soft.      Tenderness: There is no abdominal tenderness.   Musculoskeletal:         General: No tenderness. Normal range of motion.      Cervical back: Normal range of motion and neck supple.   Skin:     General: Skin is warm and dry.      Findings: No rash.   Neurological:      Mental Status: He is alert and oriented to person, place, and time.      Cranial Nerves: No cranial nerve deficit.      Sensory: No sensory deficit.      Coordination: Coordination normal.   Psychiatric:         Behavior: Behavior normal.         Thought Content: Thought content normal.         Judgment: Judgment normal.

## 2024-08-20 LAB
MYCOBACTERIUM SPEC CULT: NORMAL
RHODAMINE-AURAMINE STN SPEC: NORMAL

## 2024-08-27 LAB
MYCOBACTERIUM SPEC CULT: NORMAL
RHODAMINE-AURAMINE STN SPEC: NORMAL

## 2024-09-03 LAB
MYCOBACTERIUM SPEC CULT: NORMAL
RHODAMINE-AURAMINE STN SPEC: NORMAL

## 2024-09-19 DIAGNOSIS — E03.9 ACQUIRED HYPOTHYROIDISM: ICD-10-CM

## 2024-09-19 RX ORDER — LEVOTHYROXINE SODIUM 75 UG/1
75 TABLET ORAL
Qty: 90 TABLET | Refills: 1 | Status: SHIPPED | OUTPATIENT
Start: 2024-09-19

## 2024-11-04 ENCOUNTER — IN-CLINIC DEVICE VISIT (OUTPATIENT)
Dept: CARDIOLOGY CLINIC | Facility: CLINIC | Age: 79
End: 2024-11-04
Payer: MEDICARE

## 2024-11-04 DIAGNOSIS — Z95.0 CARDIAC PACEMAKER IN SITU: Primary | ICD-10-CM

## 2024-11-04 PROCEDURE — 93280 PM DEVICE PROGR EVAL DUAL: CPT | Performed by: INTERNAL MEDICINE

## 2024-11-04 NOTE — PROGRESS NOTES
Results for orders placed or performed in visit on 11/04/24   Cardiac EP device report    Narrative    MDT DUAL PM/ ACTIVE SYSTEM IS MRI CONDITIONAL  DEVICE INTERROGATED IN THE [facility]: BATTERY VOLTAGE ADEQUATE (8.5 YRS). AP 70.4%.  97.5% (>40%, DDDR 60, SSS). NO SIGNIFICANT HIGH RATE EPISODES. HISTORY OF PAF, PT TAKES XARELTO AND SOTALOL. ALL LEAD PARAMETERS WITHIN NORMAL LIMITS. DECREASE MADE TO ATRIAL SENSITIVTY FOR 2:1 SAFETY MARGIN. PVAB METHOD TO PARTIAL+ FOR FARFIELD OVERSENSING ON ATRIAL CHANNEL. NORMAL DEVICE FUNCTION. CJC/ES

## 2025-01-17 ENCOUNTER — OFFICE VISIT (OUTPATIENT)
Dept: PULMONOLOGY | Facility: CLINIC | Age: 80
End: 2025-01-17
Payer: MEDICARE

## 2025-01-17 VITALS
SYSTOLIC BLOOD PRESSURE: 126 MMHG | HEART RATE: 63 BPM | BODY MASS INDEX: 21.06 KG/M2 | TEMPERATURE: 97.5 F | DIASTOLIC BLOOD PRESSURE: 62 MMHG | OXYGEN SATURATION: 92 % | WEIGHT: 142.6 LBS

## 2025-01-17 DIAGNOSIS — J21.9 BRONCHIOLITIS: Primary | ICD-10-CM

## 2025-01-17 DIAGNOSIS — R05.3 PERSISTENT COUGH: ICD-10-CM

## 2025-01-17 PROCEDURE — 99214 OFFICE O/P EST MOD 30 MIN: CPT | Performed by: INTERNAL MEDICINE

## 2025-01-17 PROCEDURE — G2211 COMPLEX E/M VISIT ADD ON: HCPCS | Performed by: INTERNAL MEDICINE

## 2025-01-17 RX ORDER — SODIUM CHLORIDE FOR INHALATION 3 %
4 VIAL, NEBULIZER (ML) INHALATION 2 TIMES DAILY
COMMUNITY
Start: 2024-12-01

## 2025-01-17 RX ORDER — ALBUTEROL SULFATE 0.83 MG/ML
2.5 SOLUTION RESPIRATORY (INHALATION) 2 TIMES DAILY
Qty: 180 ML | Refills: 2 | Status: SHIPPED | OUTPATIENT
Start: 2025-01-17 | End: 2025-01-21 | Stop reason: SDUPTHER

## 2025-01-17 RX ORDER — ALBUTEROL SULFATE 0.83 MG/ML
2.5 SOLUTION RESPIRATORY (INHALATION) 2 TIMES DAILY
COMMUNITY
Start: 2024-10-27 | End: 2025-01-17 | Stop reason: SDUPTHER

## 2025-01-17 NOTE — PROGRESS NOTES
"    Follow Up - Pulmonary Medicine   Simba Dawn Sr. 80 y.o. male MRN: [id number]      Simba Dawn Sr. is a 80 y.o. male who presents for follow up of chronic cough and abnormal CT.    Bronchiolitis on CT  Chronic Cough  Pt with cough for 8+ months. CT Chest with extensive tree in bud, scattered nodules and consolidation in RML and lingula. Dx includes MAC vs chronic aspiration. Pt has GERD and patulous esophagus with many years of dysphagia to solid food and some liquids. Undergoing GI workup, overall had \"risk of aspiration\" on barium swallow but with small, slow sips and small bites, no overt aspiration.   Sputum culture with normal respiratory mahi and Afb also negative in the past. With mucous clearance therapy, cough and dyspnea significantly improved, and lugns sound clear on exam. CT Chest with waxing and waning reticulonodular infiltrates, persistent tree in bud, mucoid impaction.   Eos low    Negative sputum bacterial and AFB cultures in 2023 and 2024.  No recent infections or acute exacerbations     - discussed risks and benefits of bronchi and BAL to send for micro, because pt feels cough and Sob improving and no infectious symptoms, reasonable to hold off  - repeat sputum yearly for bacterial cultures and AFB  - airway clearance regimen: albuterol followed by 3% saline nebulizer BID  --- reviewed nebulizer technique  --- reordered albuterol+ saline  --- flutter valve given  - continue GI follow up and aspiration precautions  - can get another CT 1 year from prior (will order for spring/summer 2025)     f/u 6 month     Weight loss  Poor appetite, weight loss saw Nutrition for malnutrition/weight loss.   - Has been eating better and weight stable    Vaccines  - COVID unknown  - Flu UTD  - PNA UTD    Immunization History   Administered Date(s) Administered    INFLUENZA 09/22/2020, 10/04/2021, 12/06/2022, 09/07/2023    Influenza Split High Dose Preservative Free IM 09/09/2024    Influenza, " Seasonal Vaccine, Quadrivalent, Adjuvanted, .5e 09/07/2023    Influenza, high dose seasonal 0.7 mL 12/06/2022    Pneumococcal 03/23/2009    Pneumococcal Conjugate 13-Valent 10/07/2015    Pneumococcal Conjugate PCV 7 03/23/2009    Pneumococcal Polysaccharide PPV23 12/01/2021    influenza, trivalent, adjuvanted 09/22/2020      I have spent a total time of 30 minutes on 01/17/25 in caring for this patient including Diagnostic results, Prognosis, Risks and benefits of tx options, Instructions for management, Patient and family education, Importance of tx compliance, Risk factor reductions, Impressions, Counseling / Coordination of care, Documenting in the medical record, Reviewing / ordering tests, medicine, procedures  , and Obtaining or reviewing history  .   ______________________________    Interval Hx:  Doing mucous clearance w acapella but sometimes misses days  Still having some cough  One day did have blood tinged mucous which went away      HPI:    Simba Dawn Sr. is a 80 y.o. male who presents for evaluaiton of chornic cough and abnormal CT. PMH includes GERD, Afib, CHF, RCC (left) sp cryoablation in 2020 with no recurrence.    Pt reports chronic cough for 8 months. Got 10day course abx (ceftin and azithromycin) x2 which improved symptoms initially but then cough came back.  Bring up mucous and phlegm, yellowish gray  Does report some dysphagia for many years, worse with pills and solid food than liquid. Also with significant GERD, PPI didn't help so he stopped using  No fevers, chills. Did have night sweats a few times in January but this resolved.   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    Current Medications:    Current Outpatient Medications:     Ascorbic Acid (vitamin C) 1000 MG tablet, Take 1,000 mg by mouth daily, Disp: , Rfl:     cholecalciferol (VITAMIN D3) 1,000 units tablet, Take 1,000 Units by mouth daily, Disp: , Rfl:     Coenzyme Q10 (CoQ10) 100 MG CAPS, Take 2 capsules (200 mg total) by mouth daily, Disp: 60 capsule, Rfl: 0    ketoconazole (NIZORAL) 2 % cream, Apply topically daily, Disp: 60 g, Rfl: 1    levothyroxine 75 mcg tablet, TAKE 1 TABLET (75 MCG TOTAL) BY MOUTH DAILY IN THE EARLY MORNING, Disp: 90 tablet, Rfl: 1    magnesium oxide (MAG-OX) 400 mg, Take 400 mg by mouth daily, Disp: , Rfl:     melatonin 3 mg, Take by mouth daily at bedtime, Disp: , Rfl:     Probiotic Product (CULTURELLE PROBIOTICS PO), Take by mouth, Disp: , Rfl:     rivaroxaban (XARELTO) 20 mg tablet, Take 1 tablet (20 mg total) by mouth daily after dinner, Disp: 90 tablet, Rfl: 3    sotalol (BETAPACE) 80 mg tablet, Take 1 tablet (80 mg total) by mouth every 12 (twelve) hours, Disp: 180 tablet, Rfl: 3    vitamin B-12 (VITAMIN B-12) 1,000 mcg tablet, Take by mouth daily, Disp: , Rfl:     Historical Information   Past Medical History:   Diagnosis Date    Dyslipidemia     Erectile dysfunction of non-organic origin     Gastroesophageal reflux     Hyperlipidemia     Hypothyroidism     Liver disease     Malignant neoplasm of kidney (HCC)     Left    Malignant neoplasm of left kidney, except renal pelvis (HCC)     Pure hypercholesterolemia     Thyroid disease     Vitamin D deficiency, unspecified      Past Surgical History:   Procedure Laterality Date    CARDIAC PACEMAKER PLACEMENT      CATARACT EXTRACTION Bilateral 2014    CYSTOSCOPY      EGD AND COLONOSCOPY  12/15/2009    HERNIA REPAIR Left     Inguinal    URETHRA SURGERY  05/2010    Done by Dr. Mohan      Social History   Social History     Tobacco Use   Smoking Status  "Former   Smokeless Tobacco Never   Tobacco Comments    quit at age 20       Family History:   Family History   Problem Relation Age of Onset    Cancer Mother         bladder    Heart disease Mother     Prostate cancer Father     Stroke Father     Heart disease Father     Hyperlipidemia Family          PhysicalExamination:  Vitals:   /62 (BP Location: Right arm, Patient Position: Sitting, Cuff Size: Standard)   Pulse 63   Temp 97.5 °F (36.4 °C)   Wt 64.7 kg (142 lb 9.6 oz)   SpO2 92%   BMI 21.06 kg/m²     Appearance -- thin elderly man, NAD  HEENT -- anicteric sclera, clear OP, MMM  Neck -- no JVD  Heart -- RRR, no murmurs  Lungs -- clear lungs  Abdomen -- soft, NTND, +bs  Extremities -- WWP, no LE edema  Skin -- no rash  Neuro -- A&Ox3, wnl  Psych -- no obvious depression or hallucination        Diagnostic Data:  Labs:  I personally reviewed the most recent laboratory data pertinent to today's visit    Lab Results   Component Value Date    WBC 4.90 06/10/2024    HGB 13.0 06/10/2024    HCT 40.2 06/10/2024     (H) 06/10/2024     06/10/2024     Lab Results   Component Value Date    CALCIUM 9.6 06/10/2024    K 4.5 06/10/2024    CO2 31 06/10/2024     06/10/2024    BUN 24 06/10/2024    CREATININE 0.84 06/10/2024     No results found for: \"IGE\"  Lab Results   Component Value Date    ALT 17 06/10/2024    AST 23 06/10/2024    ALKPHOS 70 06/10/2024       PFT results:  The most recent pulmonary function tests were reviewed.  NONE    Imaging:  I personally reviewed the images on the PAC system pertinent to today's visit  CT Chest 8/2023:  Extensive bilateral tree-in-bud nodularity and confluent nodules involving all lobes of both lungs with consolidation in the right middle lobe and inferior lingula, present since June 2023, new since June 2021. This could be atypical mycobacterial infection. Recommend pulmonary referral.  Also with patulous esophagus    CT Chest 6.2024:   Previous right apical " paramediastinal consolidation has improved. A mild lingular basilar paramediastinal consolidation has worsened.  2.  Otherwise, findings of tree-in-bud nodularity, mucoid impaction, and multifocal consolidation persist, again favoring atypical mycobacterial infection, with chronic aspiration considered less likely.        Emmanuelle Mathews MD  [facility]

## 2025-01-21 ENCOUNTER — TELEPHONE (OUTPATIENT)
Age: 80
End: 2025-01-21

## 2025-01-21 DIAGNOSIS — R05.3 PERSISTENT COUGH: ICD-10-CM

## 2025-01-21 DIAGNOSIS — J98.01 BRONCHOSPASM: Primary | ICD-10-CM

## 2025-01-21 DIAGNOSIS — J21.9 BRONCHIOLITIS: ICD-10-CM

## 2025-01-21 RX ORDER — ALBUTEROL SULFATE 0.83 MG/ML
2.5 SOLUTION RESPIRATORY (INHALATION) 2 TIMES DAILY
Qty: 180 ML | Refills: 2 | Status: SHIPPED | OUTPATIENT
Start: 2025-01-21 | End: 2025-01-24 | Stop reason: SDUPTHER

## 2025-01-21 NOTE — TELEPHONE ENCOUNTER
Dr. Mathews    Albuterol 0.083% Neb Solution    Medication not covered with current dx codes.  Please advise.

## 2025-01-22 DIAGNOSIS — I48.20 CHRONIC ATRIAL FIBRILLATION (HCC): ICD-10-CM

## 2025-01-24 ENCOUNTER — TELEPHONE (OUTPATIENT)
Age: 80
End: 2025-01-24

## 2025-01-24 DIAGNOSIS — E44.0 MODERATE PROTEIN-CALORIE MALNUTRITION (HCC): Primary | ICD-10-CM

## 2025-01-24 DIAGNOSIS — J21.9 BRONCHIOLITIS: ICD-10-CM

## 2025-01-24 DIAGNOSIS — J98.01 BRONCHOSPASM: ICD-10-CM

## 2025-01-24 DIAGNOSIS — J45.909 UNCOMPLICATED ASTHMA, UNSPECIFIED ASTHMA SEVERITY, UNSPECIFIED WHETHER PERSISTENT: ICD-10-CM

## 2025-01-24 RX ORDER — ALBUTEROL SULFATE 0.83 MG/ML
2.5 SOLUTION RESPIRATORY (INHALATION) 2 TIMES DAILY
Qty: 180 ML | Refills: 0 | Status: SHIPPED | OUTPATIENT
Start: 2025-01-24 | End: 2025-01-24

## 2025-01-24 RX ORDER — ALBUTEROL SULFATE 0.83 MG/ML
2.5 SOLUTION RESPIRATORY (INHALATION) EVERY 6 HOURS PRN
Qty: 180 ML | Refills: 8 | Status: SHIPPED | OUTPATIENT
Start: 2025-01-24

## 2025-01-24 RX ORDER — ALBUTEROL SULFATE 0.83 MG/ML
2.5 SOLUTION RESPIRATORY (INHALATION) EVERY 6 HOURS PRN
Qty: 180 ML | Refills: 8 | Status: SHIPPED | OUTPATIENT
Start: 2025-01-24 | End: 2025-01-24

## 2025-01-24 NOTE — TELEPHONE ENCOUNTER
Spoke with pharmacy and confirmed they have received the code for asthma. They mentioned that the issue is the decimal point doesn't show up for the code when it gets sent over and that is part of the problem. They are wondering if for now when sending script in the note area add the ICD-10 code and emphasize the decimal point.     Informed it gets sent over electronically but they ask if we can try just so this problem doesn't occur again.

## 2025-01-24 NOTE — TELEPHONE ENCOUNTER
Jennifer from [facility] is calling to ask for an ICD 10 code for the albuterol nebulizer solution as they have j98.1. I provided her with the attached ICD code on the script. She says that diagnosis code is not being accepted in their system and they would need a new code in order to fill the script. They can be reached at [phone].    Please advise.

## 2025-01-24 NOTE — TELEPHONE ENCOUNTER
ICD code  J98.1 for Albuterol nebulizer medication is not being accepted at pharmacy, please advise.

## 2025-02-06 ENCOUNTER — RESULTS FOLLOW-UP (OUTPATIENT)
Dept: CARDIOLOGY CLINIC | Facility: CLINIC | Age: 80
End: 2025-02-06

## 2025-02-06 ENCOUNTER — REMOTE DEVICE CLINIC VISIT (OUTPATIENT)
Dept: CARDIOLOGY CLINIC | Facility: CLINIC | Age: 80
End: 2025-02-06
Payer: MEDICARE

## 2025-02-06 DIAGNOSIS — Z95.0 CARDIAC PACEMAKER IN SITU: Primary | ICD-10-CM

## 2025-02-06 PROCEDURE — 93296 REM INTERROG EVL PM/IDS: CPT | Performed by: INTERNAL MEDICINE

## 2025-02-06 PROCEDURE — 93294 REM INTERROG EVL PM/LDLS PM: CPT | Performed by: INTERNAL MEDICINE

## 2025-02-06 NOTE — PROGRESS NOTES
Results for orders placed or performed in visit on 02/06/25   Cardiac EP device report    Narrative    MDT DUAL PM/ ACTIVE SYSTEM IS MRI CONDITIONAL  CARELINK TRANSMISSION: BATTERY VOLTAGE ADEQUATE (8.2 YRS). AP-69%, >99% (>40% DDDR@60PPM/MVP OFF). ALL AVAILABLE LEAD PARAMETERS WITHIN NORMAL LIMITS. 1 NSVT EPISODE- 10 BEATS, AVG CL~340MS. EF-55% (VENKATESH 6/29/21). PT ON XARELTO & SOTALOL. NORMAL DEVICE FUNCTION. GV

## 2025-02-12 ENCOUNTER — OFFICE VISIT (OUTPATIENT)
Dept: FAMILY MEDICINE CLINIC | Facility: CLINIC | Age: 80
End: 2025-02-12
Payer: MEDICARE

## 2025-02-12 VITALS
SYSTOLIC BLOOD PRESSURE: 116 MMHG | DIASTOLIC BLOOD PRESSURE: 72 MMHG | HEIGHT: 69 IN | BODY MASS INDEX: 21.18 KG/M2 | HEART RATE: 79 BPM | WEIGHT: 143 LBS | OXYGEN SATURATION: 100 %

## 2025-02-12 DIAGNOSIS — K21.9 GASTROESOPHAGEAL REFLUX DISEASE WITHOUT ESOPHAGITIS: ICD-10-CM

## 2025-02-12 DIAGNOSIS — E55.9 VITAMIN D DEFICIENCY: ICD-10-CM

## 2025-02-12 DIAGNOSIS — I50.32 HYPERTENSIVE HEART DISEASE WITH CHRONIC DIASTOLIC CONGESTIVE HEART FAILURE (HCC): ICD-10-CM

## 2025-02-12 DIAGNOSIS — L85.3 DRY SKIN DERMATITIS: Primary | ICD-10-CM

## 2025-02-12 DIAGNOSIS — I11.0 HYPERTENSIVE HEART DISEASE WITH CHRONIC DIASTOLIC CONGESTIVE HEART FAILURE (HCC): ICD-10-CM

## 2025-02-12 DIAGNOSIS — E78.2 MIXED HYPERLIPIDEMIA: ICD-10-CM

## 2025-02-12 DIAGNOSIS — E53.8 B12 DEFICIENCY: ICD-10-CM

## 2025-02-12 DIAGNOSIS — Z79.899 OTHER LONG TERM (CURRENT) DRUG THERAPY: ICD-10-CM

## 2025-02-12 DIAGNOSIS — E03.9 ACQUIRED HYPOTHYROIDISM: ICD-10-CM

## 2025-02-12 DIAGNOSIS — C64.2 RENAL CELL ADENOCARCINOMA, LEFT (HCC): ICD-10-CM

## 2025-02-12 DIAGNOSIS — I50.32 CHRONIC DIASTOLIC (CONGESTIVE) HEART FAILURE (HCC): ICD-10-CM

## 2025-02-12 DIAGNOSIS — Z78.9 STATIN INTOLERANCE: ICD-10-CM

## 2025-02-12 DIAGNOSIS — Z95.0 PACEMAKER: ICD-10-CM

## 2025-02-12 DIAGNOSIS — R05.3 PERSISTENT COUGH: ICD-10-CM

## 2025-02-12 DIAGNOSIS — I48.19 PERSISTENT ATRIAL FIBRILLATION (HCC): ICD-10-CM

## 2025-02-12 PROCEDURE — G2211 COMPLEX E/M VISIT ADD ON: HCPCS | Performed by: FAMILY MEDICINE

## 2025-02-12 PROCEDURE — 99214 OFFICE O/P EST MOD 30 MIN: CPT | Performed by: FAMILY MEDICINE

## 2025-02-12 RX ORDER — KETOCONAZOLE 20 MG/G
CREAM TOPICAL DAILY
Qty: 60 G | Refills: 1 | Status: SHIPPED | OUTPATIENT
Start: 2025-02-12

## 2025-02-12 RX ORDER — TRIAMCINOLONE ACETONIDE 1 MG/G
CREAM TOPICAL 2 TIMES DAILY
Qty: 45 G | Refills: 0 | Status: SHIPPED | OUTPATIENT
Start: 2025-02-12

## 2025-02-12 NOTE — ASSESSMENT & PLAN NOTE
Follows with cardiology, stable. Continue sotalol and Xarelto. Continue to monitor.     Orders:    CBC and differential; Future    Comprehensive metabolic panel; Future    Hemoglobin A1C; Future    Vitamin B12; Future    Vitamin D 25 hydroxy; Future    Lipid Panel with Direct LDL reflex; Future    TSH, 3rd generation with Free T4 reflex; Future

## 2025-02-12 NOTE — ASSESSMENT & PLAN NOTE
Stable, continue levothyroxine and will recheck TSH levels. Continue to monitor.     Orders:    CBC and differential; Future    Comprehensive metabolic panel; Future    Hemoglobin A1C; Future    Vitamin B12; Future    Vitamin D 25 hydroxy; Future    Lipid Panel with Direct LDL reflex; Future    TSH, 3rd generation with Free T4 reflex; Future

## 2025-02-12 NOTE — PROGRESS NOTES
Name: Simba Dawn .      : 1945      MRN: [id number]  Encounter Provider: Dontae Terarzas MD  Encounter Date: 2025   Encounter department: [facility]  :  Assessment & Plan  Dry skin dermatitis  Triamcinolone cream sent to pharmacy. Encouraged patient to refrain from applying hydrogen peroxide. Continue to monitor.     Orders:    CBC and differential; Future    Comprehensive metabolic panel; Future    Hemoglobin A1C; Future    Vitamin B12; Future    Vitamin D 25 hydroxy; Future    Lipid Panel with Direct LDL reflex; Future    TSH, 3rd generation with Free T4 reflex; Future    triamcinolone (KENALOG) 0.1 % cream; Apply topically 2 (two) times a day    ketoconazole (NIZORAL) 2 % cream; Apply topically daily    Persistent cough  Follows with pulmonology, improving. Continue albuterol and saline nebulizer treatments as directed.        Acquired hypothyroidism  Stable, continue levothyroxine and will recheck TSH levels. Continue to monitor.     Orders:    CBC and differential; Future    Comprehensive metabolic panel; Future    Hemoglobin A1C; Future    Vitamin B12; Future    Vitamin D 25 hydroxy; Future    Lipid Panel with Direct LDL reflex; Future    TSH, 3rd generation with Free T4 reflex; Future    Persistent atrial fibrillation (HCC)  Follows with cardiology, stable. Continue sotalol and Xarelto. Continue to monitor.     Orders:    CBC and differential; Future    Comprehensive metabolic panel; Future    Hemoglobin A1C; Future    Vitamin B12; Future    Vitamin D 25 hydroxy; Future    Lipid Panel with Direct LDL reflex; Future    TSH, 3rd generation with Free T4 reflex; Future    Pacemaker  HR normal in office. Follows with cardiology. Continue to monitor.     Orders:    CBC and differential; Future    Comprehensive metabolic panel; Future    Hemoglobin A1C; Future    Vitamin B12; Future    Vitamin D 25 hydroxy; Future    Lipid Panel with Direct LDL reflex; Future    TSH, 3rd  generation with Free T4 reflex; Future    Hypertensive heart disease with chronic diastolic congestive heart failure (HCC)  Wt Readings from Last 3 Encounters:   02/12/25 64.9 kg (143 lb)   01/17/25 64.7 kg (142 lb 9.6 oz)   08/14/24 64 kg (141 lb)     BP reviewed and at goal. Follows with cardiology, stable. Continue sotalol.                Mixed hyperlipidemia  Labs reviewed - cholesterol and LDL elevated but stable. Unable to tolerate statins due to severe myalgias. Will recheck lipid panel.        Statin intolerance         Gastroesophageal reflux disease without esophagitis  Stable, continue to take Tums at night as needed. Continue to monitor.        B12 deficiency  Continue supplementation and will recheck levels.     Orders:    CBC and differential; Future    Comprehensive metabolic panel; Future    Hemoglobin A1C; Future    Vitamin B12; Future    Vitamin D 25 hydroxy; Future    Lipid Panel with Direct LDL reflex; Future    TSH, 3rd generation with Free T4 reflex; Future    Vitamin D deficiency  Continue supplementation and will recheck levels.     Orders:    CBC and differential; Future    Comprehensive metabolic panel; Future    Hemoglobin A1C; Future    Vitamin B12; Future    Vitamin D 25 hydroxy; Future    Lipid Panel with Direct LDL reflex; Future    TSH, 3rd generation with Free T4 reflex; Future    Renal cell adenocarcinoma, left (HCC)  Stable, continue to monitor.     Orders:    CBC and differential; Future    Comprehensive metabolic panel; Future    Hemoglobin A1C; Future    Vitamin B12; Future    Vitamin D 25 hydroxy; Future    Lipid Panel with Direct LDL reflex; Future    TSH, 3rd generation with Free T4 reflex; Future    Chronic diastolic (congestive) heart failure (HCC)  Wt Readings from Last 3 Encounters:   02/12/25 64.9 kg (143 lb)   01/17/25 64.7 kg (142 lb 9.6 oz)   08/14/24 64 kg (141 lb)     Follows with cardiology. Stable - no shortness of breath, significant weight gain, or leg swelling.  "Continue to monitor.     Orders:    CBC and differential; Future    Comprehensive metabolic panel; Future    Hemoglobin A1C; Future    Vitamin B12; Future    Vitamin D 25 hydroxy; Future    Lipid Panel with Direct LDL reflex; Future    TSH, 3rd generation with Free T4 reflex; Future    Other long term (current) drug therapy    Orders:    CBC and differential; Future    Comprehensive metabolic panel; Future    Hemoglobin A1C; Future    Vitamin B12; Future    Vitamin D 25 hydroxy; Future    Lipid Panel with Direct LDL reflex; Future    TSH, 3rd generation with Free T4 reflex; Future           History of Present Illness   Simba Dawn Sr. Is a 80 y.o. male with a PMH of CHF, afib, hypothyroidism, and renal cell carcinoma who is presenting to the office for a f/u visit. Patient reports his chronic cough is improving on albuterol and saline nebulizer treatments twice a day. He also reports persistent acid reflux at night, but is improving with nightly Tums. Patient notes a small red patch on his arms that is recurrent - he treats them with triple antibiotic ointment and hydrogen peroxide. He wears hearing aids but reports clogged ears and muffled hearing. He reports good sleep lately and wakes up once a night to go to the bathroom. Patient reports he has a bowel movement every 2-3 days, which is normal for him. Patient follows with pulmonology and cardiology.       Review of Systems   Constitutional:  Negative for fatigue.   HENT:  Negative for congestion and ear pain.    Respiratory:  Positive for cough. Negative for shortness of breath and wheezing.    Cardiovascular:  Negative for chest pain and palpitations.   Gastrointestinal:  Negative for abdominal pain, constipation and diarrhea.   Skin:  Positive for rash.   Neurological:  Negative for dizziness, light-headedness and headaches.       Objective   /72   Pulse 79   Ht 5' 9\" (1.753 m)   Wt 64.9 kg (143 lb)   SpO2 100%   BMI 21.12 kg/m²      Physical " Exam  Vitals reviewed.   Constitutional:       Appearance: Normal appearance.   HENT:      Head: Normocephalic and atraumatic.      Right Ear: Tympanic membrane, ear canal and external ear normal. There is no impacted cerumen.      Left Ear: Tympanic membrane, ear canal and external ear normal. There is no impacted cerumen.      Ears:      Comments: Narrow ear canals bilaterally. Small amount of earwax present.   Cardiovascular:      Rate and Rhythm: Normal rate and regular rhythm.      Pulses: Normal pulses.      Heart sounds: Normal heart sounds. No murmur heard.  Pulmonary:      Effort: Pulmonary effort is normal.      Breath sounds: Normal breath sounds. No wheezing or rhonchi.   Abdominal:      General: Abdomen is flat.      Palpations: Abdomen is soft.      Tenderness: There is no abdominal tenderness.   Musculoskeletal:      Cervical back: Neck supple.   Lymphadenopathy:      Cervical: No cervical adenopathy.   Skin:     General: Skin is warm and dry.      Findings: Rash present.      Comments: 2cm round, flat, red lesion present on right forearm. Not open and no discharge noted. Not painful to palpation.    Neurological:      Mental Status: He is alert.

## 2025-02-12 NOTE — ASSESSMENT & PLAN NOTE
Wt Readings from Last 3 Encounters:   02/12/25 64.9 kg (143 lb)   01/17/25 64.7 kg (142 lb 9.6 oz)   08/14/24 64 kg (141 lb)     BP reviewed and at goal. Follows with cardiology, stable. Continue sotalol.

## 2025-02-12 NOTE — ASSESSMENT & PLAN NOTE
Wt Readings from Last 3 Encounters:   02/12/25 64.9 kg (143 lb)   01/17/25 64.7 kg (142 lb 9.6 oz)   08/14/24 64 kg (141 lb)     Follows with cardiology. Stable - no shortness of breath, significant weight gain, or leg swelling. Continue to monitor.     Orders:    CBC and differential; Future    Comprehensive metabolic panel; Future    Hemoglobin A1C; Future    Vitamin B12; Future    Vitamin D 25 hydroxy; Future    Lipid Panel with Direct LDL reflex; Future    TSH, 3rd generation with Free T4 reflex; Future

## 2025-02-12 NOTE — ASSESSMENT & PLAN NOTE
Follows with pulmonology, improving. Continue albuterol and saline nebulizer treatments as directed.

## 2025-02-12 NOTE — ASSESSMENT & PLAN NOTE
HR normal in office. Follows with cardiology. Continue to monitor.     Orders:    CBC and differential; Future    Comprehensive metabolic panel; Future    Hemoglobin A1C; Future    Vitamin B12; Future    Vitamin D 25 hydroxy; Future    Lipid Panel with Direct LDL reflex; Future    TSH, 3rd generation with Free T4 reflex; Future

## 2025-02-12 NOTE — ASSESSMENT & PLAN NOTE
Stable, continue to monitor.     Orders:    CBC and differential; Future    Comprehensive metabolic panel; Future    Hemoglobin A1C; Future    Vitamin B12; Future    Vitamin D 25 hydroxy; Future    Lipid Panel with Direct LDL reflex; Future    TSH, 3rd generation with Free T4 reflex; Future

## 2025-02-12 NOTE — ASSESSMENT & PLAN NOTE
Labs reviewed - cholesterol and LDL elevated but stable. Unable to tolerate statins due to severe myalgias. Will recheck lipid panel.

## 2025-02-14 DIAGNOSIS — I48.19 PERSISTENT ATRIAL FIBRILLATION (HCC): ICD-10-CM

## 2025-02-14 RX ORDER — SOTALOL HYDROCHLORIDE 80 MG/1
80 TABLET ORAL EVERY 12 HOURS
Qty: 180 TABLET | Refills: 3 | Status: SHIPPED | OUTPATIENT
Start: 2025-02-14 | End: 2025-02-20 | Stop reason: SDUPTHER

## 2025-02-16 PROBLEM — J21.9 BRONCHIOLITIS: Status: RESOLVED | Noted: 2024-02-22 | Resolved: 2025-02-16

## 2025-02-20 ENCOUNTER — OFFICE VISIT (OUTPATIENT)
Dept: CARDIOLOGY CLINIC | Facility: CLINIC | Age: 80
End: 2025-02-20
Payer: MEDICARE

## 2025-02-20 VITALS
DIASTOLIC BLOOD PRESSURE: 72 MMHG | WEIGHT: 142.7 LBS | HEART RATE: 76 BPM | HEIGHT: 69 IN | BODY MASS INDEX: 21.14 KG/M2 | SYSTOLIC BLOOD PRESSURE: 138 MMHG

## 2025-02-20 DIAGNOSIS — I48.19 PERSISTENT ATRIAL FIBRILLATION (HCC): Primary | ICD-10-CM

## 2025-02-20 DIAGNOSIS — I48.20 CHRONIC ATRIAL FIBRILLATION (HCC): ICD-10-CM

## 2025-02-20 PROCEDURE — 99214 OFFICE O/P EST MOD 30 MIN: CPT | Performed by: INTERNAL MEDICINE

## 2025-02-20 PROCEDURE — 93000 ELECTROCARDIOGRAM COMPLETE: CPT | Performed by: INTERNAL MEDICINE

## 2025-02-20 RX ORDER — SOTALOL HYDROCHLORIDE 80 MG/1
80 TABLET ORAL EVERY 12 HOURS
Qty: 180 TABLET | Refills: 3 | Status: SHIPPED | OUTPATIENT
Start: 2025-02-20

## 2025-02-20 NOTE — PROGRESS NOTES
HEART AND VASCULAR  CARDIAC ELECTROPHYSIOLOGY   HEART RHYTHM CENTER  Formerly [facility]    Outpatient   Follow-up  Today's Date: 02/20/25        Patient name: Simba Dawn Sr.  YOB: 1945  Sex: male         Chief Complaint: f/u afib/pacemaker      ASSESSMENT:  Problem List Items Addressed This Visit          Cardiovascular and Mediastinum    Persistent atrial fibrillation (HCC) - Primary    Relevant Medications    sotalol (BETAPACE) 80 mg tablet    rivaroxaban (XARELTO) 20 mg tablet    Other Relevant Orders    POCT ECG     Other Visit Diagnoses         Chronic atrial fibrillation (HCC)        Relevant Medications    sotalol (BETAPACE) 80 mg tablet    rivaroxaban (XARELTO) 20 mg tablet          79 yo  1) h/o persistent afib, in NSR since DCCV and on sotalol 80mg bid  2) Dual chamber ppm AV payal disease. Slow afib. 100% RV paced w LPFB lead  3) Dyslipidemia, LDL >300 andoes not tolerate statins we have tried multiple, not interested in further treatment. daily and   4) Atheroscloerosis/CAD 50% Mid LAD       QTc  470ms.       PLAN:  1>DOing great with no afib, cont sotalol 2. Cont  xarelt    F/u 1 year      Orders Placed This Encounter   Procedures    POCT ECG     Medications Discontinued During This Encounter   Medication Reason    sotalol (BETAPACE) 80 mg tablet Reorder    rivaroxaban (XARELTO) 20 mg tablet Reorder         .............................................................................................    HPI/Subjective:     79 yo  1) h/o persistent afib, in NSR since DCCV and on sotalol 80mg bid  2) Dual chamber ppm AV payal disease. Slow afib. 100% RV paced w LPFB lead  3) Dyslipidemia, LDL >300 andoes not tolerate statins we have tried multiple, not interested in further treatment. daily and   4) Atheroscloerosis/CAD 50% Mid LAD       Past Medical History:   Diagnosis Date    Dyslipidemia     Erectile dysfunction of non-organic origin     Gastroesophageal  reflux     Hyperlipidemia     Hypothyroidism     Liver disease     Malignant neoplasm of kidney (HCC)     Left    Malignant neoplasm of left kidney, except renal pelvis (HCC)     Pure hypercholesterolemia     Thyroid disease     Vitamin D deficiency, unspecified        Allergies   Allergen Reactions    Atorvastatin      Muscle pain    Crestor [Rosuvastatin] Dizziness    Vytorin [Ezetimibe-Simvastatin] Dizziness     I reviewed the Home Medication list and Allergies in the chart.   Scheduled Meds:  Current Outpatient Medications   Medication Sig Dispense Refill    albuterol (2.5 mg/3 mL) 0.083 % nebulizer solution Take 3 mL (2.5 mg total) by nebulization every 6 (six) hours as needed for wheezing 180 mL 8    Ascorbic Acid (vitamin C) 1000 MG tablet Take 1,000 mg by mouth daily      cholecalciferol (VITAMIN D3) 1,000 units tablet Take 1,000 Units by mouth daily      Coenzyme Q10 (CoQ10) 100 MG CAPS Take 2 capsules (200 mg total) by mouth daily 60 capsule 0    ketoconazole (NIZORAL) 2 % cream Apply topically daily (Patient taking differently: Apply topically if needed (Peeling or cracking of hands and feet)) 60 g 1    levothyroxine 75 mcg tablet TAKE 1 TABLET (75 MCG TOTAL) BY MOUTH DAILY IN THE EARLY MORNING 90 tablet 1    magnesium oxide (MAG-OX) 400 mg Take 400 mg by mouth daily      melatonin 3 mg Take by mouth daily at bedtime      Probiotic Product (CULTURELLE PROBIOTICS PO) Take by mouth      rivaroxaban (XARELTO) 20 mg tablet Take 1 tablet (20 mg total) by mouth daily after dinner 90 tablet 3    sodium chloride 3 % inhalation solution Take 4 mL by nebulization 2 (two) times a day      sotalol (BETAPACE) 80 mg tablet Take 1 tablet (80 mg total) by mouth every 12 (twelve) hours 180 tablet 3    triamcinolone (KENALOG) 0.1 % cream Apply topically 2 (two) times a day (Patient taking differently: Apply topically 2 (two) times a day as needed) 45 g 0    vitamin B-12 (VITAMIN B-12) 1,000 mcg tablet Take by mouth daily        No current facility-administered medications for this visit.     PRN Meds:.        Family History   Problem Relation Age of Onset    Cancer Mother         bladder    Heart disease Mother     Prostate cancer Father     Stroke Father     Heart disease Father     Hyperlipidemia Family        Social History     Socioeconomic History    Marital status: /Civil Union     Spouse name: Not on file    Number of children: Not on file    Years of education: Not on file    Highest education level: Not on file   Occupational History    Not on file   Tobacco Use    Smoking status: Former    Smokeless tobacco: Never    Tobacco comments:     quit at age 20   Vaping Use    Vaping status: Former   Substance and Sexual Activity    Alcohol use: Not Currently    Drug use: No    Sexual activity: Not on file   Other Topics Concern    Not on file   Social History Narrative    Not on file     Social Drivers of Health     Financial Resource Strain: Low Risk  (6/8/2023)    Overall Financial Resource Strain (CARDIA)     Difficulty of Paying Living Expenses: Not hard at all   Food Insecurity: No Food Insecurity (8/14/2024)    Nursing - Inadequate Food Risk Classification     Worried About Running Out of Food in the Last Year: Never true     Ran Out of Food in the Last Year: Never true     Ran Out of Food in the Last Year: Not on file   Transportation Needs: No Transportation Needs (8/14/2024)    PRAPARE - Transportation     Lack of Transportation (Medical): No     Lack of Transportation (Non-Medical): No   Physical Activity: Not on file   Stress: Not on file   Social Connections: Not on file   Intimate Partner Violence: Not on file   Housing Stability: Low Risk  (8/14/2024)    Housing Stability Vital Sign     Unable to Pay for Housing in the Last Year: No     Number of Times Moved in the Last Year: 1     Homeless in the Last Year: No         OBJECTIVE:    /72 (BP Location: Right arm, Patient Position: Sitting, Cuff Size:  "Standard)   Pulse 76   Ht 5' 9\" (1.753 m)   Wt 64.7 kg (142 lb 11.2 oz)   BMI 21.07 kg/m²   Vitals:    02/20/25 1108   Weight: 64.7 kg (142 lb 11.2 oz)     GEN: No acute distress, Alert and oriented, well appearing  HEENT:External ears normal, oral pharynx clear, mucous membranes moist  EYES: Pupils equal, sclera anicteric, midline, normal conjuctiva  NECK: No JVD, supple, no obvious masses or thryomegaly or goiter  CARDIOVASCULAR:  RRR, No murmur, rub, gallops S1,S2  LUNGS: Clear To auscultation bilaterally, normal effort, no rales, rhonchi, crackles   ABDOMEN:  nondistended,  without obvious organomegaly or ascites  EXTREMITIES/VASCULAR:  No edema. warm an well perfused.  PSYCH: Normal Affect,  linear speech pattern without evidence of psychosis.   NEURO: Grossly intact, moving all extremiteis equal, face symmetric, alert and responsive, no obvious focal defecits   GAIT: Ambulates normally without difficulty  HEME: No bleeding, bruising, petechia, purpura   SKIN: No significant rashes on visibile skin, warm, no diaphoresis or pallor.     Lab Results:       LABS:      Chemistry        Component Value Date/Time    K 4.5 06/10/2024 0716     06/10/2024 0716    CO2 31 06/10/2024 0716    BUN 24 06/10/2024 0716    CREATININE 0.84 06/10/2024 0716        Component Value Date/Time    CALCIUM 9.6 06/10/2024 0716    ALKPHOS 70 06/10/2024 0716    AST 23 06/10/2024 0716    ALT 17 06/10/2024 0716            No results found for: \"CHOL\"  Lab Results   Component Value Date    HDL 50 06/10/2024    HDL 48 06/09/2023    HDL 44 12/12/2022     Lab Results   Component Value Date    LDLCALC 181 (H) 06/10/2024    LDLCALC 182 (H) 06/09/2023    LDLCALC 178 (H) 12/12/2022     Lab Results   Component Value Date    TRIG 87 06/10/2024    TRIG 89 06/09/2023    TRIG 102 12/12/2022     No results found for: \"CHOLHDL\"    IMAGING: No results found.     Cardiac testing:   Results for orders placed during the hospital encounter of " 21    Echo complete with contrast if indicated    [facility]  [address] [place], [place] [address] [phone]    Transthoracic Echocardiogram  2D, M-mode, Doppler, and Color Doppler    Study date:  24-Mar-2021    Patient: KAYDEN PIPER  MR number: [id number]  Account number: [id number]  : 1945  Age: 76 years  Gender: Male  Status: Outpatient  Location: [facility] [place]  Height: 69 in  Weight: 149.6 lb  BP: 122/ 72 mmHg    Indications: Atrial fib SOB    Diagnoses: I48.0 - Atrial fibrillation    Sonographer:  RACH Kim  Referring Physician:  Arias Montaño MD  Group:  [facility]  Interpreting Physician:  Lisbeth Hines DO    SUMMARY    PROCEDURE INFORMATION:  This was a technically difficult study.    LEFT VENTRICLE:  Systolic function was normal. Ejection fraction was estimated to be 55 %.  Although no diagnostic regional wall motion abnormality was identified, this possibility cannot be completely excluded on the basis of this study.    RIGHT VENTRICLE:  The ventricle was mildly to moderately dilated.  Systolic function was mildly reduced.  Wall thickness was increased.    LEFT ATRIUM:  The atrium was dilated.    RIGHT ATRIUM:  The atrium was dilated.    MITRAL VALVE:  There was moderate regurgitation.  The regurgitant jet was centrally directed.    AORTIC VALVE:  There was mild regurgitation.    TRICUSPID VALVE:  There was moderate regurgitation.  Estimated peak PA pressure was 41 mmHg.  The findings suggest mild pulmonary hypertension.    PULMONIC VALVE:  There was mild regurgitation.    IVC, HEPATIC VEINS:  The inferior vena cava was mildly dilated.  Respirophasic changes were blunted (less than 50% variation).    HISTORY: PRIOR HISTORY: HLD, hypothyroid    PROCEDURE: The study was performed in the [facility] [place]. This was a routine study. The transthoracic approach was used. The study included  complete 2D imaging, M-mode, complete spectral Doppler, and color Doppler. The  heart rate was 71 bpm, at the start of the study. Images were obtained from the parasternal, apical, subcostal, and suprasternal notch acoustic windows. Echocardiographic views were limited due to poor acoustic window availability. This  was a technically difficult study.    LEFT VENTRICLE: Size was normal. Systolic function was normal. Ejection fraction was estimated to be 55 %. Although no diagnostic regional wall motion abnormality was identified, this possibility cannot be completely excluded on the basis  of this study. Wall thickness was normal. DOPPLER: Normal sinus rhythm was absent. The study was not technically sufficient to allow evaluation of LV diastolic function.    RIGHT VENTRICLE: The ventricle was mildly to moderately dilated. Systolic function was mildly reduced. Wall thickness was increased.    LEFT ATRIUM: The atrium was dilated.    RIGHT ATRIUM: The atrium was dilated.    MITRAL VALVE: Valve structure was normal. There was normal leaflet separation. DOPPLER: The transmitral velocity was within the normal range. There was no evidence for stenosis. There was moderate regurgitation. The regurgitant jet was  centrally directed.    AORTIC VALVE: The valve was trileaflet. Leaflets exhibited normal cuspal separation and sclerosis. DOPPLER: Transaortic velocity was within the normal range. There was no evidence for stenosis. There was mild regurgitation.    TRICUSPID VALVE: The valve structure was normal. There was normal leaflet separation. DOPPLER: The transtricuspid velocity was within the normal range. There was no evidence for stenosis. There was moderate regurgitation. Pulmonary artery  systolic pressure was mildly increased. Estimated peak PA pressure was 41 mmHg. The findings suggest mild pulmonary hypertension.    PULMONIC VALVE: Leaflets exhibited normal thickness, no calcification, and normal cuspal separation.  DOPPLER: The transpulmonic velocity was within the normal range. There was mild regurgitation.    PERICARDIUM: There was no pericardial effusion. The pericardium was normal in appearance.    AORTA: The root exhibited normal size.    SYSTEMIC VEINS: IVC: The inferior vena cava was mildly dilated. Respirophasic changes were blunted (less than 50% variation).    SYSTEM MEASUREMENT TABLES    2D  %FS: 32.74 %  Ao Diam: 3.49 cm  Ao asc: 3.54 cm  EDV(Teich): 101.42 ml  EF(Teich): 61.17 %  ESV(Teich): 39.38 ml  IVSd: 0.71 cm  LA Diam: 4.63 cm  LVIDd: 4.68 cm  LVIDs: 3.15 cm  LVPWd: 0.73 cm  SV(Teich): 62.04 ml    CW  AV MaxP.84 mmHg  AV Vmax: 0.68 m/s  PV Vmax: 0.75 m/s  PV maxP.25 mmHg  TR MaxP.6 mmHg  TR Vmax: 2.53 m/s    MM  TAPSE: 1.56 cm    PW  E' Sept: 0.1 m/s  E/E' Sept: 6.96  LVOT Vmax: 0.48 m/s  LVOT maxP.93 mmHg  MV Dec [place]: 5.16 m/s2  MV DecT: 135.86 ms  MV E [place]: 0.69 m/s  MV PHT: 39.4 ms  MVA By PHT: 5.58 cm2  RVOT Vmax: 0.5 m/s  RVOT maxP mmHg    [facility]    Prepared and electronically signed by    Lisbeth Hines DO  Signed 24-Mar-2021 09:01:07    Results for orders placed during the hospital encounter of 21    VENKATESH    Narrative  [address] [address] [phone]    Transesophageal Echocardiogram  2D, Doppler, and Color Doppler    Study date:  2021    Patient: KAYDEN PIPER  MR number: [id number]  Account number: [id number]  : 1945  Age: 76 years  Gender: Male  Status: Inpatient  Location: Cath lab  Height: 69 in  Weight: 148 lb  BP: 85395/ 72 mmHg    Indications: A-Fib    Diagnoses: I48.0 - Atrial fibrillation    Sonographer:  RACH Gonzalez  Interpreting Physician:  Ganga Newman MD  Primary Physician:  Arias Montaño MD  Referring Physician:  Ganga Newman MD  [facility]    SUMMARY    LEFT  VENTRICLE:  Systolic function was normal. Ejection fraction was estimated to be 55 %.    LEFT ATRIUM:  The atrium was mildly dilated.    LEFT ATRIAL APPENDAGE:  The size was normal.  The function was mildly reduced (mildly reduced emptying velocity).  No thrombus was identified.    ATRIAL SEPTUM:  No defect or patent foramen ovale was identified.    MITRAL VALVE:  There was mild regurgitation.    AORTIC VALVE:  There was trace regurgitation.    TRICUSPID VALVE:  There was mild regurgitation.    HISTORY: PRIOR HISTORY: HLD and Hypothyroidism    PROCEDURE: The procedure was performed in the catheterization laboratory. This was a routine study. The risks and alternatives of the procedure were explained to the patient and informed consent was obtained. The transesophageal approach  was used. The study included complete 2D imaging, limited spectral Doppler, and color Doppler. The heart rate was 55 bpm, at the start of the study. An adult omniplane probe was inserted by the attending cardiologist. Intubated with ease.  One intubation attempt(s). There was no blood detected on the probe. Image quality was good. There were no complications during the procedure. MEDICATIONS: Anesthesia administered by anesthesia team.    LEFT VENTRICLE: Size was normal. Systolic function was normal. Ejection fraction was estimated to be 55 %.    RIGHT VENTRICLE: The size was normal. Systolic function was normal. Wall thickness was normal.    LEFT ATRIUM: The atrium was mildly dilated. No thrombus was identified. APPENDAGE: The size was normal. No thrombus was identified. DOPPLER: The function was mildly reduced (mildly reduced emptying velocity).    ATRIAL SEPTUM: No defect or patent foramen ovale was identified.    RIGHT ATRIUM: Size was normal. No thrombus was identified.    MITRAL VALVE: Valve structure was normal. There was normal leaflet separation. There was no echocardiographic evidence of vegetation. DOPPLER: There was mild  regurgitation.    AORTIC VALVE: The valve was trileaflet. Leaflets exhibited normal thickness and normal cuspal separation. There was no echocardiographic evidence of vegetation. DOPPLER: There was trace regurgitation.    TRICUSPID VALVE: The valve structure was normal. There was normal leaflet separation. There was no echocardiographic evidence of vegetation. DOPPLER: There was mild regurgitation.    PERICARDIUM: There was no pericardial effusion. The pericardium was normal in appearance.    [facility]    Prepared and electronically signed by    Ganga Newman MD  Signed 29-Jun-2021 14:24:23    No results found for this or any previous visit.    No results found for this or any previous visit.          I reviewed and interpreted the following LABS/EKG/TELE/IMAGING and below is summary of my interpretation (if data available):        Current EKG and Rhythm Strip: AV pacing w narrow QRS.  Qtc 470ms    PPM check no afib.

## 2025-02-25 ENCOUNTER — APPOINTMENT (OUTPATIENT)
Dept: LAB | Facility: AMBULARY SURGERY CENTER | Age: 80
End: 2025-02-25
Payer: MEDICARE

## 2025-02-25 DIAGNOSIS — Z95.0 PACEMAKER: ICD-10-CM

## 2025-02-25 DIAGNOSIS — C64.2 RENAL CELL ADENOCARCINOMA, LEFT (HCC): ICD-10-CM

## 2025-02-25 DIAGNOSIS — L85.3 DRY SKIN DERMATITIS: ICD-10-CM

## 2025-02-25 DIAGNOSIS — Z79.899 OTHER LONG TERM (CURRENT) DRUG THERAPY: ICD-10-CM

## 2025-02-25 DIAGNOSIS — I50.32 CHRONIC DIASTOLIC (CONGESTIVE) HEART FAILURE (HCC): ICD-10-CM

## 2025-02-25 DIAGNOSIS — E55.9 VITAMIN D DEFICIENCY: ICD-10-CM

## 2025-02-25 DIAGNOSIS — E53.8 B12 DEFICIENCY: ICD-10-CM

## 2025-02-25 DIAGNOSIS — E03.9 ACQUIRED HYPOTHYROIDISM: ICD-10-CM

## 2025-02-25 DIAGNOSIS — I48.19 PERSISTENT ATRIAL FIBRILLATION (HCC): ICD-10-CM

## 2025-02-25 LAB
25(OH)D3 SERPL-MCNC: 99.5 NG/ML (ref 30–100)
ALBUMIN SERPL BCG-MCNC: 4.1 G/DL (ref 3.5–5)
ALP SERPL-CCNC: 86 U/L (ref 34–104)
ALT SERPL W P-5'-P-CCNC: 28 U/L (ref 7–52)
ANION GAP SERPL CALCULATED.3IONS-SCNC: 9 MMOL/L (ref 4–13)
AST SERPL W P-5'-P-CCNC: 26 U/L (ref 13–39)
BASOPHILS # BLD AUTO: 0.02 THOUSANDS/ÂΜL (ref 0–0.1)
BASOPHILS NFR BLD AUTO: 0 % (ref 0–1)
BILIRUB SERPL-MCNC: 0.46 MG/DL (ref 0.2–1)
BUN SERPL-MCNC: 20 MG/DL (ref 5–25)
CALCIUM SERPL-MCNC: 9.9 MG/DL (ref 8.4–10.2)
CHLORIDE SERPL-SCNC: 97 MMOL/L (ref 96–108)
CHOLEST SERPL-MCNC: 215 MG/DL (ref ?–200)
CO2 SERPL-SCNC: 33 MMOL/L (ref 21–32)
CREAT SERPL-MCNC: 0.92 MG/DL (ref 0.6–1.3)
EOSINOPHIL # BLD AUTO: 0.1 THOUSAND/ÂΜL (ref 0–0.61)
EOSINOPHIL NFR BLD AUTO: 2 % (ref 0–6)
ERYTHROCYTE [DISTWIDTH] IN BLOOD BY AUTOMATED COUNT: 12.5 % (ref 11.6–15.1)
EST. AVERAGE GLUCOSE BLD GHB EST-MCNC: 137 MG/DL
GFR SERPL CREATININE-BSD FRML MDRD: 77 ML/MIN/1.73SQ M
GLUCOSE P FAST SERPL-MCNC: 94 MG/DL (ref 65–99)
HBA1C MFR BLD: 6.4 %
HCT VFR BLD AUTO: 38.3 % (ref 36.5–49.3)
HDLC SERPL-MCNC: 49 MG/DL
HGB BLD-MCNC: 12.6 G/DL (ref 12–17)
IMM GRANULOCYTES # BLD AUTO: 0.01 THOUSAND/UL (ref 0–0.2)
IMM GRANULOCYTES NFR BLD AUTO: 0 % (ref 0–2)
LDLC SERPL CALC-MCNC: 149 MG/DL (ref 0–100)
LYMPHOCYTES # BLD AUTO: 1.21 THOUSANDS/ÂΜL (ref 0.6–4.47)
LYMPHOCYTES NFR BLD AUTO: 24 % (ref 14–44)
MCH RBC QN AUTO: 33.8 PG (ref 26.8–34.3)
MCHC RBC AUTO-ENTMCNC: 32.9 G/DL (ref 31.4–37.4)
MCV RBC AUTO: 103 FL (ref 82–98)
MONOCYTES # BLD AUTO: 0.62 THOUSAND/ÂΜL (ref 0.17–1.22)
MONOCYTES NFR BLD AUTO: 12 % (ref 4–12)
NEUTROPHILS # BLD AUTO: 3.13 THOUSANDS/ÂΜL (ref 1.85–7.62)
NEUTS SEG NFR BLD AUTO: 62 % (ref 43–75)
NRBC BLD AUTO-RTO: 0 /100 WBCS
PLATELET # BLD AUTO: 263 THOUSANDS/UL (ref 149–390)
PMV BLD AUTO: 9 FL (ref 8.9–12.7)
POTASSIUM SERPL-SCNC: 4.1 MMOL/L (ref 3.5–5.3)
PROT SERPL-MCNC: 8 G/DL (ref 6.4–8.4)
RBC # BLD AUTO: 3.73 MILLION/UL (ref 3.88–5.62)
SODIUM SERPL-SCNC: 139 MMOL/L (ref 135–147)
TRIGL SERPL-MCNC: 84 MG/DL (ref ?–150)
TSH SERPL DL<=0.05 MIU/L-ACNC: 0.86 UIU/ML (ref 0.45–4.5)
VIT B12 SERPL-MCNC: 1036 PG/ML (ref 180–914)
WBC # BLD AUTO: 5.09 THOUSAND/UL (ref 4.31–10.16)

## 2025-02-25 PROCEDURE — 85025 COMPLETE CBC W/AUTO DIFF WBC: CPT

## 2025-02-25 PROCEDURE — 80061 LIPID PANEL: CPT

## 2025-02-25 PROCEDURE — 36415 COLL VENOUS BLD VENIPUNCTURE: CPT

## 2025-02-25 PROCEDURE — 82306 VITAMIN D 25 HYDROXY: CPT

## 2025-02-25 PROCEDURE — 82607 VITAMIN B-12: CPT

## 2025-02-25 PROCEDURE — 83036 HEMOGLOBIN GLYCOSYLATED A1C: CPT

## 2025-02-25 PROCEDURE — 80053 COMPREHEN METABOLIC PANEL: CPT

## 2025-02-25 PROCEDURE — 84443 ASSAY THYROID STIM HORMONE: CPT

## 2025-02-26 ENCOUNTER — RESULTS FOLLOW-UP (OUTPATIENT)
Dept: FAMILY MEDICINE CLINIC | Facility: CLINIC | Age: 80
End: 2025-02-26

## 2025-03-27 DIAGNOSIS — E03.9 ACQUIRED HYPOTHYROIDISM: ICD-10-CM

## 2025-03-27 RX ORDER — LEVOTHYROXINE SODIUM 75 UG/1
75 TABLET ORAL
Qty: 90 TABLET | Refills: 1 | Status: SHIPPED | OUTPATIENT
Start: 2025-03-27

## 2025-03-27 NOTE — TELEPHONE ENCOUNTER
Reason for call:   [x] Refill   [] Prior Auth  [] Other:     Office:   [x] PCP/Provider -   [] Specialty/Provider -     Medication: levothyroxine 75 mcg, take 1 tablet by mouth daily       Pharmacy: [facility]     Local Pharmacy   Does the patient have enough for 3 days?   [x] Yes   [] No - Send as HP to POD    Mail Away Pharmacy   Does the patient have enough for 10 days?   [] Yes   [] No - Send as HP to POD

## 2025-03-30 ENCOUNTER — APPOINTMENT (OUTPATIENT)
Dept: LAB | Facility: CLINIC | Age: 80
End: 2025-03-30
Payer: MEDICARE

## 2025-03-30 DIAGNOSIS — J21.9 BRONCHIOLITIS: ICD-10-CM

## 2025-03-30 DIAGNOSIS — R05.3 PERSISTENT COUGH: ICD-10-CM

## 2025-03-30 PROCEDURE — 87070 CULTURE OTHR SPECIMN AEROBIC: CPT

## 2025-03-30 PROCEDURE — 87116 MYCOBACTERIA CULTURE: CPT

## 2025-03-30 PROCEDURE — 87206 SMEAR FLUORESCENT/ACID STAI: CPT

## 2025-03-30 PROCEDURE — 87205 SMEAR GRAM STAIN: CPT

## 2025-03-31 LAB — RHODAMINE-AURAMINE STN SPEC: NORMAL

## 2025-04-01 LAB
BACTERIA SPT RESP CULT: ABNORMAL
GRAM STN SPEC: ABNORMAL

## 2025-04-08 LAB
MYCOBACTERIUM SPEC CULT: NORMAL
RHODAMINE-AURAMINE STN SPEC: NORMAL

## 2025-04-15 LAB
MYCOBACTERIUM SPEC CULT: NORMAL
RHODAMINE-AURAMINE STN SPEC: NORMAL

## 2025-04-22 LAB — MYCOBACTERIUM SPEC CULT: NORMAL

## 2025-04-24 RX ORDER — SODIUM CHLORIDE FOR INHALATION 3 %
4 VIAL, NEBULIZER (ML) INHALATION 2 TIMES DAILY
Refills: 0 | OUTPATIENT
Start: 2025-04-24

## 2025-04-25 DIAGNOSIS — J21.9 BRONCHIOLITIS: Primary | ICD-10-CM

## 2025-04-25 RX ORDER — SODIUM CHLORIDE FOR INHALATION 3 %
4 VIAL, NEBULIZER (ML) INHALATION 2 TIMES DAILY
Qty: 240 ML | Refills: 3 | Status: SHIPPED | OUTPATIENT
Start: 2025-04-25

## 2025-04-30 LAB — MYCOBACTERIUM SPEC CULT: NORMAL

## 2025-05-06 LAB — MYCOBACTERIUM SPEC CULT: NORMAL

## 2025-05-08 ENCOUNTER — REMOTE DEVICE CLINIC VISIT (OUTPATIENT)
Dept: CARDIOLOGY CLINIC | Facility: CLINIC | Age: 80
End: 2025-05-08
Payer: MEDICARE

## 2025-05-08 ENCOUNTER — RESULTS FOLLOW-UP (OUTPATIENT)
Dept: CARDIOLOGY CLINIC | Facility: CLINIC | Age: 80
End: 2025-05-08

## 2025-05-08 DIAGNOSIS — Z95.0 CARDIAC PACEMAKER IN SITU: Primary | ICD-10-CM

## 2025-05-08 PROCEDURE — 93294 REM INTERROG EVL PM/LDLS PM: CPT | Performed by: INTERNAL MEDICINE

## 2025-05-08 PROCEDURE — 93296 REM INTERROG EVL PM/IDS: CPT | Performed by: INTERNAL MEDICINE

## 2025-05-08 NOTE — PROGRESS NOTES
Results for orders placed or performed in visit on 05/08/25   Cardiac EP device report    Narrative    MDT DUAL PM/ ACTIVE SYSTEM IS MRI CONDITIONAL  CARELINK TRANSMISSION: BATTERY VOLTAGE ADEQUATE (7.8 YRS). AP-76%, >99% (>40% DDDR@60PPM/MVP OFF). ALL AVAILABLE LEAD PARAMETERS WITHIN NORMAL LIMITS. 2 NSVT EPISODES- 5 BEATS, AVG CL~320MS & 10 BEATS, AVG CL~360MS. EF-55% (VENKATESH 6/29/21). PT ON XARELTO & SOTALOL. PVC SINGLES COUNT SINCE 2/6/25- 5/H. NORMAL DEVICE FUNCTION. GV

## 2025-05-13 LAB — MYCOBACTERIUM SPEC CULT: NORMAL

## 2025-05-14 ENCOUNTER — HOSPITAL ENCOUNTER (OUTPATIENT)
Dept: CT IMAGING | Facility: HOSPITAL | Age: 80
Discharge: HOME | End: 2025-05-14
Payer: MEDICARE

## 2025-05-14 DIAGNOSIS — J21.9 BRONCHIOLITIS: ICD-10-CM

## 2025-05-14 PROCEDURE — 71250 CT THORAX DX C-: CPT

## 2025-05-20 LAB — MYCOBACTERIUM SPEC CULT: NORMAL

## 2025-05-21 ENCOUNTER — OFFICE VISIT (OUTPATIENT)
Dept: PULMONOLOGY | Facility: CLINIC | Age: 80
End: 2025-05-21
Payer: MEDICARE

## 2025-05-21 VITALS
TEMPERATURE: 98.1 F | OXYGEN SATURATION: 98 % | WEIGHT: 139 LBS | SYSTOLIC BLOOD PRESSURE: 130 MMHG | BODY MASS INDEX: 20.53 KG/M2 | HEART RATE: 74 BPM | DIASTOLIC BLOOD PRESSURE: 80 MMHG

## 2025-05-21 DIAGNOSIS — J21.9 BRONCHIOLITIS: Primary | ICD-10-CM

## 2025-05-21 PROCEDURE — 99214 OFFICE O/P EST MOD 30 MIN: CPT | Performed by: INTERNAL MEDICINE

## 2025-05-21 PROCEDURE — G2211 COMPLEX E/M VISIT ADD ON: HCPCS | Performed by: INTERNAL MEDICINE

## 2025-05-21 NOTE — PROGRESS NOTES
"    Follow Up - Pulmonary Medicine   Simba Dawn Sr. 80 y.o. male MRN: [id number]      Simba Dawn Sr. is a 80 y.o. male who presents for follow up of chronic cough and abnormal CT.    Bronchiolitis on CT  Chronic Cough  Pt with cough for 8+ months. CT Chest with extensive tree in bud, scattered nodules and consolidation in RML and lingula. Dx includes MAC vs chronic aspiration. Pt has GERD and patulous esophagus with many years of dysphagia to solid food and some liquids. Undergoing GI workup, overall had \"risk of aspiration\" on barium swallow but with small, slow sips and small bites, no overt aspiration.   Sputum culture with normal respiratory mahi and Afb also negative in the past. With mucous clearance therapy, cough and dyspnea significantly improved, and lugns sound clear on exam. CT Chest with waxing and waning reticulonodular infiltrates, persistent tree in bud, mucoid impaction.   Eos low    Negative sputum bacterial and AFB cultures in 2023, 2024, 2025  Mild URI symptoms with cough for a week in March, negative cultures at the time    - discussed risks and benefits of bronchi and BAL to send for micro, overall cough minimal so will hold off on Bal for now  - repeat sputum yearly for bacterial cultures and AFB - last in March 2025 normal resp mahi, - AFB  - airway clearance regimen: albuterol followed by 3% saline nebulizer BID + flutter valve      I have spent a total time of 30 minutes on 05/21/25 in caring for this patient including Diagnostic results, Prognosis, Risks and benefits of tx options, Instructions for management, Patient and family education, Importance of tx compliance, Risk factor reductions, Impressions, Counseling / Coordination of care, Documenting in the medical record, Reviewing / ordering tests, medicine, procedures  , and Obtaining or reviewing history  .   ______________________________    Interval Hx:  Cough inconsistent, some days better and worse, but overall " currently not bad  No hemoptysis  AFB and sputum culture normal respiratory mahi  Using hypertonic saline and acapella BID - morning and afternoon  URI symptoms shortly after last visit  Nothing in the last month      HPI:    Simba Dawn Sr. is a 80 y.o. male who presents for evaluaiton of chornic cough and abnormal CT. PMH includes GERD, Afib, CHF, RCC (left) sp cryoablation in 2020 with no recurrence.    Pt reports chronic cough for 8 months. Got 10day course abx (ceftin and azithromycin) x2 which improved symptoms initially but then cough came back.  Bring up mucous and phlegm, yellowish gray  Does report some dysphagia for many years, worse with pills and solid food than liquid. Also with significant GERD, PPI didn't help so he stopped using  No fevers, chills. Did have night sweats a few times in January but this resolved.   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    Current Medications:    Current Outpatient Medications:     albuterol (2.5 mg/3 mL) 0.083 % nebulizer solution, Take 3 mL (2.5 mg total) by nebulization every 6 (six) hours as needed for wheezing, Disp: 180 mL, Rfl: 8    Ascorbic Acid (vitamin C) 1000 MG tablet, Take 1,000 mg by mouth in the morning., Disp: , Rfl:     cholecalciferol (VITAMIN D3) 1,000 units tablet, Take 1,000 Units by mouth in the morning., Disp: , Rfl:     Coenzyme Q10 (CoQ10) 100 MG CAPS, Take 2 capsules (200 mg total) by mouth daily, Disp: 60 capsule, Rfl: 0    levothyroxine 75 mcg tablet, Take 1 tablet (75 mcg total) by mouth daily in the early morning, Disp: 90 tablet, Rfl: 1     magnesium oxide (MAG-OX) 400 mg, Take 400 mg by mouth in the morning., Disp: , Rfl:     melatonin 3 mg, Take by mouth daily at bedtime, Disp: , Rfl:     Probiotic Product (CULTURELLE PROBIOTICS PO), Take by mouth, Disp: , Rfl:     rivaroxaban (XARELTO) 20 mg tablet, Take 1 tablet (20 mg total) by mouth daily after dinner, Disp: 90 tablet, Rfl: 3    sodium chloride 3 % inhalation solution, Take 4 mL by nebulization 2 (two) times a day, Disp: 240 mL, Rfl: 3    sotalol (BETAPACE) 80 mg tablet, Take 1 tablet (80 mg total) by mouth every 12 (twelve) hours, Disp: 180 tablet, Rfl: 3    triamcinolone (KENALOG) 0.1 % cream, Apply topically 2 (two) times a day, Disp: 45 g, Rfl: 0    vitamin B-12 (VITAMIN B-12) 1,000 mcg tablet, Take by mouth in the morning., Disp: , Rfl:     ketoconazole (NIZORAL) 2 % cream, Apply topically daily (Patient taking differently: Apply topically if needed (Peeling or cracking of hands and feet)), Disp: 60 g, Rfl: 1    Historical Information   Past Medical History:   Diagnosis Date    Dyslipidemia     Erectile dysfunction of non-organic origin     Gastroesophageal reflux     Hyperlipidemia     Hypothyroidism     Liver disease     Malignant neoplasm of kidney (HCC)     Left    Malignant neoplasm of left kidney, except renal pelvis (HCC)     Pure hypercholesterolemia     Thyroid disease     Vitamin D deficiency, unspecified      Past Surgical History:   Procedure Laterality Date    CARDIAC PACEMAKER PLACEMENT      CATARACT EXTRACTION Bilateral 2014    CYSTOSCOPY      EGD AND COLONOSCOPY  12/15/2009    HERNIA REPAIR Left     Inguinal    URETHRA SURGERY  05/2010    Done by Dr. Mohan      Social History   Social History     Tobacco Use   Smoking Status Former   Smokeless Tobacco Never   Tobacco Comments    quit at age 20       Family History:   Family History   Problem Relation Age of Onset    Cancer Mother         bladder    Heart disease Mother     Prostate cancer Father     Stroke Father     Heart  "disease Father     Hyperlipidemia Family          PhysicalExamination:  Vitals:   /80 (BP Location: Left arm, Patient Position: Sitting, Cuff Size: Standard)   Pulse 74   Temp 98.1 °F (36.7 °C) (Temporal)   Wt 63 kg (139 lb)   SpO2 98%   BMI 20.53 kg/m²     Appearance -- thin elderly man, NAD  HEENT -- anicteric sclera, clear OP, MMM  Neck -- no JVD  Heart -- RRR, no murmurs  Lungs -- clear lungs  Abdomen -- soft, NTND, +bs  Extremities -- WWP, no LE edema  Skin -- no rash  Neuro -- A&Ox3, wnl  Psych -- no obvious depression or hallucination        Diagnostic Data:  Labs:  I personally reviewed the most recent laboratory data pertinent to today's visit    Lab Results   Component Value Date    WBC 5.09 02/25/2025    HGB 12.6 02/25/2025    HCT 38.3 02/25/2025     (H) 02/25/2025     02/25/2025     Lab Results   Component Value Date    CALCIUM 9.9 02/25/2025    K 4.1 02/25/2025    CO2 33 (H) 02/25/2025    CL 97 02/25/2025    BUN 20 02/25/2025    CREATININE 0.92 02/25/2025     No results found for: \"IGE\"  Lab Results   Component Value Date    ALT 28 02/25/2025    AST 26 02/25/2025    ALKPHOS 86 02/25/2025       PFT results:  The most recent pulmonary function tests were reviewed.  NONE    Imaging:  I personally reviewed the images on the PAC system pertinent to today's visit  CT Chest 8/2023:  Extensive bilateral tree-in-bud nodularity and confluent nodules involving all lobes of both lungs with consolidation in the right middle lobe and inferior lingula, present since June 2023, new since June 2021. This could be atypical mycobacterial infection. Recommend pulmonary referral.  Also with patulous esophagus    CT Chest 6.2024:   Previous right apical paramediastinal consolidation has improved. A mild lingular basilar paramediastinal consolidation has worsened.  2.  Otherwise, findings of tree-in-bud nodularity, mucoid impaction, and multifocal consolidation persist, again favoring atypical " mycobacterial infection, with chronic aspiration considered less likely.    Ct Chest 5/2025: Interval mild progression in tubular opacities involving the right lung and left lower lobe and grossly stable tubular opacities involving the lingula. Findings are suggestive of chronic mucoid impaction and multifocal consolidation of atypical mycobacterial infection.             Emmanuelle Mathews MD  [facility]

## 2025-06-03 LAB — MYCOBACTERIUM SPEC CULT: NORMAL

## 2025-06-09 DIAGNOSIS — I48.20 CHRONIC ATRIAL FIBRILLATION (HCC): ICD-10-CM

## 2025-07-28 DIAGNOSIS — L85.3 DRY SKIN DERMATITIS: ICD-10-CM

## 2025-07-29 RX ORDER — KETOCONAZOLE 20 MG/G
CREAM TOPICAL AS NEEDED
Qty: 60 G | Refills: 3 | Status: SHIPPED | OUTPATIENT
Start: 2025-07-29

## 2025-08-07 ENCOUNTER — REMOTE DEVICE CLINIC VISIT (OUTPATIENT)
Dept: CARDIOLOGY CLINIC | Facility: CLINIC | Age: 80
End: 2025-08-07
Payer: MEDICARE

## 2025-08-07 DIAGNOSIS — I48.19 PERSISTENT ATRIAL FIBRILLATION (HCC): Primary | ICD-10-CM

## 2025-08-07 DIAGNOSIS — Z95.0 PRESENCE OF CARDIAC PACEMAKER: ICD-10-CM

## 2025-08-07 PROCEDURE — 93294 REM INTERROG EVL PM/LDLS PM: CPT | Performed by: INTERNAL MEDICINE

## 2025-08-07 PROCEDURE — 93296 REM INTERROG EVL PM/IDS: CPT | Performed by: INTERNAL MEDICINE
